# Patient Record
Sex: FEMALE | Race: WHITE | Employment: OTHER | URBAN - METROPOLITAN AREA
[De-identification: names, ages, dates, MRNs, and addresses within clinical notes are randomized per-mention and may not be internally consistent; named-entity substitution may affect disease eponyms.]

---

## 2017-04-04 PROBLEM — R73.9 HYPERGLYCEMIA: Status: ACTIVE | Noted: 2017-04-04

## 2017-12-22 ENCOUNTER — HOSPITAL ENCOUNTER (INPATIENT)
Age: 82
LOS: 4 days | Discharge: HOME HEALTH | DRG: 378 | End: 2017-12-26
Admitting: HOSPITALIST
Payer: MEDICARE

## 2017-12-22 ENCOUNTER — ANESTHESIA (OUTPATIENT)
Dept: ENDOSCOPY | Age: 82
DRG: 378 | End: 2017-12-22
Payer: MEDICARE

## 2017-12-22 ENCOUNTER — APPOINTMENT (OUTPATIENT)
Dept: GENERAL RADIOLOGY | Age: 82
DRG: 378 | End: 2017-12-22
Payer: MEDICARE

## 2017-12-22 ENCOUNTER — ANESTHESIA EVENT (OUTPATIENT)
Dept: ENDOSCOPY | Age: 82
DRG: 378 | End: 2017-12-22
Payer: MEDICARE

## 2017-12-22 DIAGNOSIS — K92.2 ACUTE GI BLEEDING: Primary | ICD-10-CM
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
## 2017-12-22 PROBLEM — K92.1 MELENA: Status: ACTIVE | Noted: 2017-12-22

## 2017-12-22 PROBLEM — D62 ACUTE BLOOD LOSS ANEMIA: Status: ACTIVE | Noted: 2017-12-22

## 2017-12-22 LAB
ALBUMIN SERPL-MCNC: 2.2 G/DL (ref 3.2–4.6)
ALBUMIN/GLOB SERPL: 0.7 {RATIO} (ref 1.2–3.5)
ALP SERPL-CCNC: 76 U/L (ref 50–136)
ALT SERPL-CCNC: 27 U/L (ref 12–65)
ANION GAP SERPL CALC-SCNC: 8 MMOL/L (ref 7–16)
AST SERPL-CCNC: 31 U/L (ref 15–37)
ATRIAL RATE: 99 BPM
BASOPHILS # BLD: 0 K/UL (ref 0–0.2)
BASOPHILS NFR BLD: 0 % (ref 0–2)
BILIRUB SERPL-MCNC: 0.2 MG/DL (ref 0.2–1.1)
BUN SERPL-MCNC: 30 MG/DL (ref 8–23)
CALCIUM SERPL-MCNC: 8.4 MG/DL (ref 8.3–10.4)
CALCULATED P AXIS, ECG09: 42 DEGREES
CALCULATED R AXIS, ECG10: -9 DEGREES
CALCULATED T AXIS, ECG11: -22 DEGREES
CHLORIDE SERPL-SCNC: 106 MMOL/L (ref 98–107)
CO2 SERPL-SCNC: 27 MMOL/L (ref 21–32)
CREAT SERPL-MCNC: 0.51 MG/DL (ref 0.6–1)
DIAGNOSIS, 93000: NORMAL
DIFFERENTIAL METHOD BLD: ABNORMAL
EOSINOPHIL # BLD: 0.3 K/UL (ref 0–0.8)
EOSINOPHIL NFR BLD: 2 % (ref 0.5–7.8)
ERYTHROCYTE [DISTWIDTH] IN BLOOD BY AUTOMATED COUNT: 13 % (ref 11.9–14.6)
GLOBULIN SER CALC-MCNC: 3 G/DL (ref 2.3–3.5)
GLUCOSE SERPL-MCNC: 208 MG/DL (ref 65–100)
HCT VFR BLD AUTO: 22.9 % (ref 35.8–46.3)
HCT VFR BLD AUTO: 26.3 % (ref 35.8–46.3)
HGB BLD-MCNC: 7.5 G/DL (ref 11.7–15.4)
HGB BLD-MCNC: 8.5 G/DL (ref 11.7–15.4)
IMM GRANULOCYTES # BLD: 0.1 K/UL (ref 0–0.5)
IMM GRANULOCYTES NFR BLD AUTO: 0 % (ref 0–5)
LIPASE SERPL-CCNC: 201 U/L (ref 73–393)
LYMPHOCYTES # BLD: 3.2 K/UL (ref 0.5–4.6)
LYMPHOCYTES NFR BLD: 20 % (ref 13–44)
MCH RBC QN AUTO: 31.4 PG (ref 26.1–32.9)
MCHC RBC AUTO-ENTMCNC: 32.8 G/DL (ref 31.4–35)
MCV RBC AUTO: 95.8 FL (ref 79.6–97.8)
MONOCYTES # BLD: 1.1 K/UL (ref 0.1–1.3)
MONOCYTES NFR BLD: 7 % (ref 4–12)
NEUTS SEG # BLD: 11 K/UL (ref 1.7–8.2)
NEUTS SEG NFR BLD: 71 % (ref 43–78)
P-R INTERVAL, ECG05: 144 MS
PLATELET # BLD AUTO: 321 K/UL (ref 150–450)
PMV BLD AUTO: 10.1 FL (ref 10.8–14.1)
POTASSIUM SERPL-SCNC: 3.9 MMOL/L (ref 3.5–5.1)
PROT SERPL-MCNC: 5.2 G/DL (ref 6.3–8.2)
Q-T INTERVAL, ECG07: 354 MS
QRS DURATION, ECG06: 76 MS
QTC CALCULATION (BEZET), ECG08: 454 MS
RBC # BLD AUTO: 2.39 M/UL (ref 4.05–5.25)
SODIUM SERPL-SCNC: 141 MMOL/L (ref 136–145)
VENTRICULAR RATE, ECG03: 99 BPM
WBC # BLD AUTO: 15.5 K/UL (ref 4.3–11.1)

## 2017-12-22 PROCEDURE — 74011250637 HC RX REV CODE- 250/637: Performed by: HOSPITALIST

## 2017-12-22 PROCEDURE — 76040000025: Performed by: INTERNAL MEDICINE

## 2017-12-22 PROCEDURE — 77030013131 HC IV BLD ST ICUM -A

## 2017-12-22 PROCEDURE — 86900 BLOOD TYPING SEROLOGIC ABO: CPT

## 2017-12-22 PROCEDURE — 74011000250 HC RX REV CODE- 250: Performed by: HOSPITALIST

## 2017-12-22 PROCEDURE — 77030032490 HC SLV COMPR SCD KNE COVD -B

## 2017-12-22 PROCEDURE — 80053 COMPREHEN METABOLIC PANEL: CPT

## 2017-12-22 PROCEDURE — 74011000250 HC RX REV CODE- 250: Performed by: INTERNAL MEDICINE

## 2017-12-22 PROCEDURE — 93005 ELECTROCARDIOGRAM TRACING: CPT

## 2017-12-22 PROCEDURE — 86920 COMPATIBILITY TEST SPIN: CPT

## 2017-12-22 PROCEDURE — 86923 COMPATIBILITY TEST ELECTRIC: CPT

## 2017-12-22 PROCEDURE — 87045 FECES CULTURE AEROBIC BACT: CPT

## 2017-12-22 PROCEDURE — C9113 INJ PANTOPRAZOLE SODIUM, VIA: HCPCS | Performed by: HOSPITALIST

## 2017-12-22 PROCEDURE — 71010 XR CHEST PORT: CPT

## 2017-12-22 PROCEDURE — C9113 INJ PANTOPRAZOLE SODIUM, VIA: HCPCS | Performed by: INTERNAL MEDICINE

## 2017-12-22 PROCEDURE — 83690 ASSAY OF LIPASE: CPT

## 2017-12-22 PROCEDURE — P9016 RBC LEUKOCYTES REDUCED: HCPCS

## 2017-12-22 PROCEDURE — 76060000031 HC ANESTHESIA FIRST 0.5 HR: Performed by: INTERNAL MEDICINE

## 2017-12-22 PROCEDURE — 36415 COLL VENOUS BLD VENIPUNCTURE: CPT | Performed by: INTERNAL MEDICINE

## 2017-12-22 PROCEDURE — 94760 N-INVAS EAR/PLS OXIMETRY 1: CPT

## 2017-12-22 PROCEDURE — 74011250636 HC RX REV CODE- 250/636: Performed by: ANESTHESIOLOGY

## 2017-12-22 PROCEDURE — 74011250636 HC RX REV CODE- 250/636

## 2017-12-22 PROCEDURE — 85018 HEMOGLOBIN: CPT | Performed by: INTERNAL MEDICINE

## 2017-12-22 PROCEDURE — 74011250636 HC RX REV CODE- 250/636: Performed by: INTERNAL MEDICINE

## 2017-12-22 PROCEDURE — 74011250636 HC RX REV CODE- 250/636: Performed by: HOSPITALIST

## 2017-12-22 PROCEDURE — 85025 COMPLETE CBC W/AUTO DIFF WBC: CPT

## 2017-12-22 PROCEDURE — 99285 EMERGENCY DEPT VISIT HI MDM: CPT

## 2017-12-22 PROCEDURE — 65270000029 HC RM PRIVATE

## 2017-12-22 PROCEDURE — 36430 TRANSFUSION BLD/BLD COMPNT: CPT | Performed by: NURSE PRACTITIONER

## 2017-12-22 PROCEDURE — 77010033678 HC OXYGEN DAILY

## 2017-12-22 PROCEDURE — 0DC68ZZ EXTIRPATION OF MATTER FROM STOMACH, VIA NATURAL OR ARTIFICIAL OPENING ENDOSCOPIC: ICD-10-PCS | Performed by: INTERNAL MEDICINE

## 2017-12-22 PROCEDURE — 30233N1 TRANSFUSION OF NONAUTOLOGOUS RED BLOOD CELLS INTO PERIPHERAL VEIN, PERCUTANEOUS APPROACH: ICD-10-PCS

## 2017-12-22 RX ORDER — ACETAMINOPHEN 325 MG/1
650 TABLET ORAL
Status: DISCONTINUED | OUTPATIENT
Start: 2017-12-22 | End: 2017-12-26 | Stop reason: HOSPADM

## 2017-12-22 RX ORDER — SODIUM CHLORIDE 0.9 % (FLUSH) 0.9 %
5-10 SYRINGE (ML) INJECTION AS NEEDED
Status: DISCONTINUED | OUTPATIENT
Start: 2017-12-22 | End: 2017-12-26 | Stop reason: HOSPADM

## 2017-12-22 RX ORDER — SODIUM CHLORIDE, SODIUM LACTATE, POTASSIUM CHLORIDE, CALCIUM CHLORIDE 600; 310; 30; 20 MG/100ML; MG/100ML; MG/100ML; MG/100ML
100 INJECTION, SOLUTION INTRAVENOUS CONTINUOUS
Status: DISCONTINUED | OUTPATIENT
Start: 2017-12-22 | End: 2017-12-23

## 2017-12-22 RX ORDER — PROPOFOL 10 MG/ML
INJECTION, EMULSION INTRAVENOUS AS NEEDED
Status: DISCONTINUED | OUTPATIENT
Start: 2017-12-22 | End: 2017-12-22 | Stop reason: HOSPADM

## 2017-12-22 RX ORDER — PAROXETINE HYDROCHLORIDE 20 MG/1
20 TABLET, FILM COATED ORAL DAILY
Status: DISCONTINUED | OUTPATIENT
Start: 2017-12-22 | End: 2017-12-26 | Stop reason: HOSPADM

## 2017-12-22 RX ORDER — ANASTROZOLE 1 MG/1
1 TABLET ORAL DAILY
Status: DISCONTINUED | OUTPATIENT
Start: 2017-12-22 | End: 2017-12-26 | Stop reason: HOSPADM

## 2017-12-22 RX ORDER — ONDANSETRON 2 MG/ML
4 INJECTION INTRAMUSCULAR; INTRAVENOUS ONCE
Status: COMPLETED | OUTPATIENT
Start: 2017-12-22 | End: 2017-12-22

## 2017-12-22 RX ORDER — ONDANSETRON 2 MG/ML
INJECTION INTRAMUSCULAR; INTRAVENOUS
Status: ACTIVE
Start: 2017-12-22 | End: 2017-12-22

## 2017-12-22 RX ORDER — SODIUM CHLORIDE 0.9 % (FLUSH) 0.9 %
5-10 SYRINGE (ML) INJECTION EVERY 8 HOURS
Status: DISCONTINUED | OUTPATIENT
Start: 2017-12-22 | End: 2017-12-26 | Stop reason: HOSPADM

## 2017-12-22 RX ORDER — SODIUM CHLORIDE 9 MG/ML
250 INJECTION, SOLUTION INTRAVENOUS AS NEEDED
Status: DISCONTINUED | OUTPATIENT
Start: 2017-12-22 | End: 2017-12-22

## 2017-12-22 RX ORDER — PROPOFOL 10 MG/ML
INJECTION, EMULSION INTRAVENOUS
Status: DISCONTINUED | OUTPATIENT
Start: 2017-12-22 | End: 2017-12-22 | Stop reason: HOSPADM

## 2017-12-22 RX ORDER — AMLODIPINE BESYLATE 5 MG/1
2.5 TABLET ORAL DAILY
Status: DISCONTINUED | OUTPATIENT
Start: 2017-12-22 | End: 2017-12-26 | Stop reason: HOSPADM

## 2017-12-22 RX ORDER — ONDANSETRON 2 MG/ML
4 INJECTION INTRAMUSCULAR; INTRAVENOUS
Status: DISCONTINUED | OUTPATIENT
Start: 2017-12-22 | End: 2017-12-26 | Stop reason: HOSPADM

## 2017-12-22 RX ORDER — ATORVASTATIN CALCIUM 40 MG/1
40 TABLET, FILM COATED ORAL DAILY
Status: DISCONTINUED | OUTPATIENT
Start: 2017-12-22 | End: 2017-12-26 | Stop reason: HOSPADM

## 2017-12-22 RX ORDER — FAMOTIDINE 20 MG/1
20 TABLET, FILM COATED ORAL AS NEEDED
Status: DISCONTINUED | OUTPATIENT
Start: 2017-12-22 | End: 2017-12-26 | Stop reason: HOSPADM

## 2017-12-22 RX ADMIN — Medication 10 ML: at 17:18

## 2017-12-22 RX ADMIN — SODIUM CHLORIDE, SODIUM LACTATE, POTASSIUM CHLORIDE, AND CALCIUM CHLORIDE: 600; 310; 30; 20 INJECTION, SOLUTION INTRAVENOUS at 15:22

## 2017-12-22 RX ADMIN — SODIUM CHLORIDE, SODIUM LACTATE, POTASSIUM CHLORIDE, AND CALCIUM CHLORIDE 100 ML/HR: 600; 310; 30; 20 INJECTION, SOLUTION INTRAVENOUS at 13:56

## 2017-12-22 RX ADMIN — AMLODIPINE BESYLATE 2.5 MG: 5 TABLET ORAL at 14:56

## 2017-12-22 RX ADMIN — SODIUM CHLORIDE 40 MG: 9 INJECTION INTRAMUSCULAR; INTRAVENOUS; SUBCUTANEOUS at 10:27

## 2017-12-22 RX ADMIN — OCTREOTIDE ACETATE 50 MCG/HR: 500 INJECTION, SOLUTION INTRAVENOUS; SUBCUTANEOUS at 18:22

## 2017-12-22 RX ADMIN — PAROXETINE HYDROCHLORIDE 20 MG: 20 TABLET, FILM COATED ORAL at 10:26

## 2017-12-22 RX ADMIN — CHLORASEPTIC 1 SPRAY: 1.5 LIQUID ORAL at 17:17

## 2017-12-22 RX ADMIN — SODIUM CHLORIDE 80 MG: 9 INJECTION, SOLUTION INTRAMUSCULAR; INTRAVENOUS; SUBCUTANEOUS at 06:06

## 2017-12-22 RX ADMIN — ONDANSETRON 4 MG: 2 INJECTION INTRAMUSCULAR; INTRAVENOUS at 06:06

## 2017-12-22 RX ADMIN — PROPOFOL 60 MG: 10 INJECTION, EMULSION INTRAVENOUS at 15:26

## 2017-12-22 RX ADMIN — ANASTROZOLE 1 MG: 1 TABLET, COATED ORAL at 10:24

## 2017-12-22 RX ADMIN — Medication 10 ML: at 21:08

## 2017-12-22 RX ADMIN — ATORVASTATIN CALCIUM 40 MG: 40 TABLET, FILM COATED ORAL at 10:26

## 2017-12-22 RX ADMIN — SODIUM CHLORIDE 40 MG: 9 INJECTION INTRAMUSCULAR; INTRAVENOUS; SUBCUTANEOUS at 17:17

## 2017-12-22 RX ADMIN — PROPOFOL 140 MCG/KG/MIN: 10 INJECTION, EMULSION INTRAVENOUS at 15:27

## 2017-12-22 NOTE — CONSULTS
[facility] Consult Note    Dylan Farah [place] 1935       Primary GI Physician: new    Referring Physician:  Dr Naveen Whitlock    Consult Date:  12/22/2017    Admit Date:  12/22/2017    Chief Complaint:  Melena    Subjective:     History of Present Illness:  Patient is a 80 y.o. female seen in consultation at the request of Dr. Naveen Whitlock for evaluation of anemia and melena. She was admitted in the night after having been found in a pool of black tarry stool. WBC was elevated at 15.5, and hgb was noted at 7.5. BUN was also elevated at 50. She was placed on Protonix IV bid and is to be transfused one unit PRBC. She reports some lightheadedness and clamminess for the last week along with some abdominal bloating in the last month. She is on daily aspirin at home but denies use of nsaids. HX of breast cancer is noted, S/P lumpectomy, but no radiation. She has had no prior EGD and cannot recall last colonoscopy. There are no records listed in the [facility] office for her. Portable chest xray  12/22/17    COMPARISON: January 17, 2014  INDICATION: GI bleed. Fall    FINDINGS:     Lungs are underinflated. Mild bibasilar densities, likely atelectasis. No focal  consolidation, pneumothorax or pulmonary edema. No large pleural effusion. Cardiac mediastinal contour is within normal limits.     There are degenerative changes at the right glenohumeral joint. A 12 mm  calcification inferior to the shoulder joint, likely an intra-articular loose  body. IMPRESSION:    No evidence of acute pulmonary disease. Lorrie Valladares       PMH:  Past Medical History:   Diagnosis Date    Acute bronchitis     Anxiety state, unspecified     Cancer ([address] 12/13    left breast    Chest pain, unspecified     Depression     Depressive disorder, not elsewhere classified 5/13/2015    Disorder of bone and cartilage, unspecified 5/13/2015    Enthesopathy of unspecified site 5/13/2015    Essential hypertension, benign 5/13/2015    GERD (gastroesophageal reflux disease)     no longer has    Hip, thigh, leg, and ankle, blister, without mention of infection     Hypercholesterolemia     Hypertension     controlled with medication    Impaired fasting glucose 5/13/2015    Loss of height     Malignant neoplasm of breast (female), unspecified site     Mixed incontinence urge and stress (male)(female)     Other and unspecified hyperlipidemia 5/13/2015    Other unspecified back disorder     Overweight(278.02)     Psychiatric disorder     depression    Unspecified asthma(493.90)     Unspecified hemorrhoids without mention of complication 6/32/0272    Unspecified menopausal and postmenopausal disorder        PSH:  Past Surgical History:   Procedure Laterality Date    ABDOMEN SURGERY PROC UNLISTED      esophagus to correct GERD unsure of procedure name    HX BREAST LUMPECTOMY  1/24/2014    LEFT BREAST NEEDLE LOCALIZED MASS EXCISION     performed by Rishi Gaytan MD at [address] [facility] HX HYSTERECTOMY  1970s    HX LAP CHOLECYSTECTOMY  2001       Allergies:  No Known Allergies    Home Medications:  Prior to Admission medications    Medication Sig Start Date End Date Taking? Authorizing Provider   amoxicillin-clavulanate (AUGMENTIN) 500-125 mg per tablet Take 1 Tab by mouth two (2) times a day. 12/6/17   Landon Thompson MD   atorvastatin (LIPITOR) 40 mg tablet Take 1 Tab by mouth daily. 11/21/17   Landon Thompson MD   hydroCHLOROthiazide (HYDRODIURIL) 25 mg tablet Take 1 Tab by mouth daily. 9/8/17   Landon Thompson MD   traMADol (ULTRAM) 50 mg tablet Take 1 Tab by mouth every six (6) hours as needed for Pain. Max Daily Amount: 200 mg. 8/4/17   Landon Thompson MD   anastrozole (ARIMIDEX) 1 mg tablet Take 1 Tab by mouth daily.  8/4/17   Landon Thompson MD   PARoxetine (PAXIL) 20 mg tablet TAKE 1 TABLET EVERY DAY 4/4/17   Landon Thompson MD   amLODIPine (NORVASC) 5 mg tablet TAKE 1 TABLET EVERY DAY 4/4/17   Landon Thompson MD   potassium chloride (K-DUR, KLOR-CON) 20 mEq tablet Take 1 Tab by mouth two (2) times a day. Patient taking differently: Take 20 mEq by mouth daily. 12/30/16   Danna Pollock MD   aspirin delayed-release 81 mg tablet Take 81 mg by mouth daily. Historical Provider   loratadine (CLARITIN) 10 mg tablet Take 10 mg by mouth daily. Historical Provider   cholecalciferol (VITAMIN D3) 1,000 unit tablet Take  by mouth daily. Historical Provider   FOLIC ACID/MULTIVIT-MIN/LUTEIN (CENTRUM SILVER PO) Take 1 Tab by mouth daily. Historical Provider   calcium 500 mg tab Take 1,200 mg by mouth daily. Historical Provider       Hospital Medications:  Current Facility-Administered Medications   Medication Dose Route Frequency    anastrozole (ARIMIDEX) tablet 1 mg  1 mg Oral DAILY    atorvastatin (LIPITOR) tablet 40 mg  40 mg Oral DAILY    PARoxetine (PAXIL) tablet 20 mg  20 mg Oral DAILY    amLODIPine (NORVASC) tablet 2.5 mg  2.5 mg Oral DAILY    sodium chloride (NS) flush 5-10 mL  5-10 mL IntraVENous Q8H    sodium chloride (NS) flush 5-10 mL  5-10 mL IntraVENous PRN    ondansetron (ZOFRAN) injection 4 mg  4 mg IntraVENous Q4H PRN    pantoprazole (PROTONIX) 40 mg in sodium chloride 0.9 % 10 mL injection  40 mg IntraVENous BID    octreotide (SANDOSTATIN) 500 mcg in 0.9% sodium chloride 500 mL infusion  50 mcg/hr IntraVENous ONCE    phenol throat spray (CHLORASEPTIC) 1 Spray  1 Spray Oral PRN    acetaminophen (TYLENOL) tablet 650 mg  650 mg Oral Q6H PRN    tuberculin injection 5 Units  5 Units IntraDERMal ONCE       Social History:  Social History   Substance Use Topics    Smoking status: Never Smoker    Smokeless tobacco: Never Used    Alcohol use Yes      Comment: rarely       Pt denies any history of drug use, blood transfusions, or tattoos.     Family History:  Family History   Problem Relation Age of Onset    Cancer Maternal Grandmother      breast at old age   [employer] Lung Disease Mother     Cancer Father    [employer] Diabetes Brother     Lung Disease Brother     Alcohol abuse Brother        Review of Systems:  A detailed 10 system ROS is obtained, with pertinent positives as listed above. All others are negative. Diet:      Objective:     Physical Exam:  Vitals:  Visit Vitals    /50    Pulse 98    Temp 98.5 °F (36.9 °C)    Resp 16    SpO2 95%     Gen:  Pt is alert, cooperative, no acute distress  Skin:  Extremities and face reveal no rashes. Pale. HEENT: Sclerae anicteric. Extra-occular muscles are intact. No oral ulcers. No abnormal pigmentation of the lips. The neck is supple. Cardiovascular: Regular rate and rhythm. No murmurs, gallops, or rubs. Respiratory:  Comfortable breathing with no accessory muscle use. Clear breath sounds anteriorly with no wheezes, rales, or rhonchi. GI:  Abdomen nondistended, soft, and nontender. Normal active bowel sounds. No enlargement of the liver or spleen. No masses palpable. Rectal:  Deferred  Musculoskeletal:  No pitting edema of the lower legs. Neurological:  Gross memory appears intact. Patient is alert and oriented. Psychiatric:  Mood appears appropriate with judgement intact. Lymphatic:  No cervical or supraclavicular adenopathy. Laboratory:    Recent Labs      12/22/17   0415   WBC  15.5*   HGB  7.5*   HCT  22.9*   PLT  321   MCV  95.8   NA  141   K  3.9   CL  106   CO2  27   BUN  30*   CREA  0.51*   CA  8.4   GLU  208*   AP  76   SGOT  31   ALT  27   TBILI  0.2   ALB  2.2*   TP  5.2*   LPSE  201          Assessment:     Active Problems:    Melena (12/22/2017)      Upper GI bleed (12/22/2017)      Fall (12/22/2017)        Plan:     79 yo female is seen in consultation for evaluation of anemia and melena. She reports some mild abd bloating in the last month but had not noted changes in bowel pattern until yesterday, with the onset of mild nausea and melena.   Hgb was 7.5 on arrival to the ER, and she has been placed on bid Protonix IV, with 1 unit PRBC to be given. She denies prior hx of ulcer disease and takes only a daily asa at home, avoiding nsaids. Hx of breast cancer is noted, S/P lumpectomy, but she did not have radiation. She notes some recent light headedness as well as clammy sweats. Suspect ugi bleed. 1.  NPO  2. Follow hgb, transfuse to keep hgb >8  3. Agree with Protonix IV bid  4. Plan EGD in evaluation. Risks reviewed with patient and spouse. Discuss timing with Dr Fely Montenegro. Patient is seen and examined in collaboration with Dr. Fely Montenegro. Assessment and plan as per Dr. Fely Montenegro.   Guy Guillermo NP

## 2017-12-22 NOTE — ANESTHESIA POSTPROCEDURE EVALUATION
Post-Anesthesia Evaluation and Assessment    Patient: Julia Paulson MRN: [id number]  SSN: [id number]    YOB: 1935  Age: 80 y.o. Sex: female       Cardiovascular Function/Vital Signs  Visit Vitals    /61    Pulse 88    Temp 36.6 °C (97.8 °F)    Resp 16    SpO2 100%       Patient is status post total IV anesthesia anesthesia for Procedure(s):  ESOPHAGOGASTRODUODENOSCOPY (EGD)/625. Nausea/Vomiting: None    Postoperative hydration reviewed and adequate. Pain:  Pain Scale 1: Numeric (0 - 10) (12/22/17 1620)  Pain Intensity 1: 0 (12/22/17 1620)   Managed    Neurological Status: At baseline    Mental Status and Level of Consciousness: Arousable    Pulmonary Status:   O2 Device: Nasal cannula (12/22/17 1620)   Adequate oxygenation and airway patent    Complications related to anesthesia: None    Post-anesthesia assessment completed.  No concerns    Signed By: Carlos Frost MD     December 22, 2017

## 2017-12-22 NOTE — ROUTINE PROCESS
TRANSFER - OUT REPORT:    Verbal report given to Marina Gilliam RN(name) on Ng Form  being transferred to [facility](unit) for routine progression of care       Report consisted of patients Situation, Background, Assessment and   Recommendations(SBAR). Information from the following report(s) Procedure Summary was reviewed with the receiving nurse. Lines:   Peripheral IV 12/22/17 Right Forearm (Active)   Site Assessment Clean, dry, & intact 12/22/2017  4:23 AM       Peripheral IV 12/22/17 Left Forearm (Active)   Site Assessment Clean, dry, & intact 12/22/2017  1:55 PM   Phlebitis Assessment 0 12/22/2017  1:55 PM   Dressing Status Clean, dry, & intact 12/22/2017  1:55 PM   Dressing Type Tape;Transparent 12/22/2017  1:55 PM   Hub Color/Line Status Pink; Infusing 12/22/2017  1:55 PM        Opportunity for questions and clarification was provided.       Patient transported with:   [employer]

## 2017-12-22 NOTE — PROGRESS NOTES
TRANSFER - IN REPORT:    Verbal report received from Olga Lidia De Los Santos RN on George Zamarripa  being received from GI [facility] for routine progression of care      Report consisted of patients Situation, Background, Assessment and   Recommendations(SBAR). Information from the following report(s) SBAR was reviewed with the receiving nurse. Opportunity for questions and clarification was provided. Assessment completed upon patients arrival to unit and care assumed.

## 2017-12-22 NOTE — PROGRESS NOTES
TRANSFER - IN REPORT:    Verbal report received from OSWALD Tavares on Adonis Lutz  being received from ED for routine progression of care      Report consisted of patients Situation, Background, Assessment and   Recommendations(SBAR). Information from the following report(s) SBAR was reviewed with the receiving nurse. Opportunity for questions and clarification was provided. Assessment completed upon patients arrival to unit and care assumed.

## 2017-12-22 NOTE — PROGRESS NOTES
Pt seen and examined  Admitted after midnight    GI Bleed, Melena, Fall and ABLA  Getting 1u PRBC and trend H&H  GI conslted  IV protonix. NPO, IVF.      NO bill charged to pt for the encounter

## 2017-12-22 NOTE — ANESTHESIA PREPROCEDURE EVALUATION
Anesthetic History   No history of anesthetic complications            Review of Systems / Medical History  Patient summary reviewed, nursing notes reviewed and pertinent labs reviewed    Pulmonary  Within defined limits                 Neuro/Psych       CVA: no residual symptoms       Cardiovascular    Hypertension: well controlled              Exercise tolerance: <4 METS     GI/Hepatic/Renal     GERD: well controlled           Endo/Other        Obesity and cancer (breast, on arimidex, s/p lumpectomy.)     Other Findings                   Anesthetic Plan    ASA: 3  Anesthesia type: total IV anesthesia          Induction: Intravenous  Anesthetic plan and risks discussed with: Patient

## 2017-12-22 NOTE — H&P
HOSPITALIST H&P/CONSULT  NAME:  Froylan Eli   Age:  80 y.o.  :   1935   MRN:   [id number]  PCP: Shayy Ramriez MD  Consulting MD:  Treatment Team: Attending Provider: Anne Fenton MD; Primary Nurse: Nawaf Park  HPI:   Patient is 80years old female brought in to ER via EMS after she signaled her life alert at home following a mechanical fall. Pt was found in a pool of black tarry stool. Pt reported no head trauma, LOC, seizure like activity. Pt reports feeling lethargic and exhausted for past couple of weeks, didn't notice any dark/tarry stool until today. She denies any chest pain, abdominal pain, diarrhea, excessive NSAID's use, lightheadedness/dizziness, fever, chills, night sweats. She does complain of dry cough and sore throat for last few days. In ER, she had a small BM which was black/tarry, also noted to have dry heaves. Hb 7.5, WBC 15k (no leftward shift). Pmhx significant for HTN, dyslipidemia, breast cancer, GERD, obesity, depression.       Complete ROS done and is as stated in HPI or otherwise negative  Past Medical History:   Diagnosis Date    Acute bronchitis     Anxiety state, unspecified     Cancer ([facility] [address]     left breast    Chest pain, unspecified     Depression     Depressive disorder, not elsewhere classified 2015    Disorder of bone and cartilage, unspecified 2015    Enthesopathy of unspecified site 2015    Essential hypertension, benign 2015    GERD (gastroesophageal reflux disease)     no longer has    Hip, thigh, leg, and ankle, blister, without mention of infection     Hypercholesterolemia     Hypertension     controlled with medication    Impaired fasting glucose 2015    Loss of height     Malignant neoplasm of breast (female), unspecified site     Mixed incontinence urge and stress (male)(female)     Other and unspecified hyperlipidemia 2015    Other unspecified back disorder     Overweight(278.02)     Psychiatric disorder     depression    Unspecified asthma(493.90)     Unspecified hemorrhoids without mention of complication 5/13/2652    Unspecified menopausal and postmenopausal disorder       Past Surgical History:   Procedure Laterality Date    ABDOMEN SURGERY PROC UNLISTED      esophagus to correct GERD unsure of procedure name    HX BREAST LUMPECTOMY  1/24/2014    LEFT BREAST NEEDLE LOCALIZED MASS EXCISION     performed by Rishi Gaytan MD at [address] HX HYSTERECTOMY  1970s    HX LAP CHOLECYSTECTOMY  2001      Prior to Admission Medications   Prescriptions Last Dose Informant Patient Reported? Taking? FOLIC ACID/MULTIVIT-MIN/LUTEIN (CENTRUM SILVER PO)   Yes No   Sig: Take 1 Tab by mouth daily. PARoxetine (PAXIL) 20 mg tablet   No No   Sig: TAKE 1 TABLET EVERY DAY   amLODIPine (NORVASC) 5 mg tablet   No No   Sig: TAKE 1 TABLET EVERY DAY   amoxicillin-clavulanate (AUGMENTIN) 500-125 mg per tablet   No No   Sig: Take 1 Tab by mouth two (2) times a day. anastrozole (ARIMIDEX) 1 mg tablet   No No   Sig: Take 1 Tab by mouth daily. aspirin delayed-release 81 mg tablet   Yes No   Sig: Take 81 mg by mouth daily. atorvastatin (LIPITOR) 40 mg tablet   No No   Sig: Take 1 Tab by mouth daily. calcium 500 mg tab   Yes No   Sig: Take 1,200 mg by mouth daily. cholecalciferol (VITAMIN D3) 1,000 unit tablet   Yes No   Sig: Take  by mouth daily. hydroCHLOROthiazide (HYDRODIURIL) 25 mg tablet   No No   Sig: Take 1 Tab by mouth daily. loratadine (CLARITIN) 10 mg tablet   Yes No   Sig: Take 10 mg by mouth daily. potassium chloride (K-DUR, KLOR-CON) 20 mEq tablet   No No   Sig: Take 1 Tab by mouth two (2) times a day. Patient taking differently: Take 20 mEq by mouth daily. traMADol (ULTRAM) 50 mg tablet   No No   Sig: Take 1 Tab by mouth every six (6) hours as needed for Pain. Max Daily Amount: 200 mg.       Facility-Administered Medications: None     No Known Allergies   Social History   Substance Use Topics    Smoking status: Never Smoker    Smokeless tobacco: Never Used    Alcohol use Yes      Comment: rarely      Family History   Problem Relation Age of Onset    Cancer Maternal Grandmother      breast at old age   [facility] Lung Disease Mother     Cancer Father     Diabetes Brother     Lung Disease Brother     Alcohol abuse Brother       Objective:     Visit Vitals    /56    Pulse 83    Resp 18    SpO2 93%      No data recorded. Oxygen Therapy  O2 Sat (%): 93 % (12/22/17 0533)  Pulse via Oximetry: 83 beats per minute (12/22/17 0533)  Physical Exam:  General:    Alert, awake, mild distress due to dry heaving, obese    Head:   Normocephalic, without obvious abnormality, atraumatic. Nose:  Nares normal. No drainage or sinus tenderness. Lungs:   Clear to auscultation bilaterally. No Wheezing or Rhonchi. No rales. Heart:   Regular rate and rhythm,  no murmur, rub or gallop. Abdomen:   Soft, obese, non-tender. Not distended. Bowel sounds normal.   Extremities: No cyanosis. No edema. No clubbing  Skin:     Texture, turgor normal. No rashes or lesions.   Not Jaundiced  Neurologic: GCS 15, no motor or sensory deficits, CN 2-12 intact  Psych:             AOx 3, mood and affect appropriate  Data Review:   Recent Results (from the past 24 hour(s))   TYPE & SCREEN    Collection Time: 12/22/17  4:13 AM   Result Value Ref Range    Crossmatch Expiration 12/25/2017     ABO/Rh(D) Alcira Stai POSITIVE     Antibody screen NEG    CBC WITH AUTOMATED DIFF    Collection Time: 12/22/17  4:15 AM   Result Value Ref Range    WBC 15.5 (H) 4.3 - 11.1 K/uL    RBC 2.39 (L) 4.05 - 5.25 M/uL    HGB 7.5 (L) 11.7 - 15.4 g/dL    HCT 22.9 (L) 35.8 - 46.3 %    MCV 95.8 79.6 - 97.8 FL    MCH 31.4 26.1 - 32.9 PG    MCHC 32.8 31.4 - 35.0 g/dL    RDW 13.0 11.9 - 14.6 %    PLATELET 405 681 - 230 K/uL    MPV 10.1 (L) 10.8 - 14.1 FL    DF AUTOMATED      NEUTROPHILS 71 43 - 78 %    LYMPHOCYTES 20 13 - 44 %    MONOCYTES 7 4.0 - 12.0 % EOSINOPHILS 2 0.5 - 7.8 %    BASOPHILS 0 0.0 - 2.0 %    IMMATURE GRANULOCYTES 0 0.0 - 5.0 %    ABS. NEUTROPHILS 11.0 (H) 1.7 - 8.2 K/UL    ABS. LYMPHOCYTES 3.2 0.5 - 4.6 K/UL    ABS. MONOCYTES 1.1 0.1 - 1.3 K/UL    ABS. EOSINOPHILS 0.3 0.0 - 0.8 K/UL    ABS. BASOPHILS 0.0 0.0 - 0.2 K/UL    ABS. IMM. GRANS. 0.1 0.0 - 0.5 K/UL   METABOLIC PANEL, COMPREHENSIVE    Collection Time: 12/22/17  4:15 AM   Result Value Ref Range    Sodium 141 136 - 145 mmol/L    Potassium 3.9 3.5 - 5.1 mmol/L    Chloride 106 98 - 107 mmol/L    CO2 27 21 - 32 mmol/L    Anion gap 8 7 - 16 mmol/L    Glucose 208 (H) 65 - 100 mg/dL    BUN 30 (H) 8 - 23 MG/DL    Creatinine 0.51 (L) 0.6 - 1.0 MG/DL    GFR est AA >60 >60 ml/min/1.73m2    GFR est non-AA >60 >60 ml/min/1.73m2    Calcium 8.4 8.3 - 10.4 MG/DL    Bilirubin, total 0.2 0.2 - 1.1 MG/DL    ALT (SGPT) 27 12 - 65 U/L    AST (SGOT) 31 15 - 37 U/L    Alk. phosphatase 76 50 - 136 U/L    Protein, total 5.2 (L) 6.3 - 8.2 g/dL    Albumin 2.2 (L) 3.2 - 4.6 g/dL    Globulin 3.0 2.3 - 3.5 g/dL    A-G Ratio 0.7 (L) 1.2 - 3.5     LIPASE    Collection Time: 12/22/17  4:15 AM   Result Value Ref Range    Lipase 201 73 - 393 U/L     Imaging /Procedures /Studies   Portable chest xray       COMPARISON: January 17, 2014     INDICATION: GI bleed. Fall     FINDINGS:      Lungs are underinflated. Mild bibasilar densities, likely atelectasis. No focal  consolidation, pneumothorax or pulmonary edema. No large pleural effusion. Cardiac mediastinal contour is within normal limits.      There are degenerative changes at the right glenohumeral joint. A 12 mm  calcification inferior to the shoulder joint, likely an intra-articular loose  body.     IMPRESSION  IMPRESSION:     No evidence of acute pulmonary disease. .  Assessment and Plan:      Active Hospital Problems    Diagnosis Date Noted    Melena 12/22/2017    Upper GI bleed 12/22/2017    Fall 12/22/2017       PLAN  · Admit to inpatient in view of upper GI bleed  · Type and cross, transfuse 1 unit now. · Monitor H/H q8h  · Start IV protonix 40 mg q12h with octreotide infusion. · GI consulted for possible endoscopic intervention  · NPO except for meds  · PRN zofran for nausea/vomiting  · PRN tylenol for mild pain and fever  · F/u with rapid flu test, pt complains of sore throat and cough and has leukocytosis. · IV fluids @ 100 cc/hr  · DVT prophylaxis with SCD  · Continue home meds as reconciled in MAR. · Holding HCTZ, continue norvasc at a lower dose, 2.5 mg  · Remote tele monitoring  · PPD  · PT/OT eval and treat    Code Status: DNR (no chest compression or intubation.  Okay with BiPAP/CPAP and vasopressors)    Anticipated discharge: >2 MN    Signed By: Alexandro Rothman MD     December 22, 2017

## 2017-12-22 NOTE — ED PROVIDER NOTES
HPI Comments: 57-year-old female who lives alone and was found in her bathroom floor in a pool of black tarry stool. She had fallen in the bedroom crawled to the bathroom and signaled her life alert necklace to get help. Patient is a 80 y.o. female presenting with fall. The history is provided by the patient. Fall   The fall occurred while standing. She landed on carpet. The pain is at a severity of 0/10. The patient is experiencing no pain. Associated symptoms include abdominal pain. Pertinent negatives include no visual change, no loss of consciousness and no laceration. The risk factors include being elderly. The symptoms are aggravated by standing.         Past Medical History:   Diagnosis Date    Acute bronchitis     Anxiety state, unspecified     Cancer ([facility] [address] 12/13    left breast    Chest pain, unspecified     Depression     Depressive disorder, not elsewhere classified 5/13/2015    Disorder of bone and cartilage, unspecified 5/13/2015    Enthesopathy of unspecified site 5/13/2015    Essential hypertension, benign 5/13/2015    GERD (gastroesophageal reflux disease)     no longer has    Hip, thigh, leg, and ankle, blister, without mention of infection     Hypercholesterolemia     Hypertension     controlled with medication    Impaired fasting glucose 5/13/2015    Loss of height     Malignant neoplasm of breast (female), unspecified site     Mixed incontinence urge and stress (male)(female)     Other and unspecified hyperlipidemia 5/13/2015    Other unspecified back disorder     Overweight(278.02)     Psychiatric disorder     depression    Unspecified asthma(493.90)     Unspecified hemorrhoids without mention of complication 9/72/5956    Unspecified menopausal and postmenopausal disorder        Past Surgical History:   Procedure Laterality Date    ABDOMEN SURGERY PROC UNLISTED      esophagus to correct GERD unsure of procedure name    HX BREAST LUMPECTOMY  1/24/2014    LEFT BREAST NEEDLE LOCALIZED MASS EXCISION     performed by Maddi Herbert MD at [address] HX HYSTERECTOMY  1970s    HX LAP CHOLECYSTECTOMY  2001         Family History:   Problem Relation Age of Onset    Cancer Maternal Grandmother      breast at old age   [facility] Lung Disease Mother     Cancer Father     Diabetes Brother     Lung Disease Brother     Alcohol abuse Brother        Social History     Social History    Marital status:      Spouse name: N/A    Number of children: N/A    Years of education: N/A     Occupational History    Not on file. Social History Main Topics    Smoking status: Never Smoker    Smokeless tobacco: Never Used    Alcohol use Yes      Comment: rarely    Drug use: No    Sexual activity: Not on file     Other Topics Concern    Not on file     Social History Narrative         ALLERGIES: Review of patient's allergies indicates no known allergies. Review of Systems   Constitutional: Negative. Negative for activity change. HENT: Negative. Eyes: Negative. Respiratory: Negative. Cardiovascular: Negative. Gastrointestinal: Positive for abdominal pain. Genitourinary: Negative. Musculoskeletal: Negative. Skin: Negative. Neurological: Negative. Negative for loss of consciousness. Psychiatric/Behavioral: Negative. All other systems reviewed and are negative. There were no vitals filed for this visit. Physical Exam   Constitutional: She is oriented to person, place, and time. She appears well-developed and well-nourished. No distress. HENT:   Head: Normocephalic and atraumatic. Right Ear: External ear normal.   Left Ear: External ear normal.   Nose: Nose normal.   Mouth/Throat: Oropharynx is clear and moist. No oropharyngeal exudate. Eyes: Conjunctivae and EOM are normal. Pupils are equal, round, and reactive to light. Right eye exhibits no discharge. Left eye exhibits no discharge. No scleral icterus. Neck: Normal range of motion.  Neck supple. No JVD present. No tracheal deviation present. Cardiovascular: Normal rate, regular rhythm and intact distal pulses. Pulmonary/Chest: Effort normal and breath sounds normal. No stridor. No respiratory distress. She has no wheezes. She exhibits no tenderness. Abdominal: Soft. Bowel sounds are normal. She exhibits no distension and no mass. There is no tenderness. Musculoskeletal: Normal range of motion. She exhibits no edema or tenderness. Neurological: She is alert and oriented to person, place, and time. No cranial nerve deficit. Skin: Skin is warm and dry. No laceration and no rash noted. She is not diaphoretic. No erythema. There is pallor. Psychiatric: She has a normal mood and affect. Her behavior is normal. Thought content normal.   Nursing note and vitals reviewed. MDM  Number of Diagnoses or Management Options  Diagnosis management comments: Large volume of black tarry stool in the emergency department.        Amount and/or Complexity of Data Reviewed  Clinical lab tests: reviewed and ordered  Tests in the radiology section of CPT®: ordered and reviewed  Tests in the medicine section of CPT®: ordered and reviewed    Risk of Complications, Morbidity, and/or Mortality  Presenting problems: high  Diagnostic procedures: high  Management options: high      ED Course       Procedures

## 2017-12-22 NOTE — PROGRESS NOTES
TRANSFER - IN REPORT:    Verbal report received from [facility] on [address]  being received from [id number] for ordered procedure      Report consisted of patients Situation, Background, Assessment and   Recommendations(SBAR). Information from the following report(s) SBAR, MAR and Recent Results was reviewed with the receiving nurse. Opportunity for questions and clarification was provided. Assessment completed upon patients arrival to unit and care assumed.

## 2017-12-22 NOTE — ED TRIAGE NOTES
PT arrived to ED via EMS c/o a fall. EMS states upon arrival they found PT on the floor with black tarry stool.

## 2017-12-22 NOTE — IP AVS SNAPSHOT
[place] 
 
 
 [address] 
[phone] Patient: Libertad Carlton MRN: [id number] YBK:[birth year] About your hospitalization You were admitted on:  December 22, 2017 You last received care in the:  [facility] 6 MED SURG You were discharged on:  December 26, 2017 Why you were hospitalized Your primary diagnosis was:  Upper Gi Bleed Your diagnoses also included:  Melena, Fall, Acute Blood Loss Anemia, Epigastric Pain, Incarcerated Incisional Hernia Things You Need To Do (next 8 weeks) Follow up with [facility]  
office will call you wt your follow up appointment Phone:  [phone] [facility]  [address] [facility] [address] Thursday Jan 04, 2018 Follow Up with Karyle Aden, MD at  2:45 PM  
Where:  [facility] MAIN ([facility] MAIN) Tuesday Feb 13, 2018 Annual Wellness Exam with [facility] at  1:45 PM  
Where:  [facility] ([address] [place] [address]) 6 MONTH with Viral Solomon MD at  2:15 PM  
Where:  [facility] ([address] [place] [address]) Discharge Orders None A check josias indicates which time of day the medication should be taken. My Medications TAKE these medications as instructed Instructions Each Dose to Equal  
 Morning Noon Evening Bedtime  
 amLODIPine 5 mg tablet Commonly known as:  Ana Maria Gables Your next dose is:  Tomorrow Morning TAKE 1 TABLET EVERY DAY  
     
  
   
   
   
  
 anastrozole 1 mg tablet Commonly known as:  ARIMIDEX Your next dose is:  Tomorrow Morning Take 1 Tab by mouth daily. 1 mg  
    
  
   
   
   
  
 aspirin delayed-release 81 mg tablet Your next dose is:  Tomorrow Morning Take 81 mg by mouth daily. 81 mg  
    
  
   
   
   
  
 atorvastatin 40 mg tablet Commonly known as:  LIPITOR Your next dose is:  Tomorrow Morning Take 1 Tab by mouth daily. 40 mg CALCIUM 600 + D 600-125 mg-unit Tab Generic drug:  calcium-cholecalciferol (d3) Your next dose is:  Tomorrow Morning Take 1,200 Units by mouth. 1200 Units CENTRUM SILVER PO Your next dose is:  Tomorrow Morning Take 1 Tab by mouth daily. 1 Tab  
    
  
   
   
   
  
 hydroCHLOROthiazide 25 mg tablet Commonly known as:  HYDRODIURIL Your next dose is:  Tomorrow Morning Take 1 Tab by mouth daily. 25 mg  
    
  
   
   
   
  
 loratadine 10 mg tablet Commonly known as:  Naveen Flores Your next dose is:  Tomorrow Morning Take 10 mg by mouth daily. 10 mg  
    
  
   
   
   
  
 pantoprazole 40 mg tablet Commonly known as:  PROTONIX Your next dose is: This evening Take 1 Tab by mouth ACB/HS. Indications: Twice a day for one month and then once daily. 40 mg PARoxetine 20 mg tablet Commonly known as:  PAXIL Your next dose is:  Tomorrow Morning TAKE 1 TABLET EVERY DAY  
     
  
   
   
   
  
 potassium chloride 20 mEq tablet Commonly known as:  K-DUR, KLOR-CON Take 1 Tab by mouth two (2) times a day. 20 mEq  
    
   
   
   
  
 traMADol 50 mg tablet Commonly known as:  ULTRAM  
Your next dose is: Take on as needed schedule Take 1 Tab by mouth every six (6) hours as needed for Pain. Max Daily Amount: 200 mg.  
 50 mg  
    
   
   
   
  
 VITAMIN D3 1,000 unit tablet Generic drug:  cholecalciferol Your next dose is:  Tomorrow Morning Take  by mouth daily. Where to Get Your Medications [place] [address] [facility] [address] Phone:  [phone]  
  pantoprazole 40 mg tablet Discharge Instructions Do not take NSAIDS (ibuprofen, motrin, aleve, advil, aspirin etc...) Follow-up with Dr Renee Bee for your hernia in 1 week in the office on a Wednesday at: 
[facility] [address] , [address] 
(Call for an appt time-->[phone]-->option 1) DISCHARGE SUMMARY from Nurse PATIENT INSTRUCTIONS: 
 
 
F-face looks uneven A-arms unable to move or move unevenly S-speech slurred or non-existent T-time-call 911 as soon as signs and symptoms begin-DO NOT go Back to bed or wait to see if you get better-TIME IS BRAIN. Warning Signs of HEART ATTACK Call 911 if you have these symptoms: 
? Chest discomfort. Most heart attacks involve discomfort in the center of the chest that lasts more than a few minutes, or that goes away and comes back. It can feel like uncomfortable pressure, squeezing, fullness, or pain. ? Discomfort in other areas of the upper body. Symptoms can include pain or discomfort in one or both arms, the back, neck, jaw, or stomach. ? Shortness of breath with or without chest discomfort. ? Other signs may include breaking out in a cold sweat, nausea, or lightheadedness. Don't wait more than five minutes to call [address]! Fast action can save your life. Calling 911 is almost always the fastest way to get lifesaving treatment. Emergency Medical Services staff can begin treatment when they arrive  up to an hour sooner than if someone gets to the hospital by car. The discharge information has been reviewed with the patient.   The patient verbalized understanding. Discharge medications reviewed with the patient and appropriate educational materials and side effects teaching were provided. ___________________________________________________________________________________________________________________________________ Introducing [facility]! Joao Hartman introduces Simpler patient portal. Now you can access parts of your medical record, email your doctor's office, and request medication refills online. 1. In your internet browser, go to [email or web address] 2. Click on the First Time User? Click Here link in the Sign In box. You will see the New Member Sign Up page. 3. Enter your Simpler Access Code exactly as it appears below. You will not need to use this code after youve completed the sign-up process. If you do not sign up before the expiration date, you must request a new code. · Simpler Access Code: [email or web address] Expires: 3/22/2018  4:09 AM 
 
4. Enter the last four digits of your Social Security Number (xxxx) and Date of Birth (mm/dd/yyyy) as indicated and click Submit. You will be taken to the next sign-up page. 5. Create a Simpler ID. This will be your Simpler login ID and cannot be changed, so think of one that is secure and easy to remember. 6. Create a Simpler password. You can change your password at any time. 7. Enter your Password Reset Question and Answer. This can be used at a later time if you forget your password. 8. Enter your e-mail address. You will receive e-mail notification when new information is available in [facility] 9. Click Sign Up. You can now view and download portions of your medical record. 10. Click the Download Summary menu link to download a portable copy of your medical information. If you have questions, please visit the Frequently Asked Questions section of the Simpler website.  Remember, Simpler is NOT to be used for urgent needs. For medical emergencies, dial 911. Now available from your iPhone and Android! Providers Seen During Your Hospitalization Provider Specialty Primary office phone Eveline Posadas MD Emergency Medicine [phone] Javier Garcia MD Internal Medicine [phone] Roe Cortés MD Internal Medicine [phone] Your Primary Care Physician (PCP) Primary Care Physician Office Phone Office Fax [facility] [phone] You are allergic to the following No active allergies Recent Documentation OB Status Smoking Status Hysterectomy Never Smoker Emergency Contacts Name Discharge Info Relation Home Work Mobile Leydi Dueñas   [phone] Patient Belongings The following personal items are in your possession at time of discharge: 
  Dental Appliances: Uppers, Lowers, With patient Please provide this summary of care documentation to your next provider. Signatures-by signing, you are acknowledging that this After Visit Summary has been reviewed with you and you have received a copy. Patient Signature:  ____________________________________________________________ Date:  ____________________________________________________________  
  
[facility] Signature:  ____________________________________________________________ Date:  ____________________________________________________________

## 2017-12-22 NOTE — PROGRESS NOTES
Spoke with the patient and son regarding discharge planning. The patient lives alone, her adult children live nearby. The patient drives and travels to visit her  who is in a nursing home every 3 days. The patient indicated she is normally very independent and plans to return to her home once stable for discharge. The patient indicated she will consider [facility] services, although she is hoping she will not need. CM will continue to follow. Care Management Interventions  PCP Verified by CM:  Yes  Mode of Transport at Discharge: Self  Transition of Care Consult (CM Consult): Discharge Planning  Physical Therapy Consult: Yes  Occupational Therapy Consult: Yes  Current Support Network: Family Lives Nearby  Confirm Follow Up Transport: Family  Plan discussed with Pt/Family/Caregiver: Yes  Freedom of Choice Offered: Yes  Discharge Location  Discharge Placement: Home

## 2017-12-22 NOTE — PROGRESS NOTES
Dual skin assessment complete with this primary nurse, and Odinandmedina RN. Skin intact. No breakdown noted.

## 2017-12-22 NOTE — PROGRESS NOTES
RN requested therapist to hold OT evaluation this morning because she is planning to give blood. Will check back as schedule permits.    Delphine Krishnamurthy, OTR/L

## 2017-12-22 NOTE — PROGRESS NOTES
GI/  EGD revealed black old blood with clots. No active bleeding seen. Large portion of the body of the stomach not visualized. Patient also had evidence for a prior fundoplication. Plan clear liquids and advance if stable. EGD only if recurrent bleeding. IV PPIs.   Raj De Los Santos MD

## 2017-12-22 NOTE — PROGRESS NOTES
PT Note:  PT orders received and chart review initiated. Patient off floor for procedure, will attempt PT evaluation another time/day as schedule permits.   Layton Whalen, PT, DPT

## 2017-12-22 NOTE — H&P
Date of Surgery Update:  Rachael Montesinos was seen and examined. History and physical has been reviewed. The patient has been examined.  There have been no significant clinical changes since the completion of the originally dated History and Physical.    Signed By: Galdino Leahy MD     December 22, 2017 3:25 PM

## 2017-12-23 LAB
ALBUMIN SERPL-MCNC: 2.3 G/DL (ref 3.2–4.6)
ALBUMIN/GLOB SERPL: 0.8 {RATIO} (ref 1.2–3.5)
ALP SERPL-CCNC: 52 U/L (ref 50–136)
ALT SERPL-CCNC: 26 U/L (ref 12–65)
ANION GAP SERPL CALC-SCNC: 9 MMOL/L (ref 7–16)
AST SERPL-CCNC: 27 U/L (ref 15–37)
BILIRUB SERPL-MCNC: 0.2 MG/DL (ref 0.2–1.1)
BUN SERPL-MCNC: 42 MG/DL (ref 8–23)
CALCIUM SERPL-MCNC: 8 MG/DL (ref 8.3–10.4)
CHLORIDE SERPL-SCNC: 109 MMOL/L (ref 98–107)
CO2 SERPL-SCNC: 26 MMOL/L (ref 21–32)
CREAT SERPL-MCNC: 0.63 MG/DL (ref 0.6–1)
GLOBULIN SER CALC-MCNC: 2.9 G/DL (ref 2.3–3.5)
GLUCOSE SERPL-MCNC: 183 MG/DL (ref 65–100)
HCT VFR BLD AUTO: 20.6 % (ref 35.8–46.3)
HCT VFR BLD AUTO: 23.1 % (ref 35.8–46.3)
HCT VFR BLD AUTO: 26 % (ref 35.8–46.3)
HGB BLD-MCNC: 6.8 G/DL (ref 11.7–15.4)
HGB BLD-MCNC: 7.5 G/DL (ref 11.7–15.4)
HGB BLD-MCNC: 8.2 G/DL (ref 11.7–15.4)
POTASSIUM SERPL-SCNC: 4.2 MMOL/L (ref 3.5–5.1)
PROT SERPL-MCNC: 5.2 G/DL (ref 6.3–8.2)
SODIUM SERPL-SCNC: 144 MMOL/L (ref 136–145)

## 2017-12-23 PROCEDURE — 74011250637 HC RX REV CODE- 250/637: Performed by: HOSPITALIST

## 2017-12-23 PROCEDURE — 74011250636 HC RX REV CODE- 250/636: Performed by: HOSPITALIST

## 2017-12-23 PROCEDURE — 80053 COMPREHEN METABOLIC PANEL: CPT | Performed by: HOSPITALIST

## 2017-12-23 PROCEDURE — 74011250636 HC RX REV CODE- 250/636: Performed by: ANESTHESIOLOGY

## 2017-12-23 PROCEDURE — C9113 INJ PANTOPRAZOLE SODIUM, VIA: HCPCS | Performed by: HOSPITALIST

## 2017-12-23 PROCEDURE — 77030013131 HC IV BLD ST ICUM -A

## 2017-12-23 PROCEDURE — 97165 OT EVAL LOW COMPLEX 30 MIN: CPT

## 2017-12-23 PROCEDURE — P9016 RBC LEUKOCYTES REDUCED: HCPCS

## 2017-12-23 PROCEDURE — 74011000250 HC RX REV CODE- 250: Performed by: HOSPITALIST

## 2017-12-23 PROCEDURE — 36415 COLL VENOUS BLD VENIPUNCTURE: CPT | Performed by: HOSPITALIST

## 2017-12-23 PROCEDURE — 65270000029 HC RM PRIVATE

## 2017-12-23 PROCEDURE — 85018 HEMOGLOBIN: CPT | Performed by: INTERNAL MEDICINE

## 2017-12-23 PROCEDURE — 36430 TRANSFUSION BLD/BLD COMPNT: CPT

## 2017-12-23 RX ORDER — SODIUM CHLORIDE 9 MG/ML
250 INJECTION, SOLUTION INTRAVENOUS AS NEEDED
Status: DISCONTINUED | OUTPATIENT
Start: 2017-12-23 | End: 2017-12-26 | Stop reason: HOSPADM

## 2017-12-23 RX ORDER — SODIUM CHLORIDE, SODIUM LACTATE, POTASSIUM CHLORIDE, CALCIUM CHLORIDE 600; 310; 30; 20 MG/100ML; MG/100ML; MG/100ML; MG/100ML
100 INJECTION, SOLUTION INTRAVENOUS CONTINUOUS
Status: DISCONTINUED | OUTPATIENT
Start: 2017-12-23 | End: 2017-12-23

## 2017-12-23 RX ORDER — SODIUM CHLORIDE 0.9 % (FLUSH) 0.9 %
5-10 SYRINGE (ML) INJECTION AS NEEDED
Status: DISCONTINUED | OUTPATIENT
Start: 2017-12-23 | End: 2017-12-26 | Stop reason: HOSPADM

## 2017-12-23 RX ADMIN — AMLODIPINE BESYLATE 2.5 MG: 5 TABLET ORAL at 09:01

## 2017-12-23 RX ADMIN — Medication 10 ML: at 17:21

## 2017-12-23 RX ADMIN — PAROXETINE HYDROCHLORIDE 20 MG: 20 TABLET, FILM COATED ORAL at 09:01

## 2017-12-23 RX ADMIN — ATORVASTATIN CALCIUM 40 MG: 40 TABLET, FILM COATED ORAL at 09:01

## 2017-12-23 RX ADMIN — SODIUM CHLORIDE, SODIUM LACTATE, POTASSIUM CHLORIDE, AND CALCIUM CHLORIDE 100 ML/HR: 600; 310; 30; 20 INJECTION, SOLUTION INTRAVENOUS at 10:34

## 2017-12-23 RX ADMIN — ANASTROZOLE 1 MG: 1 TABLET, COATED ORAL at 09:01

## 2017-12-23 RX ADMIN — SODIUM CHLORIDE 40 MG: 9 INJECTION INTRAMUSCULAR; INTRAVENOUS; SUBCUTANEOUS at 17:21

## 2017-12-23 RX ADMIN — Medication 10 ML: at 05:47

## 2017-12-23 RX ADMIN — Medication 10 ML: at 22:17

## 2017-12-23 RX ADMIN — SODIUM CHLORIDE 40 MG: 9 INJECTION INTRAMUSCULAR; INTRAVENOUS; SUBCUTANEOUS at 09:01

## 2017-12-23 NOTE — PROGRESS NOTES
Problem: Self Care Deficits Care Plan (Adult)  Goal: *Acute Goals and Plan of Care (Insert Text)  1. Patient will complete lower body bathing and dressing with supervision and adaptive equipment as needed. 2. Patient will complete toileting with supervision. 3. Patient will tolerate 30 minutes of OT treatment with 2-3 rest breaks to increase activity tolerance for ADLs. 4. Patient will complete functional transfers with supervision and adaptive equipment as needed. 5. Patient will complete functional mobility with supervision and appropriate safety for household distances. Timeframe: 7 visits       OCCUPATIONAL THERAPY: Initial Assessment and PM 12/23/2017  INPATIENT: Hospital Day: 2  Payor: Brandi Barbour / Plan: [address] [place] [address] HMO / Product Type: Managed Care Medicare /      NAME/AGE/GENDER: Arlene Anne is a 80 y.o. female   PRIMARY DIAGNOSIS:  Anemia, unspecified type [D64.9]  Melena [K92.1] Upper GI bleed Upper GI bleed  Procedure(s) (LRB):  ESOPHAGOGASTRODUODENOSCOPY (EGD)/625 (N/A)  1 Day Post-Op  ICD-10: Treatment Diagnosis:    · Generalized Muscle Weakness (M62.81)  · Other lack of cordination (R27.8)  · History of falling (Z91.81)   Precautions/Allergies:     Review of patient's allergies indicates no known allergies. ASSESSMENT:     Ms. Sav Villatoro presents to the hospital melena/anemia with upper GI bleed and low hgb. Pt's hgb back up to 8.2 with nursing stating that we could work with patient for therapy. Pt was supine in bed upon arrival. Pt is very pleasant, alert, and appropriate for her age. Pt typically independent and living alone but did have recent fall and activated her life alert. Pt has no c/o pain this afternoon. Pt was able to sit and stand to the edge of the bed with CGA and no symptoms. Pt completed functional mobility in the room using a rolling walker with CGA and no loss of balance. Pt positioned in the chair with pt reporting increased comfort with sitting up.  Pt aware to call nursing when getting up. Pt's son and daughter-in-law are present in the room and supportive to the patient. Overall, pt did quite well tolerating OT evaluation this afternoon with no complications or reports of pain. Pt is currently functioning below baseline at this time and will benefit from OT services to address stated goals and plan of care. This section established at most recent assessment   PROBLEM LIST (Impairments causing functional limitations):  1. Decreased Strength  2. Decreased ADL/Functional Activities  3. Decreased Transfer Abilities  4. Decreased Ambulation Ability/Technique  5. Decreased Balance  6. Decreased Activity Tolerance  7. Decreased Flexibility/Joint Mobility  8. Decreased Pleasants with Home Exercise Program   INTERVENTIONS PLANNED: (Benefits and precautions of occupational therapy have been discussed with the patient.)  1. Activities of daily living training  2. Adaptive equipment training  3. Balance training  4. Clothing management  5. Group therapy  6. Neuromuscular re-eduation  7. Therapeutic activity  8. Therapeutic exercise     TREATMENT PLAN: Frequency/Duration: Follow patient 3 times per week to address above goals. Rehabilitation Potential For Stated Goals: Excellent     RECOMMENDED REHABILITATION/EQUIPMENT: (at time of discharge pending progress): Due to the probability of continued deficits (see above) this patient will likely need continued skilled occupational therapy after discharge. Equipment:    TBD              OCCUPATIONAL PROFILE AND HISTORY:   History of Present Injury/Illness (Reason for Referral):  See H&P  Past Medical History/Comorbidities:   Ms. David Light  has a past medical history of Acute bronchitis; Anxiety state, unspecified; Cancer ([facility] [address] (12/13); Chest pain, unspecified; Depression; Depressive disorder, not elsewhere classified (5/13/2015); Disorder of bone and cartilage, unspecified (5/13/2015);  Enthesopathy of unspecified site (5/13/2015); Essential hypertension, benign (5/13/2015); GERD (gastroesophageal reflux disease); Hip, thigh, leg, and ankle, blister, without mention of infection; Hypercholesterolemia; Hypertension; Impaired fasting glucose (5/13/2015); Loss of height; Malignant neoplasm of breast (female), unspecified site; Mixed incontinence urge and stress (male)(female); Other and unspecified hyperlipidemia (5/13/2015); Other unspecified back disorder; Overweight(278.02); Psychiatric disorder; Unspecified asthma(493.90); Unspecified hemorrhoids without mention of complication (1/83/8179); and Unspecified menopausal and postmenopausal disorder. She also has no past medical history of Aneurysm ([address] Coagulation disorder ([address] Difficult intubation; Heart failure ([address] Malignant hyperthermia due to anesthesia; Morbid obesity ([address] Nausea & vomiting; Other ill-defined conditions(799.89); Pseudocholinesterase deficiency; Unspecified adverse effect of anesthesia; or Unspecified sleep apnea. Ms. Leandra Omalley  has a past surgical history that includes hx lap cholecystectomy (2001); pr abdomen surgery proc unlisted; hx hysterectomy (1970s); and hx breast lumpectomy (1/24/2014). Social History/Living Environment:   Home Environment: Private residence  One/Two Story Residence: One story  Living Alone: Yes  Support Systems: Child(marco a), [employer] community, Family member(s)  Patient Expects to be Discharged to[de-identified] Private residence  Current DME Used/Available at Home: dustin [place], Hospital bed, Walker, rolling, Wheelchair  Prior Level of Function/Work/Activity:  Pt lives alone. Pt typically independent with ADL/functional mobility, drives, and cooks her own meals. Pt visits spouse in nursing home 3x/week. Pt has life alert system at home. Pt has cane, walker, wheelchair, and hospital bed available at home.    Personal Factors:          Past/Current Experience:  Hx of recent fall        Other factors that influence how disability is experienced by the patient:  Multiple co-morbidities   Number of Personal Factors/Comorbidities that affect the Plan of Care: Expanded review of therapy/medical records (1-2):  MODERATE COMPLEXITY   ASSESSMENT OF OCCUPATIONAL PERFORMANCE[de-identified]   Activities of Daily Living:           Basic ADLs (From Assessment) Complex ADLs (From Assessment)   Basic ADL  Feeding: Supervision  Oral Facial Hygiene/Grooming: Stand-by assistance  Bathing: Moderate assistance  Upper Body Dressing: Minimum assistance  Lower Body Dressing: Moderate assistance  Toileting:  Moderate assistance Instrumental ADL  Meal Preparation: Moderate assistance  Homemaking: Maximum assistance   Grooming/Bathing/Dressing Activities of Daily Living     Cognitive Retraining  Safety/Judgement: Fall prevention                 Functional Transfers  Toilet Transfer : Contact guard assistance  Tub Transfer: Minimum assistance  Shower Transfer: Contact guard assistance     Bed/Mat Mobility  Rolling: Contact guard assistance  Supine to Sit: Contact guard assistance  Sit to Stand: Contact guard assistance  Bed to Chair: Contact guard assistance  Scooting: Contact guard assistance       Most Recent Physical Functioning:   Gross Assessment:  AROM: Generally decreased, functional (R shoulder chronically limited in AROM)  Strength: Generally decreased, functional (B UE)               Posture:     Balance:  Sitting: Intact  Standing: Impaired  Standing - Static: Good  Standing - Dynamic : Fair Bed Mobility:  Rolling: Contact guard assistance  Supine to Sit: Contact guard assistance  Scooting: Contact guard assistance  Wheelchair Mobility:     Transfers:  Sit to Stand: Contact guard assistance  Stand to Sit: Contact guard assistance  Bed to Chair: Contact guard assistance              Patient Vitals for the past 6 hrs:   BP SpO2 Pulse   12/23/17 1115 146/82 95 % 79       Mental Status  Neurologic State: Alert  Orientation Level: Appropriate for age, Oriented X4  Cognition: Appropriate decision making, Appropriate for age attention/concentration, Follows commands  Perception: Appears intact  Perseveration: No perseveration noted  Safety/Judgement: Fall prevention                          Physical Skills Involved:  1. Range of Motion  2. Balance  3. Strength  4. Activity Tolerance  5. Pain (acute) Cognitive Skills Affected (resulting in the inability to perform in a timely and safe manner):  1. WFL Psychosocial Skills Affected:  1. n/a   Number of elements that affect the Plan of Care: 3-5:  MODERATE COMPLEXITY   CLINICAL DECISION MAKIN[address] [place] [address] AM-PAC 6 Clicks   Daily Activity Inpatient Short Form  How much help from another person does the patient currently need. .. Total A Lot A Little None   1. Putting on and taking off regular lower body clothing? [] 1   [x] 2   [] 3   [] 4   2. Bathing (including washing, rinsing, drying)? [] 1   [x] 2   [] 3   [] 4   3. Toileting, which includes using toilet, bedpan or urinal?   [] 1   [x] 2   [] 3   [] 4   4. Putting on and taking off regular upper body clothing? [] 1   [] 2   [x] 3   [] 4   5. Taking care of personal grooming such as brushing teeth? [] 1   [] 2   [x] 3   [] 4   6. Eating meals? [] 1   [] 2   [x] 3   [] 4   © , Trustees of [address] [place] [address], under license to [employer]. All rights reserved      Score:  Initial: 15 Most Recent: X (Date: -- )    Interpretation of Tool:  Represents activities that are increasingly more difficult (i.e. Bed mobility, Transfers, Gait). Score 24 23 22-20 19-15 14-10 9-7 6     Modifier CH CI CJ CK CL CM CN      ?  Self Care:     - CURRENT STATUS: CK - 40%-59% impaired, limited or restricted    - GOAL STATUS: CJ - 20%-39% impaired, limited or restricted    - D/C STATUS:  ---------------To be determined---------------  Payor: Pradeep Maria / Plan: [address] [place] [address] HMO / Product Type: Managed Care Medicare /      Medical Necessity:     · Patient demonstrates excellent rehab potential due to higher previous functional level. Reason for Services/Other Comments:  · Patient continues to require skilled intervention due to decreased independence with ADL/functional mobility. Use of outcome tool(s) and clinical judgement create a POC that gives a: LOW COMPLEXITY         TREATMENT:   (In addition to Assessment/Re-Assessment sessions the following treatments were rendered)     Pre-treatment Symptoms/Complaints:    Pain: Initial:   Pain Intensity 1: 0  Post Session:  0/10     Assessment/Reassessment only, no treatment provided today    Braces/Orthotics/Lines/Etc:   · IV  Treatment/Session Assessment:    · Response to Treatment:  Evaluation only. · Interdisciplinary Collaboration:   o Occupational Therapist  o Registered Nurse  · After treatment position/precautions:   o Up in chair  o Bed/Chair-wheels locked  o Call light within reach  o RN notified  o Family at bedside   · Compliance with Program/Exercises: Will assess as treatment progresses. · Recommendations/Intent for next treatment session: \"Next visit will focus on advancements to more challenging activities and reduction in assistance provided\".   Total Treatment Duration:  OT Patient Time In/Time Out  Time In: 1455  Time Out: 220 Maryuri[employer]Rx Hardik, OT

## 2017-12-23 NOTE — PROGRESS NOTES
OT/PT Note: Orders received and reviewed, however pt has hgb of 6.8 and nurse asking to hold until next hgb reading. Will re-attempt as time permits.    Thank you,  Ayla John, OT

## 2017-12-23 NOTE — PROGRESS NOTES
Uneventful shift. Patient will be receiving blood. Hgb 6.8. Continue with black tarry stools. Hourly rounds completed throughout shift. Patient denies needs at this time. Will continue to monitor and give bedside report to oncoming day shift nurse.

## 2017-12-23 NOTE — PROGRESS NOTES
Hospitalist Progress Note    2017  Admit Date: 2017  3:55 AM   NAME: Eliceo Thompson   :  1935   MRN:  [id number]   Attending: Severo Dark, MD  PCP:  Ernesto Olmedo MD    SUBJECTIVE:   Patient is 80years old female brought in to ER via EMS after she signaled her life alert at home following a mechanical fall. Pt was found in a pool of black tarry stool. Hgb 7.5. Seen by GI and s/p 1U prbc and EGD 17 which showed black old blood in the stomach (per Dr Dom Doty) but he couldn't see a large portion of the body of stomach; however, he did not see active bleeding. : Feels better, no abd pain, no active bleeding, tolerating clears. Nursing notes and chart reviewed. Review of Systems negative with exception of pertinent positives noted above. PHYSICAL EXAM     Visit Vitals    /82    Pulse 79    Temp 99.3 °F (37.4 °C)    Resp 16    SpO2 95%      Temp (24hrs), Av.5 °F (36.9 °C), Min:98.2 °F (36.8 °C), Max:99.3 °F (37.4 °C)    Oxygen Therapy  O2 Sat (%): 95 % (17 1115)  Pulse via Oximetry: 87 beats per minute (17 1610)  O2 Device: Nasal cannula (17 1620)  O2 Flow Rate (L/min): 4 l/min (17 1620)    Intake/Output Summary (Last 24 hours) at 17 1747  Last data filed at 17 1735   Gross per 24 hour   Intake             2524 ml   Output                0 ml   Net             2524 ml       General: No acute distress. Alert.    Lungs:  CTABL. Heart:  RRR, no murmur, rub, or gallop  Abdomen: Soft, non-distended, non-tender, +bs  Extremities: No cyanosis or clubbing. Neurologic:  No focal deficits. Moves all extremities. Skin:  NO Rash  Psych:  Normal mood and affect    LABS AND STUDIES:  Personally reviewed all labs, meds, and studies for past 24hrs.       ASSESSMENT      Active Hospital Problems    Diagnosis Date Noted    Melena 2017    Upper GI bleed 2017    Fall 2017    Acute blood loss anemia 2017 PLAN:  · Appreciate GI input. Will transfuse another unit and trend HGB. Advance diet as tolarated. · On PPI  · Fall precautions  · On appropriate home meds    Dispo: Likely in am    DVT ppx:  SCDs  Discussed plan with pt who is in agreement. All questions answered.     Signed By: Hanna Lima MD     December 23, 2017

## 2017-12-23 NOTE — PROGRESS NOTES
GI DAILY PROGRESS NOTE    Admit Date: 12/22/2017    CC: UGI bleed    Subjective:     Patient had melenic BMs after the EGD 12-22-17 which showed black old blood in the stomach (per Dr Floyd Covarrubias) but he couldn't see a large portion of the body of stomach; however, he did not see active bleeding. Patient has not had a BM today. No abd pains. No N/V. Tolerating CL diet.     Medications:  Current Facility-Administered Medications   Medication Dose Route Frequency    lactated Ringers infusion  100 mL/hr IntraVENous CONTINUOUS    sodium chloride (NS) flush 5-10 mL  5-10 mL IntraVENous PRN    0.9% sodium chloride infusion 250 mL  250 mL IntraVENous PRN    anastrozole (ARIMIDEX) tablet 1 mg  1 mg Oral DAILY    atorvastatin (LIPITOR) tablet 40 mg  40 mg Oral DAILY    PARoxetine (PAXIL) tablet 20 mg  20 mg Oral DAILY    amLODIPine (NORVASC) tablet 2.5 mg  2.5 mg Oral DAILY    sodium chloride (NS) flush 5-10 mL  5-10 mL IntraVENous Q8H    sodium chloride (NS) flush 5-10 mL  5-10 mL IntraVENous PRN    ondansetron (ZOFRAN) injection 4 mg  4 mg IntraVENous Q4H PRN    pantoprazole (PROTONIX) 40 mg in sodium chloride 0.9 % 10 mL injection  40 mg IntraVENous BID    phenol throat spray (CHLORASEPTIC) 1 Spray  1 Spray Oral PRN    acetaminophen (TYLENOL) tablet 650 mg  650 mg Oral Q6H PRN    lactated Ringers infusion  100 mL/hr IntraVENous CONTINUOUS    famotidine (PF) (PEPCID) 20 mg in sodium chloride 0.9 % 10 mL injection  20 mg IntraVENous ONCE PRN    famotidine (PEPCID) tablet 20 mg  20 mg Oral PRN       Objective:   Vitals:  Visit Vitals    /70    Pulse 84    Temp 98.8 °F (37.1 °C)    Resp 16    SpO2 93%       Intake/Output:  12/23 0701 - 12/23 1900  In: 580 [P.O.:580]  Out: -   12/21 1901 - 12/23 0700  In: 250 [I.V.:250]  Out: 0     Exam:    Data Review (Labs):    Recent Results (from the past 24 hour(s))   HGB & HCT    Collection Time: 12/22/17  5:06 PM   Result Value Ref Range    HGB 8.5 (L) 11.7 - 15.4 g/dL    HCT 26.3 (L) 35.8 - 77.3 %   METABOLIC PANEL, COMPREHENSIVE    Collection Time: 12/23/17  1:45 AM   Result Value Ref Range    Sodium 144 136 - 145 mmol/L    Potassium 4.2 3.5 - 5.1 mmol/L    Chloride 109 (H) 98 - 107 mmol/L    CO2 26 21 - 32 mmol/L    Anion gap 9 7 - 16 mmol/L    Glucose 183 (H) 65 - 100 mg/dL    BUN 42 (H) 8 - 23 MG/DL    Creatinine 0.63 0.6 - 1.0 MG/DL    GFR est AA >60 >60 ml/min/1.73m2    GFR est non-AA >60 >60 ml/min/1.73m2    Calcium 8.0 (L) 8.3 - 10.4 MG/DL    Bilirubin, total 0.2 0.2 - 1.1 MG/DL    ALT (SGPT) 26 12 - 65 U/L    AST (SGOT) 27 15 - 37 U/L    Alk. phosphatase 52 50 - 136 U/L    Protein, total 5.2 (L) 6.3 - 8.2 g/dL    Albumin 2.3 (L) 3.2 - 4.6 g/dL    Globulin 2.9 2.3 - 3.5 g/dL    A-G Ratio 0.8 (L) 1.2 - 3.5     HGB & HCT    Collection Time: 12/23/17  1:45 AM   Result Value Ref Range    HGB 6.8 (LL) 11.7 - 15.4 g/dL    HCT 20.6 (LL) 35.8 - 46.3 %       Assessment:     Principal Problem:    Upper GI bleed (12/22/2017)    Active Problems:    Melena (12/22/2017)      Fall (12/22/2017)      Acute blood loss anemia (12/22/2017)        Plan:     UGI bleed. Patient getting more blood transfusion. I can't tell whether yesterday's melenic BMs were just clearing out of the blood seen in the stomach or recurrent bleed. No bleeding this morning. Continue to monitor and continue iv PPI. Will continue CL diet for now.

## 2017-12-24 LAB
ALBUMIN SERPL-MCNC: 2.7 G/DL (ref 3.2–4.6)
ALBUMIN/GLOB SERPL: 0.9 {RATIO} (ref 1.2–3.5)
ALP SERPL-CCNC: 57 U/L (ref 50–136)
ALT SERPL-CCNC: 33 U/L (ref 12–65)
ANION GAP SERPL CALC-SCNC: 9 MMOL/L (ref 7–16)
AST SERPL-CCNC: 45 U/L (ref 15–37)
BACTERIA SPEC CULT: NORMAL
BILIRUB SERPL-MCNC: 0.4 MG/DL (ref 0.2–1.1)
BUN SERPL-MCNC: 13 MG/DL (ref 8–23)
CALCIUM SERPL-MCNC: 8.3 MG/DL (ref 8.3–10.4)
CHLORIDE SERPL-SCNC: 108 MMOL/L (ref 98–107)
CO2 SERPL-SCNC: 27 MMOL/L (ref 21–32)
CREAT SERPL-MCNC: 0.45 MG/DL (ref 0.6–1)
GLOBULIN SER CALC-MCNC: 3 G/DL (ref 2.3–3.5)
GLUCOSE BLD STRIP.AUTO-MCNC: 177 MG/DL (ref 65–100)
GLUCOSE SERPL-MCNC: 141 MG/DL (ref 65–100)
HCT VFR BLD AUTO: 24 % (ref 35.8–46.3)
HCT VFR BLD AUTO: 26 % (ref 35.8–46.3)
HCT VFR BLD AUTO: 30.6 % (ref 35.8–46.3)
HGB BLD-MCNC: 10.1 G/DL (ref 11.7–15.4)
HGB BLD-MCNC: 7.7 G/DL (ref 11.7–15.4)
HGB BLD-MCNC: 8.2 G/DL (ref 11.7–15.4)
POTASSIUM SERPL-SCNC: 3.5 MMOL/L (ref 3.5–5.1)
PROT SERPL-MCNC: 5.7 G/DL (ref 6.3–8.2)
SERVICE CMNT-IMP: NORMAL
SODIUM SERPL-SCNC: 144 MMOL/L (ref 136–145)

## 2017-12-24 PROCEDURE — 74011250637 HC RX REV CODE- 250/637: Performed by: HOSPITALIST

## 2017-12-24 PROCEDURE — 36430 TRANSFUSION BLD/BLD COMPNT: CPT

## 2017-12-24 PROCEDURE — 80053 COMPREHEN METABOLIC PANEL: CPT | Performed by: HOSPITALIST

## 2017-12-24 PROCEDURE — 77030013131 HC IV BLD ST ICUM -A

## 2017-12-24 PROCEDURE — 36415 COLL VENOUS BLD VENIPUNCTURE: CPT | Performed by: HOSPITALIST

## 2017-12-24 PROCEDURE — 74011250636 HC RX REV CODE- 250/636: Performed by: HOSPITALIST

## 2017-12-24 PROCEDURE — 76937 US GUIDE VASCULAR ACCESS: CPT

## 2017-12-24 PROCEDURE — 85018 HEMOGLOBIN: CPT | Performed by: INTERNAL MEDICINE

## 2017-12-24 PROCEDURE — C9113 INJ PANTOPRAZOLE SODIUM, VIA: HCPCS | Performed by: HOSPITALIST

## 2017-12-24 PROCEDURE — 65270000029 HC RM PRIVATE

## 2017-12-24 PROCEDURE — 74011000250 HC RX REV CODE- 250: Performed by: HOSPITALIST

## 2017-12-24 PROCEDURE — 82962 GLUCOSE BLOOD TEST: CPT

## 2017-12-24 PROCEDURE — P9016 RBC LEUKOCYTES REDUCED: HCPCS

## 2017-12-24 RX ORDER — SODIUM CHLORIDE 9 MG/ML
250 INJECTION, SOLUTION INTRAVENOUS AS NEEDED
Status: DISCONTINUED | OUTPATIENT
Start: 2017-12-24 | End: 2017-12-26 | Stop reason: HOSPADM

## 2017-12-24 RX ADMIN — ATORVASTATIN CALCIUM 40 MG: 40 TABLET, FILM COATED ORAL at 08:47

## 2017-12-24 RX ADMIN — Medication 10 ML: at 14:00

## 2017-12-24 RX ADMIN — SODIUM CHLORIDE 40 MG: 9 INJECTION INTRAMUSCULAR; INTRAVENOUS; SUBCUTANEOUS at 19:37

## 2017-12-24 RX ADMIN — PAROXETINE HYDROCHLORIDE 20 MG: 20 TABLET, FILM COATED ORAL at 08:47

## 2017-12-24 RX ADMIN — Medication 10 ML: at 23:35

## 2017-12-24 RX ADMIN — AMLODIPINE BESYLATE 2.5 MG: 5 TABLET ORAL at 08:47

## 2017-12-24 RX ADMIN — ANASTROZOLE 1 MG: 1 TABLET, COATED ORAL at 08:45

## 2017-12-24 NOTE — PROGRESS NOTES
Attempted physical therapy evaluation a second time this morning. Nurse reports her hemoglobin is low and she is to receive blood. Will attempt physical therapy evaluation lateral as schedule allows and patient status appropriate.      Nancy Mojica, PT  12/24/2017

## 2017-12-24 NOTE — PROGRESS NOTES
Hospitalist Progress Note    2017  Admit Date: 2017  3:55 AM   NAME: Libertad Carlton   :  1935   MRN:  [id number]   Attending: Kia Segura MD  PCP:  Viral Solomon MD    SUBJECTIVE:   Patient is 80years old female brought in to ER via EMS after she signaled her life alert at home following a mechanical fall. Pt was found in a pool of black tarry stool. Hgb 7.5. Seen by GI and s/p 1U prbc and EGD 17 which showed black old blood in the stomach (per Dr Raegan Yip) but he couldn't see a large portion of the body of stomach; however, he did not see active bleeding. : Feels better, no abd pain, tolerating clears, Had 1 Tarry stool early am, Hgb dropped to 7.7 from 8.2. Nursing notes and chart reviewed. Review of Systems negative with exception of pertinent positives noted above. PHYSICAL EXAM     Visit Vitals    /77    Pulse 77    Temp 97.9 °F (36.6 °C)    Resp 16    SpO2 97%      Temp (24hrs), Av.2 °F (36.8 °C), Min:97.6 °F (36.4 °C), Max:99.3 °F (37.4 °C)    Oxygen Therapy  O2 Sat (%): 97 % (17 0800)  Pulse via Oximetry: 87 beats per minute (17 1610)  O2 Device: Nasal cannula (17 1620)  O2 Flow Rate (L/min): 4 l/min (17 1620)    Intake/Output Summary (Last 24 hours) at 17 0932  Last data filed at 17 1735   Gross per 24 hour   Intake             1944 ml   Output                0 ml   Net             1944 ml       General: No acute distress. Alert.    Lungs:  CTABL. Heart:  RRR, no murmur, rub, or gallop  Abdomen: Soft, non-distended, non-tender, +bs  Extremities: No cyanosis or clubbing. Neurologic:  No focal deficits. Moves all extremities. Skin:  NO Rash  Psych:  Normal mood and affect    LABS AND STUDIES:  Personally reviewed all labs, meds, and studies for past 24hrs.       ASSESSMENT      Active Hospital Problems    Diagnosis Date Noted    Melena 2017    Upper GI bleed 2017    Fall 2017    Acute blood loss anemia 12/22/2017       PLAN:    · Appreciate GI input. Will transfuse another unit and trend HGB. Keep on clears, may need another EGD tomorrow. · On PPI IV BiD  · Fall precautions  · On appropriate home meds    Dispo: When ok with GI. Likely on Tuesday. DVT ppx:  SCDs  Discussed plan with pt who is in agreement. All questions answered.     Signed By: Filippo Bear MD     December 24, 2017

## 2017-12-24 NOTE — PROGRESS NOTES
GI DAILY PROGRESS NOTE    Admit Date: 12/22/2017    CC: UGI bleed    Subjective:     Patient had melenic BMs after the EGD 12-22-17 which showed black old blood in the stomach (per Dr William Ferreira) but he couldn't see a large portion of the body of stomach; however, he did not see active bleeding. No abd pains. No N/V. Tolerating CL diet. She states she passed a black BM at 5A today; however, nursing did not document any BMs so this BM seems to be unwitnessed. Medications:  Current Facility-Administered Medications   Medication Dose Route Frequency    sodium chloride (NS) flush 5-10 mL  5-10 mL IntraVENous PRN    0.9% sodium chloride infusion 250 mL  250 mL IntraVENous PRN    anastrozole (ARIMIDEX) tablet 1 mg  1 mg Oral DAILY    atorvastatin (LIPITOR) tablet 40 mg  40 mg Oral DAILY    PARoxetine (PAXIL) tablet 20 mg  20 mg Oral DAILY    amLODIPine (NORVASC) tablet 2.5 mg  2.5 mg Oral DAILY    sodium chloride (NS) flush 5-10 mL  5-10 mL IntraVENous Q8H    sodium chloride (NS) flush 5-10 mL  5-10 mL IntraVENous PRN    ondansetron (ZOFRAN) injection 4 mg  4 mg IntraVENous Q4H PRN    pantoprazole (PROTONIX) 40 mg in sodium chloride 0.9 % 10 mL injection  40 mg IntraVENous BID    phenol throat spray (CHLORASEPTIC) 1 Spray  1 Spray Oral PRN    acetaminophen (TYLENOL) tablet 650 mg  650 mg Oral Q6H PRN    famotidine (PF) (PEPCID) 20 mg in sodium chloride 0.9 % 10 mL injection  20 mg IntraVENous ONCE PRN    famotidine (PEPCID) tablet 20 mg  20 mg Oral PRN       Objective:   Vitals:  Visit Vitals    /77    Pulse 77    Temp 97.9 °F (36.6 °C)    Resp 16    SpO2 97%       Intake/Output:     12/22 1901 - 12/24 0700  In: 2709 [P.O.:1420;  I.V.:1104]  Out: -     Exam:    Data Review (Labs):    Recent Results (from the past 24 hour(s))   HGB & HCT    Collection Time: 12/23/17 11:23 AM   Result Value Ref Range    HGB 8.2 (L) 11.7 - 15.4 g/dL    HCT 26.0 (L) 35.8 - 46.3 %   HGB & HCT    Collection Time: 12/23/17  5:01 PM   Result Value Ref Range    HGB 7.5 (L) 11.7 - 15.4 g/dL    HCT 23.1 (L) 35.8 - 46.3 %   HGB & HCT    Collection Time: 12/24/17 12:49 AM   Result Value Ref Range    HGB 8.2 (L) 11.7 - 15.4 g/dL    HCT 26.0 (L) 35.8 - 74.4 %   METABOLIC PANEL, COMPREHENSIVE    Collection Time: 12/24/17  6:50 AM   Result Value Ref Range    Sodium 144 136 - 145 mmol/L    Potassium 3.5 3.5 - 5.1 mmol/L    Chloride 108 (H) 98 - 107 mmol/L    CO2 27 21 - 32 mmol/L    Anion gap 9 7 - 16 mmol/L    Glucose 141 (H) 65 - 100 mg/dL    BUN 13 8 - 23 MG/DL    Creatinine 0.45 (L) 0.6 - 1.0 MG/DL    GFR est AA >60 >60 ml/min/1.73m2    GFR est non-AA >60 >60 ml/min/1.73m2    Calcium 8.3 8.3 - 10.4 MG/DL    Bilirubin, total 0.4 0.2 - 1.1 MG/DL    ALT (SGPT) 33 12 - 65 U/L    AST (SGOT) 45 (H) 15 - 37 U/L    Alk. phosphatase 57 50 - 136 U/L    Protein, total 5.7 (L) 6.3 - 8.2 g/dL    Albumin 2.7 (L) 3.2 - 4.6 g/dL    Globulin 3.0 2.3 - 3.5 g/dL    A-G Ratio 0.9 (L) 1.2 - 3.5         Assessment:     Principal Problem:    Upper GI bleed (12/22/2017)    Active Problems:    Melena (12/22/2017)      Fall (12/22/2017)      Acute blood loss anemia (12/22/2017)        Plan:     UGI bleed. Patient received 2 U PRBCs on 12-22-17. Her Hgb for the past 2 days waxes and wanes, so unclear how much of this is lab precision problem of actual trend. If her Hgb continues to drop, then will consider EGD tomorrow [place] with Dr Clarisse Heredia. If stable then can go home on PPI BID.

## 2017-12-25 ENCOUNTER — APPOINTMENT (OUTPATIENT)
Dept: CT IMAGING | Age: 82
DRG: 378 | End: 2017-12-25
Attending: SURGERY
Payer: MEDICARE

## 2017-12-25 PROBLEM — R10.13 EPIGASTRIC PAIN: Status: ACTIVE | Noted: 2017-12-25

## 2017-12-25 PROBLEM — K43.0 INCARCERATED INCISIONAL HERNIA: Status: ACTIVE | Noted: 2017-12-25

## 2017-12-25 LAB
ALBUMIN SERPL-MCNC: 2.7 G/DL (ref 3.2–4.6)
ALBUMIN/GLOB SERPL: 0.9 {RATIO} (ref 1.2–3.5)
ALP SERPL-CCNC: 73 U/L (ref 50–136)
ALT SERPL-CCNC: 35 U/L (ref 12–65)
ANION GAP SERPL CALC-SCNC: 10 MMOL/L (ref 7–16)
AST SERPL-CCNC: 51 U/L (ref 15–37)
BILIRUB SERPL-MCNC: 0.7 MG/DL (ref 0.2–1.1)
BUN SERPL-MCNC: 7 MG/DL (ref 8–23)
CALCIUM SERPL-MCNC: 8.1 MG/DL (ref 8.3–10.4)
CHLORIDE SERPL-SCNC: 110 MMOL/L (ref 98–107)
CO2 SERPL-SCNC: 25 MMOL/L (ref 21–32)
CREAT SERPL-MCNC: 0.45 MG/DL (ref 0.6–1)
GLOBULIN SER CALC-MCNC: 3 G/DL (ref 2.3–3.5)
GLUCOSE SERPL-MCNC: 116 MG/DL (ref 65–100)
HCT VFR BLD AUTO: 27.6 % (ref 35.8–46.3)
HCT VFR BLD AUTO: 29.1 % (ref 35.8–46.3)
HCT VFR BLD AUTO: 29.6 % (ref 35.8–46.3)
HGB BLD-MCNC: 9.1 G/DL (ref 11.7–15.4)
HGB BLD-MCNC: 9.5 G/DL (ref 11.7–15.4)
HGB BLD-MCNC: 9.5 G/DL (ref 11.7–15.4)
POTASSIUM SERPL-SCNC: 3.4 MMOL/L (ref 3.5–5.1)
PROT SERPL-MCNC: 5.7 G/DL (ref 6.3–8.2)
SODIUM SERPL-SCNC: 145 MMOL/L (ref 136–145)

## 2017-12-25 PROCEDURE — 65270000029 HC RM PRIVATE

## 2017-12-25 PROCEDURE — 74011250636 HC RX REV CODE- 250/636: Performed by: SURGERY

## 2017-12-25 PROCEDURE — 74011636320 HC RX REV CODE- 636/320: Performed by: HOSPITALIST

## 2017-12-25 PROCEDURE — 74011250636 HC RX REV CODE- 250/636: Performed by: HOSPITALIST

## 2017-12-25 PROCEDURE — 74177 CT ABD & PELVIS W/CONTRAST: CPT

## 2017-12-25 PROCEDURE — C9113 INJ PANTOPRAZOLE SODIUM, VIA: HCPCS | Performed by: HOSPITALIST

## 2017-12-25 PROCEDURE — 85018 HEMOGLOBIN: CPT | Performed by: INTERNAL MEDICINE

## 2017-12-25 PROCEDURE — 74011000258 HC RX REV CODE- 258: Performed by: HOSPITALIST

## 2017-12-25 PROCEDURE — 74011250637 HC RX REV CODE- 250/637: Performed by: HOSPITALIST

## 2017-12-25 PROCEDURE — 36415 COLL VENOUS BLD VENIPUNCTURE: CPT | Performed by: HOSPITALIST

## 2017-12-25 PROCEDURE — 74011000258 HC RX REV CODE- 258: Performed by: SURGERY

## 2017-12-25 PROCEDURE — 74011000250 HC RX REV CODE- 250: Performed by: HOSPITALIST

## 2017-12-25 PROCEDURE — 80053 COMPREHEN METABOLIC PANEL: CPT | Performed by: HOSPITALIST

## 2017-12-25 RX ORDER — SODIUM CHLORIDE 0.9 % (FLUSH) 0.9 %
10 SYRINGE (ML) INJECTION
Status: COMPLETED | OUTPATIENT
Start: 2017-12-25 | End: 2017-12-25

## 2017-12-25 RX ORDER — DEXTROSE, SODIUM CHLORIDE, AND POTASSIUM CHLORIDE 5; .45; .15 G/100ML; G/100ML; G/100ML
100 INJECTION INTRAVENOUS CONTINUOUS
Status: DISCONTINUED | OUTPATIENT
Start: 2017-12-25 | End: 2017-12-25 | Stop reason: SDUPTHER

## 2017-12-25 RX ADMIN — Medication 10 ML: at 06:06

## 2017-12-25 RX ADMIN — SODIUM CHLORIDE 40 MG: 9 INJECTION INTRAMUSCULAR; INTRAVENOUS; SUBCUTANEOUS at 17:39

## 2017-12-25 RX ADMIN — IOPAMIDOL 100 ML: 755 INJECTION, SOLUTION INTRAVENOUS at 15:40

## 2017-12-25 RX ADMIN — SODIUM CHLORIDE 40 MG: 9 INJECTION INTRAMUSCULAR; INTRAVENOUS; SUBCUTANEOUS at 08:53

## 2017-12-25 RX ADMIN — AMLODIPINE BESYLATE 2.5 MG: 5 TABLET ORAL at 08:54

## 2017-12-25 RX ADMIN — ATORVASTATIN CALCIUM 40 MG: 40 TABLET, FILM COATED ORAL at 08:54

## 2017-12-25 RX ADMIN — DIATRIZOATE MEGLUMINE AND DIATRIZOATE SODIUM 15 ML: 600; 100 SOLUTION ORAL; RECTAL at 13:14

## 2017-12-25 RX ADMIN — POTASSIUM CHLORIDE: 2 INJECTION, SOLUTION, CONCENTRATE INTRAVENOUS at 16:35

## 2017-12-25 RX ADMIN — ANASTROZOLE 1 MG: 1 TABLET, COATED ORAL at 11:35

## 2017-12-25 RX ADMIN — Medication 10 ML: at 23:17

## 2017-12-25 RX ADMIN — Medication 10 ML: at 16:36

## 2017-12-25 RX ADMIN — Medication 10 ML: at 13:23

## 2017-12-25 RX ADMIN — PAROXETINE HYDROCHLORIDE 20 MG: 20 TABLET, FILM COATED ORAL at 08:53

## 2017-12-25 RX ADMIN — SODIUM CHLORIDE 100 ML: 900 INJECTION, SOLUTION INTRAVENOUS at 15:40

## 2017-12-25 NOTE — PROGRESS NOTES
Blood transfusion flow chart in nursing shows 1 ordered unit of PRBCs finished at 1900 on 12/24. This was ordered early in the day with pt being a difficult PIV access and requiring VAT intervention twice. Suitable access was obtained and sustained by VAT later in the day their schedule allowed and the unit of prbcs was infused. Refer to nursing note of 12/24 regarding pt report of dark stool that was not witnessed.

## 2017-12-25 NOTE — PROGRESS NOTES
Rounded every hour and prn. PIV achieved by VAT x2, 1 uprbc's infused. Pt reported 1 dark stool although this was not witnessed. Pt has not history of being unreliable with writer. Denied pain. Had daughter in law at bedside part of shift.

## 2017-12-25 NOTE — PROGRESS NOTES
Uneventful shift. Hgb 10. Hourly rounds completed throughout shift. Patient denies needs at this time. Will continue to monitor and give bedside report to oncoming day shift nurse.

## 2017-12-25 NOTE — PROGRESS NOTES
Hospitalist Progress Note    2017  Admit Date: 2017  3:55 AM   NAME: Arlene Anne   :  1935   MRN:  [id number]   Attending: Kaden Peralta MD  PCP:  Caity Edwards MD    SUBJECTIVE:   Patient is 80years old female brought in to ER via EMS after she signaled her life alert at home following a mechanical fall. Pt was found in a pool of black tarry stool. Hgb 7.5. Seen by GI and s/p 1U prbc and EGD 17 which showed black old blood in the stomach (per Dr Dottie Olszewski) but he couldn't see a large portion of the body of stomach; however, he did not see active bleeding. GI bleed resolved after 3U prbcs. Surgery consulted for Ventral abdominal wall hernia. : Feels worse, points towards hernia that hurts, tolerating clears, Had 1 stool early am with mixed color and less dark, Hgb 9.5 today. Nursing notes and chart reviewed. Review of Systems negative with exception of pertinent positives noted above. PHYSICAL EXAM     Visit Vitals    /74    Pulse 79    Temp 98 °F (36.7 °C)    Resp 20    SpO2 97%      Temp (24hrs), Av.1 °F (36.7 °C), Min:97.3 °F (36.3 °C), Max:98.7 °F (37.1 °C)    Oxygen Therapy  O2 Sat (%): 97 % (17 0700)  Pulse via Oximetry: 87 beats per minute (17 1610)  O2 Device: Nasal cannula (17 1620)  O2 Flow Rate (L/min): 4 l/min (17 1620)    Intake/Output Summary (Last 24 hours) at 17 0932  Last data filed at 17 0910   Gross per 24 hour   Intake              440 ml   Output                0 ml   Net              440 ml       General: No acute distress. Alert.    Lungs:  CTABL. Heart:  RRR, no murmur, rub, or gallop  Abdomen: Soft, non-distended, Ventral wall hernia above umbilicus, irreducible, tender, +bs  Extremities: No cyanosis or clubbing. Neurologic:  No focal deficits. Moves all extremities.   Skin:  NO Rash  Psych:  Anxious    LABS AND STUDIES:  Personally reviewed all labs, meds, and studies for past 24hrs.      ASSESSMENT      Active Hospital Problems    Diagnosis Date Noted    Melena 12/22/2017    Upper GI bleed 12/22/2017    Fall 12/22/2017    Acute blood loss anemia 12/22/2017       PLAN:    · Appreciate GI input. No more GI Bleed, On PPI IV BiD. Hgb stable at 9.5. · Surgery consulted for abd hernia that appears incarcerated, tender and irreducible. · Prn pain meds  · NPO and on IVF  · Fall precautions  · On appropriate home meds    High risk given recent GI bleed and incarcerated hernia. Dispo: OK from GI standpoint, will await surgery input. DVT ppx:  SCDs  Discussed plan with pt who is in agreement. All questions answered.     Signed By: Travon Downs MD     December 25, 2017

## 2017-12-25 NOTE — PROGRESS NOTES
GI DAILY PROGRESS NOTE    Admit Date: 12/22/2017    CC: UGI bleed    Subjective:     Patient had melenic BMs after the EGD 12-22-17 which showed black old blood in the stomach (per Dr Zeynep York) but he couldn't see a large portion of the body of stomach; however, he did not see active bleeding. No abd pains. No N/V. She states she passed a black BM at 5A and in the afternoon of 12-24-17; however, nursing did not document any BMs so this BM seems to be unwitnessed (nothing recorded in I/O nursing). Patient apparently received a unit PRBC 12-24-17 (per hospitalist note but I don't see this in blood bank records), but her Hgb today seems well above what I would expect as a response to Hgb 7.7.     Medications:  Current Facility-Administered Medications   Medication Dose Route Frequency    0.9% sodium chloride infusion 250 mL  250 mL IntraVENous PRN    sodium chloride (NS) flush 5-10 mL  5-10 mL IntraVENous PRN    0.9% sodium chloride infusion 250 mL  250 mL IntraVENous PRN    anastrozole (ARIMIDEX) tablet 1 mg  1 mg Oral DAILY    atorvastatin (LIPITOR) tablet 40 mg  40 mg Oral DAILY    PARoxetine (PAXIL) tablet 20 mg  20 mg Oral DAILY    amLODIPine (NORVASC) tablet 2.5 mg  2.5 mg Oral DAILY    sodium chloride (NS) flush 5-10 mL  5-10 mL IntraVENous Q8H    sodium chloride (NS) flush 5-10 mL  5-10 mL IntraVENous PRN    ondansetron (ZOFRAN) injection 4 mg  4 mg IntraVENous Q4H PRN    pantoprazole (PROTONIX) 40 mg in sodium chloride 0.9 % 10 mL injection  40 mg IntraVENous BID    phenol throat spray (CHLORASEPTIC) 1 Spray  1 Spray Oral PRN    acetaminophen (TYLENOL) tablet 650 mg  650 mg Oral Q6H PRN    famotidine (PF) (PEPCID) 20 mg in sodium chloride 0.9 % 10 mL injection  20 mg IntraVENous ONCE PRN    famotidine (PEPCID) tablet 20 mg  20 mg Oral PRN       Objective:   Vitals:  Visit Vitals    /74    Pulse 79    Temp 98 °F (36.7 °C)    Resp 20    SpO2 97%       Intake/Output:  12/25 0701 - 12/25 1900  In: 240 [P.O.:240]  Out: -   12/23 1901 - 12/25 0700  In: 440 [P.O.:440]  Out: -     Exam:    Data Review (Labs):    Recent Results (from the past 24 hour(s))   GLUCOSE, POC    Collection Time: 12/24/17 12:11 PM   Result Value Ref Range    Glucose (POC) 177 (H) 65 - 100 mg/dL   HGB & HCT    Collection Time: 12/24/17 10:09 PM   Result Value Ref Range    HGB 10.1 (L) 11.7 - 15.4 g/dL    HCT 30.6 (L) 35.8 - 27.8 %   METABOLIC PANEL, COMPREHENSIVE    Collection Time: 12/25/17  4:02 AM   Result Value Ref Range    Sodium 145 136 - 145 mmol/L    Potassium 3.4 (L) 3.5 - 5.1 mmol/L    Chloride 110 (H) 98 - 107 mmol/L    CO2 25 21 - 32 mmol/L    Anion gap 10 7 - 16 mmol/L    Glucose 116 (H) 65 - 100 mg/dL    BUN 7 (L) 8 - 23 MG/DL    Creatinine 0.45 (L) 0.6 - 1.0 MG/DL    GFR est AA >60 >60 ml/min/1.73m2    GFR est non-AA >60 >60 ml/min/1.73m2    Calcium 8.1 (L) 8.3 - 10.4 MG/DL    Bilirubin, total 0.7 0.2 - 1.1 MG/DL    ALT (SGPT) 35 12 - 65 U/L    AST (SGOT) 51 (H) 15 - 37 U/L    Alk. phosphatase 73 50 - 136 U/L    Protein, total 5.7 (L) 6.3 - 8.2 g/dL    Albumin 2.7 (L) 3.2 - 4.6 g/dL    Globulin 3.0 2.3 - 3.5 g/dL    A-G Ratio 0.9 (L) 1.2 - 3.5     HGB & HCT    Collection Time: 12/25/17  4:02 AM   Result Value Ref Range    HGB 9.5 (L) 11.7 - 15.4 g/dL    HCT 29.1 (L) 35.8 - 46.3 %   HGB & HCT    Collection Time: 12/25/17  8:50 AM   Result Value Ref Range    HGB 9.5 (L) 11.7 - 15.4 g/dL    HCT 29.6 (L) 35.8 - 46.3 %       Assessment:     Principal Problem:    Upper GI bleed (12/22/2017)    Active Problems:    Melena (12/22/2017)      Fall (12/22/2017)      Acute blood loss anemia (12/22/2017)        Plan:     UGI bleed. Patient received 2 U PRBCs on 12-22-17 and ???1 U??? On 12-24-17. Her Hgb for the past 2 days waxes and wanes, so unclear how much of this is lab precision problem of actual trend but there is a discrepancy since if her Hgb was truly 7.7, I would not expect one U to bump the Hgb to 9.5.   So I suspect the 7.7 was not real.  Her history of black BMs are not being documented by nursing. If her repeat Hgb (just drawn) is still in the 9 range, then I feel she can go home today from GI point of view. If her Hgb \"drops\", then make her NPO and we will schedule another EGD tomorrow 12-26-17.

## 2017-12-25 NOTE — CONSULTS
H&P/Consult Note/Progress Note/Office Note:   Adonis Lutz  MRN: [id number]  DPM:1/38/9817  Age:82 y.o.    HPI: Adonis Lutz is a 80 y.o. female who we were consulted to see regarding acute, severe, sudden -onset, non-radiating abd pain located over an incarcerated ventral hernia. Nothing seemed to make the pain better or worse. She associated the pain with the sudden development of a palpable mass which appeared on 12/25/17. She had no associated nausea or vomiting. She was originally admitted 12/22/17 with acute blood loss anemia and melena. She was found in a pool of black tarry stool. WBC was elevated at 15.5, and hgb was noted at 7.5. BUN was also elevated at 50. She was placed on Protonix IV bid and is to be transfused one unit PRBC. She was on daily aspirin at home but denied use of nsaids. She has had no prior EGD and cannot recall last colonoscopy. There were no records listed in the [facility] office for her.       12/22/17 pCXR    IMPRESSION:   No evidence of acute pulmonary disease. .     Her GI bleed was stabilized with transfusion, acid reduction, and hydration. She denied prior abd surgery although the EGD noted changes c/w prior fundoplication and her surgical hx included prior cholecystectomy, hysterectomy and that she is s/p Left Breast NL lumpectomy/axillary dissection on 1/24/16 by Dr Mili West for left breast carcinoma      12/22/17 s/p EGD (Dr Corbin Melchor) showed:  Findings:   Esophagus- Normal.  Stomach- Black liquid and clots noted. No active bleeding seen. I could not suction out enough liquid to see the greater curvature of the body of the stomach. Sutures seen in the proximal stomach and evidence for the a fundoplication which appears intact. The antrum and pylorus were normal.   Duodenum- Normal.    Therapies: None  Specimens: None  Impression:   Upper gi bleed. Old blood in stomach and not able to clear enough to see the body of the stomach well.   ? Prior fundoplication.     Recommendations:  IV PPIs. Repeat EGD if active bleeding.     12/25/17 CT abd/pelvis with oral and IV contrast  Small ventral hernia just above the umbilicus which contains fat only and no bowel loops. The fascial defect measures 15 mm and the hernia sac 47 mm. Minimal stranding densities within the fat. Dense calcifications of the mitral annulus. The lung windows demonstrate a 5 mm pleural-based pulmonary  nodule posterior right lung base. The liver and spleen appear homogeneous. The  gallbladder is absent. The biliary tree is not dilated. The pancreas is  unremarkable. No free air or free fluid. Bowel loops are not dilated. The  kidneys enhance uniformly except for a small cyst in the lateral left kidney. No  radiopaque renal calculi. No hydronephrosis. The adrenal glands are normal size. Aorta is normal caliber and calcified.     Pelvis: No free fluid or acute inflammatory changes. The urinary bladder unremarkable. Uterus is absent. No gross bony lesions.      IMPRESSION:  There is a small ventral hernia just above the umbilicus which contains fat only and no bowel loops. The fascial defect measures 15 mm and the hernia sac 47 mm. Minimal stranding densities within the fat. A 5 mm pulmonary nodule posterior right lung base.  s/p cholecystectomy, s/p hysterectomy        Past Medical History:   Diagnosis Date    Acute bronchitis     Anxiety state, unspecified     Cancer ([address] 12/13    left breast    Chest pain, unspecified     Depression     Depressive disorder, not elsewhere classified 5/13/2015    Disorder of bone and cartilage, unspecified 5/13/2015    Enthesopathy of unspecified site 5/13/2015    Essential hypertension, benign 5/13/2015    GERD (gastroesophageal reflux disease)     no longer has    Hip, thigh, leg, and ankle, blister, without mention of infection     Hypercholesterolemia     Hypertension     controlled with medication    Impaired fasting glucose 5/13/2015    Loss of height     Malignant neoplasm of breast (female), unspecified site     Mixed incontinence urge and stress (male)(female)     Other and unspecified hyperlipidemia 5/13/2015    Other unspecified back disorder     Overweight(278.02)     Psychiatric disorder     depression    Unspecified asthma(493.90)     Unspecified hemorrhoids without mention of complication 5/42/1476    Unspecified menopausal and postmenopausal disorder      Past Surgical History:   Procedure Laterality Date    ABDOMEN SURGERY PROC UNLISTED      esophagus to correct GERD unsure of procedure name    HX BREAST LUMPECTOMY  1/24/2014    LEFT BREAST NEEDLE LOCALIZED MASS EXCISION     performed by Ailyn Patel MD at [address] HX HYSTERECTOMY  1970s    HX LAP CHOLECYSTECTOMY  2001     Current Facility-Administered Medications   Medication Dose Route Frequency    dextrose 5% - 0.45% NaCl with KCl 20 mEq/L infusion  100 mL/hr IntraVENous CONTINUOUS    diatrizoate meglumine-d.sodium (MD-GASTROVIEW,GASTROGRAFIN) 66-10 % contrast solution 15 mL  15 mL Oral RAD ONCE    iopamidol (ISOVUE-370) 76 % injection 100 mL  100 mL IntraVENous RAD ONCE    sodium chloride 0.9 % bolus infusion 100 mL  100 mL IntraVENous RAD ONCE    saline peripheral flush soln 10 mL  10 mL InterCATHeter RAD ONCE    0.9% sodium chloride infusion 250 mL  250 mL IntraVENous PRN    sodium chloride (NS) flush 5-10 mL  5-10 mL IntraVENous PRN    0.9% sodium chloride infusion 250 mL  250 mL IntraVENous PRN    anastrozole (ARIMIDEX) tablet 1 mg  1 mg Oral DAILY    atorvastatin (LIPITOR) tablet 40 mg  40 mg Oral DAILY    PARoxetine (PAXIL) tablet 20 mg  20 mg Oral DAILY    amLODIPine (NORVASC) tablet 2.5 mg  2.5 mg Oral DAILY    sodium chloride (NS) flush 5-10 mL  5-10 mL IntraVENous Q8H    sodium chloride (NS) flush 5-10 mL  5-10 mL IntraVENous PRN    ondansetron (ZOFRAN) injection 4 mg  4 mg IntraVENous Q4H PRN    pantoprazole (PROTONIX) 40 mg in sodium chloride 0.9 % 10 mL injection  40 mg IntraVENous BID    phenol throat spray (CHLORASEPTIC) 1 Spray  1 Spray Oral PRN    acetaminophen (TYLENOL) tablet 650 mg  650 mg Oral Q6H PRN    famotidine (PF) (PEPCID) 20 mg in sodium chloride 0.9 % 10 mL injection  20 mg IntraVENous ONCE PRN    famotidine (PEPCID) tablet 20 mg  20 mg Oral PRN     Review of patient's allergies indicates no known allergies. Social History     Social History    Marital status:      Spouse name: N/A    Number of children: N/A    Years of education: N/A     Social History Main Topics    Smoking status: Never Smoker    Smokeless tobacco: Never Used    Alcohol use Yes      Comment: rarely    Drug use: No    Sexual activity: Not Asked     Other Topics Concern    None     Social History Narrative     History   Smoking Status    Never Smoker   Smokeless Tobacco    Never Used     Family History   Problem Relation Age of Onset    Cancer Maternal Grandmother      breast at old age   Todd Tony Lung Disease Mother     Cancer Father     Diabetes Brother     Lung Disease Brother     Alcohol abuse Brother      ROS: The patient has no difficulty with chest pain or shortness of breath. No fever or chills. Comprehensive review of systems was otherwise unremarkable except as noted above. Physical Exam:   Visit Vitals    /74    Pulse 71    Temp 97.8 °F (36.6 °C)    Resp 20    SpO2 96%     Constitutional: Alert, oriented, cooperative patient in no acute distress; appears stated age    Eyes:Sclera are clear. EOMs intact  ENMT: no external lesions gross hearing normal; no obvious neck masses, no ear or lip lesions, nares normal  CV: RRR. Normal perfusion  Resp: No JVD. Breathing is  non-labored; no audible wheezing. GI: obese; incarcerated ventral hernia reduced with supine positioning, relaxation, and pressure     Musculoskeletal: unremarkable with normal function. No embolic signs or cyanosis.    Neuro:  Oriented; moves all 4; no focal deficits  Psychiatric: normal affect and mood, no memory impairment    Recent vitals (if inpt):  Patient Vitals for the past 24 hrs:   BP Temp Pulse Resp SpO2   12/25/17 1110 149/74 97.8 °F (36.6 °C) 71 20 96 %   12/25/17 0700 159/74 98 °F (36.7 °C) 79 20 97 %   12/25/17 0424 158/65 98.7 °F (37.1 °C) 73 20 98 %   12/24/17 2326 137/69 98 °F (36.7 °C) 73 20 95 %   12/24/17 1900 146/61 97.9 °F (36.6 °C) 74 18 99 %   12/24/17 1747 136/58 98.2 °F (36.8 °C) 79 18 97 %   12/24/17 1647 121/60 98.1 °F (36.7 °C) 77 18 97 %   12/24/17 1622 133/58 98.1 °F (36.7 °C) 73 18 92 %   12/24/17 1617 121/60 98.4 °F (36.9 °C) 76 - 94 %   12/24/17 1602 143/62 98.1 °F (36.7 °C) 79 18 97 %   12/24/17 1358 148/59 98.2 °F (36.8 °C) 77 18 99 %       Labs:  Recent Labs      12/25/17   0850  12/25/17   0402   HGB  9.5*  9.5*   NA   --   145   K   --   3.4*   CL   --   110*   CO2   --   25   BUN   --   7*   CREA   --   0.45*   GLU   --   116*   TBILI   --   0.7   SGOT   --   51*   ALT   --   35   AP   --   73       Lab Results   Component Value Date/Time    WBC 15.5 12/22/2017 04:15 AM    HGB 9.5 12/25/2017 08:50 AM    PLATELET 980 21/65/0196 04:15 AM    Sodium 145 12/25/2017 04:02 AM    Potassium 3.4 12/25/2017 04:02 AM    Chloride 110 12/25/2017 04:02 AM    CO2 25 12/25/2017 04:02 AM    BUN 7 12/25/2017 04:02 AM    Creatinine 0.45 12/25/2017 04:02 AM    Glucose 116 12/25/2017 04:02 AM    INR 1.0 01/17/2014 11:37 AM    aPTT 26.7 01/17/2014 11:37 AM    Bilirubin, total 0.7 12/25/2017 04:02 AM    AST (SGOT) 51 12/25/2017 04:02 AM    ALT (SGPT) 35 12/25/2017 04:02 AM    Alk. phosphatase 73 12/25/2017 04:02 AM    Lipase 201 12/22/2017 04:15 AM       I reviewed recent labs and recent radiologic studies. I independently reviewed radiology images for studies I described above or studies I have ordered.    Admission date (for inpatients): 12/22/2017   3 Days Post-Op  Procedure(s):  ESOPHAGOGASTRODUODENOSCOPY (EGD)/625    ASSESSMENT/PLAN:  Problem List  Date Reviewed: 12/22/2017          Codes Class Noted    Epigastric pain ICD-10-CM: R10.13  ICD-9-CM: 789.06  12/25/2017        Incarcerated incisional hernia ICD-10-CM: K43.0  ICD-9-CM: 552.21  12/25/2017        Melena ICD-10-CM: K92.1  ICD-9-CM: 578.1  12/22/2017        * (Principal)Upper GI bleed ICD-10-CM: K92.2  ICD-9-CM: 578.9  12/22/2017        Fall ICD-10-CM: D96. [place]  ICD-9-CM: E888.9  12/22/2017        Acute blood loss anemia ICD-10-CM: D62  ICD-9-CM: 285.1  12/22/2017        Hyperglycemia ICD-10-CM: R73.9  ICD-9-CM: 790.29  4/4/2017        Pulmonary nodules ICD-10-CM: R91.8  ICD-9-CM: 793.19  11/8/2016        Osteoporosis ICD-10-CM: M81.0  ICD-9-CM: 733.00  3/3/2016        Fatigue ICD-10-CM: R53.83  ICD-9-CM: 780.79  2/19/2016        Back pain ICD-10-CM: M54.9  ICD-9-CM: 724.5  2/2/2016        Depressive disorder, not elsewhere classified ICD-10-CM: F32.9  ICD-9-CM: 448  5/13/2015        Unspecified hemorrhoids without mention of complication [id number]: F35.8  ICD-9-CM: 455.6  5/13/2015        Hyperlipidemia ICD-10-CM: E78.5  ICD-9-CM: 272.4  5/13/2015        Essential hypertension, benign ICD-10-CM: I10  ICD-9-CM: 401.1  5/13/2015        Disorder of bone and cartilage, unspecified ICD-10-CM: M89.9, M94.9  ICD-9-CM: 733.90  5/13/2015        Impaired fasting glucose ICD-10-CM: R73.01  ICD-9-CM: 790.21  5/13/2015        Enthesopathy of unspecified site ICD-10-CM: M77.9  ICD-9-CM: 726.90  5/13/2015        Breast cancer, left ([address] ICD-10-CM: C50.912  ICD-9-CM: 174.9  1/10/2014            Principal Problem:    Upper GI bleed (12/22/2017)    Active Problems:    Melena (12/22/2017)      Fall (12/22/2017)      Acute blood loss anemia (12/22/2017)      Epigastric pain (12/25/2017)      Incarcerated incisional hernia (12/25/2017)       Incarcerated Ventral Incisional hernia  I reduced the incarcerated ventral incisional hernia located just above the umbilicus close to the midline.   CT scan to evaluate further   Keep NPO for now    Further plans pending CT images    Acute Blood loss anemia  Transfuse as needed    UGI Bleed  Proton pump Inhibitor    Abd pain  Narcotic pain meds PRN    5mm Right lung nodule on CT scan 12/25/17  Recommend follow-up with LMD with f/u imaging      Signed:  Laura Reich MD,  FACS

## 2017-12-26 VITALS
RESPIRATION RATE: 18 BRPM | HEART RATE: 80 BPM | SYSTOLIC BLOOD PRESSURE: 165 MMHG | DIASTOLIC BLOOD PRESSURE: 80 MMHG | TEMPERATURE: 98.6 F | OXYGEN SATURATION: 96 %

## 2017-12-26 LAB
ABO + RH BLD: NORMAL
BLD PROD TYP BPU: NORMAL
BLOOD BANK CMNT PATIENT-IMP: NORMAL
BLOOD GROUP ANTIBODIES SERPL: NORMAL
BPU ID: NORMAL
CROSSMATCH RESULT,%XM: NORMAL
HCT VFR BLD AUTO: 28.3 % (ref 35.8–46.3)
HCT VFR BLD AUTO: 31.4 % (ref 35.8–46.3)
HGB BLD-MCNC: 10.3 G/DL (ref 11.7–15.4)
HGB BLD-MCNC: 9.4 G/DL (ref 11.7–15.4)
SPECIMEN EXP DATE BLD: NORMAL
STATUS OF UNIT,%ST: NORMAL
UNIT DIVISION, %UDIV: 0

## 2017-12-26 PROCEDURE — 74011000250 HC RX REV CODE- 250: Performed by: HOSPITALIST

## 2017-12-26 PROCEDURE — C9113 INJ PANTOPRAZOLE SODIUM, VIA: HCPCS | Performed by: HOSPITALIST

## 2017-12-26 PROCEDURE — 85018 HEMOGLOBIN: CPT | Performed by: INTERNAL MEDICINE

## 2017-12-26 PROCEDURE — 74011250637 HC RX REV CODE- 250/637: Performed by: HOSPITALIST

## 2017-12-26 PROCEDURE — 36415 COLL VENOUS BLD VENIPUNCTURE: CPT | Performed by: INTERNAL MEDICINE

## 2017-12-26 PROCEDURE — 74011250636 HC RX REV CODE- 250/636: Performed by: HOSPITALIST

## 2017-12-26 PROCEDURE — 97161 PT EVAL LOW COMPLEX 20 MIN: CPT

## 2017-12-26 PROCEDURE — 74011250637 HC RX REV CODE- 250/637: Performed by: INTERNAL MEDICINE

## 2017-12-26 RX ORDER — POTASSIUM CHLORIDE 20 MEQ/1
40 TABLET, EXTENDED RELEASE ORAL
Status: COMPLETED | OUTPATIENT
Start: 2017-12-26 | End: 2017-12-26

## 2017-12-26 RX ORDER — PANTOPRAZOLE SODIUM 40 MG/1
40 TABLET, DELAYED RELEASE ORAL
Qty: 60 TAB | Refills: 1 | Status: SHIPPED | OUTPATIENT
Start: 2017-12-26 | End: 2019-06-19 | Stop reason: ALTCHOICE

## 2017-12-26 RX ADMIN — AMLODIPINE BESYLATE 2.5 MG: 5 TABLET ORAL at 08:28

## 2017-12-26 RX ADMIN — ANASTROZOLE 1 MG: 1 TABLET, COATED ORAL at 08:19

## 2017-12-26 RX ADMIN — PAROXETINE HYDROCHLORIDE 20 MG: 20 TABLET, FILM COATED ORAL at 08:28

## 2017-12-26 RX ADMIN — POTASSIUM CHLORIDE 40 MEQ: 20 TABLET, EXTENDED RELEASE ORAL at 12:11

## 2017-12-26 RX ADMIN — ATORVASTATIN CALCIUM 40 MG: 40 TABLET, FILM COATED ORAL at 08:28

## 2017-12-26 RX ADMIN — SODIUM CHLORIDE 40 MG: 9 INJECTION INTRAMUSCULAR; INTRAVENOUS; SUBCUTANEOUS at 08:28

## 2017-12-26 NOTE — PROGRESS NOTES
H&P/Consult Note/Progress Note/Office Note:   Braden Way  MRN: [id number]  [facility]:[birth year]  Age:82 y.o.    HPI: Braden Way is a 80 y.o. female who we were consulted to see regarding acute, severe, sudden -onset, non-radiating abd pain located over an incarcerated ventral hernia. Nothing seemed to make the pain better or worse. She associated the pain with the sudden development of a palpable mass which appeared on 12/25/17. She had no associated nausea or vomiting. She was originally admitted 12/22/17 with acute blood loss anemia and melena. She was found in a pool of black tarry stool. WBC was elevated at 15.5, and hgb was noted at 7.5. BUN was also elevated at 50. She was placed on Protonix IV bid and is to be transfused one unit PRBC. She was on daily aspirin at home but denied use of nsaids. She has had no prior EGD and cannot recall last colonoscopy. There were no records listed in the [facility] office for her.       12/22/17 pCXR    IMPRESSION:   No evidence of acute pulmonary disease. .     Her GI bleed was stabilized with transfusion, acid reduction, and hydration. She denied prior abd surgery although the EGD noted changes c/w prior fundoplication and her surgical hx included prior cholecystectomy, hysterectomy and that she is s/p Left Breast NL lumpectomy/axillary dissection on 1/24/16 by Dr Traci Ny for left breast carcinoma      12/22/17 s/p EGD (Dr Carlos Alberto Rao) showed:  Findings:   Esophagus- Normal.  Stomach- Black liquid and clots noted. No active bleeding seen. I could not suction out enough liquid to see the greater curvature of the body of the stomach. Sutures seen in the proximal stomach and evidence for the a fundoplication which appears intact. The antrum and pylorus were normal.   Duodenum- Normal.    Therapies: None  Specimens: None  Impression:   Upper gi bleed. Old blood in stomach and not able to clear enough to see the body of the stomach well.   ? Prior fundoplication.     Recommendations:  IV PPIs. Repeat EGD if active bleeding.     12/25/17 CT abd/pelvis with oral and IV contrast  Small ventral hernia just above the umbilicus which contains fat only and no bowel loops. The fascial defect measures 15 mm and the hernia sac 47 mm. Minimal stranding densities within the fat. Dense calcifications of the mitral annulus. The lung windows demonstrate a 5 mm pleural-based pulmonary  nodule posterior right lung base. The liver and spleen appear homogeneous. The  gallbladder is absent. The biliary tree is not dilated. The pancreas is  unremarkable. No free air or free fluid. Bowel loops are not dilated. The  kidneys enhance uniformly except for a small cyst in the lateral left kidney. No  radiopaque renal calculi. No hydronephrosis. The adrenal glands are normal size. Aorta is normal caliber and calcified.     Pelvis: No free fluid or acute inflammatory changes. The urinary bladder unremarkable. Uterus is absent. No gross bony lesions.      IMPRESSION:  There is a small ventral hernia just above the umbilicus which contains fat only and no bowel loops. The fascial defect measures 15 mm and the hernia sac 47 mm. Minimal stranding densities within the fat. A 5 mm pulmonary nodule posterior right lung base.  s/p cholecystectomy, s/p hysterectomy    12/26/17 no abd pain over hernia site this am    Past Medical History:   Diagnosis Date    Acute bronchitis     Anxiety state, unspecified     Cancer ([facility] [address] 12/13    left breast    Chest pain, unspecified     Depression     Depressive disorder, not elsewhere classified 5/13/2015    Disorder of bone and cartilage, unspecified 5/13/2015    Enthesopathy of unspecified site 5/13/2015    Essential hypertension, benign 5/13/2015    GERD (gastroesophageal reflux disease)     no longer has    Hip, thigh, leg, and ankle, blister, without mention of infection     Hypercholesterolemia     Hypertension     controlled with medication    Impaired fasting glucose 5/13/2015    Loss of height     Malignant neoplasm of breast (female), unspecified site     Mixed incontinence urge and stress (male)(female)     Other and unspecified hyperlipidemia 5/13/2015    Other unspecified back disorder     Overweight(278.02)     Psychiatric disorder     depression    Unspecified asthma(493.90)     Unspecified hemorrhoids without mention of complication 9/63/0404    Unspecified menopausal and postmenopausal disorder      Past Surgical History:   Procedure Laterality Date    ABDOMEN SURGERY PROC UNLISTED      esophagus to correct GERD unsure of procedure name    HX BREAST LUMPECTOMY  1/24/2014    LEFT BREAST NEEDLE LOCALIZED MASS EXCISION     performed by Mary Browning MD at [address] HX HYSTERECTOMY  1970s    HX LAP CHOLECYSTECTOMY  2001     Current Facility-Administered Medications   Medication Dose Route Frequency    dextrose 5 % - 0.45% NaCl 1,000 mL with potassium chloride 20 mEq infusion   IntraVENous CONTINUOUS    0.9% sodium chloride infusion 250 mL  250 mL IntraVENous PRN    sodium chloride (NS) flush 5-10 mL  5-10 mL IntraVENous PRN    0.9% sodium chloride infusion 250 mL  250 mL IntraVENous PRN    anastrozole (ARIMIDEX) tablet 1 mg  1 mg Oral DAILY    atorvastatin (LIPITOR) tablet 40 mg  40 mg Oral DAILY    PARoxetine (PAXIL) tablet 20 mg  20 mg Oral DAILY    amLODIPine (NORVASC) tablet 2.5 mg  2.5 mg Oral DAILY    sodium chloride (NS) flush 5-10 mL  5-10 mL IntraVENous Q8H    sodium chloride (NS) flush 5-10 mL  5-10 mL IntraVENous PRN    ondansetron (ZOFRAN) injection 4 mg  4 mg IntraVENous Q4H PRN    pantoprazole (PROTONIX) 40 mg in sodium chloride 0.9 % 10 mL injection  40 mg IntraVENous BID    phenol throat spray (CHLORASEPTIC) 1 Spray  1 Spray Oral PRN    acetaminophen (TYLENOL) tablet 650 mg  650 mg Oral Q6H PRN    famotidine (PF) (PEPCID) 20 mg in sodium chloride 0.9 % 10 mL injection  20 mg IntraVENous ONCE PRN    famotidine (PEPCID) tablet 20 mg  20 mg Oral PRN     Review of patient's allergies indicates no known allergies. Social History     Social History    Marital status:      Spouse name: N/A    Number of children: N/A    Years of education: N/A     Social History Main Topics    Smoking status: Never Smoker    Smokeless tobacco: Never Used    Alcohol use Yes      Comment: rarely    Drug use: No    Sexual activity: Not Asked     Other Topics Concern    None     Social History Narrative     History   Smoking Status    Never Smoker   Smokeless Tobacco    Never Used     Family History   Problem Relation Age of Onset    Cancer Maternal Grandmother      breast at old age   [employer] Lung Disease Mother     Cancer Father     Diabetes Brother     Lung Disease Brother     Alcohol abuse Brother      ROS: The patient has no difficulty with chest pain or shortness of breath. No fever or chills. Comprehensive review of systems was otherwise unremarkable except as noted above. Physical Exam:   Visit Vitals    /80    Pulse 80    Temp 98.6 °F (37 °C)    Resp 18    SpO2 96%     Constitutional: Alert, oriented, cooperative patient in no acute distress; appears stated age    Eyes:Sclera are clear. EOMs intact  ENMT: no external lesions gross hearing normal; no obvious neck masses, no ear or lip lesions, nares normal  CV: RRR. Normal perfusion  Resp: No JVD. Breathing is  non-labored; no audible wheezing. GI: obese; incarcerated ventral hernia reduced on 12/25/17; soft/NT/ND     Musculoskeletal: unremarkable with normal function. No embolic signs or cyanosis.    Neuro:  Oriented; moves all 4; no focal deficits  Psychiatric: normal affect and mood, no memory impairment    Recent vitals (if inpt):  Patient Vitals for the past 24 hrs:   BP Temp Pulse Resp SpO2   12/26/17 0650 165/80 98.6 °F (37 °C) 80 18 96 %   12/26/17 0441 160/84 98.6 °F (37 °C) 71 18 96 %   12/26/17 0122 158/72 98.3 °F (36.8 °C) 78 18 96 %   12/25/17 2002 150/65 98.1 °F (36.7 °C) 81 18 98 %   12/25/17 1502 151/72 98.5 °F (36.9 °C) 74 20 98 %   12/25/17 1110 149/74 97.8 °F (36.6 °C) 71 20 96 %       Labs:  Recent Labs      12/26/17   0238   12/25/17   0402   HGB  9.4*   < >  9.5*   NA   --    --   145   K   --    --   3.4*   CL   --    --   110*   CO2   --    --   25   BUN   --    --   7*   CREA   --    --   0.45*   GLU   --    --   116*   TBILI   --    --   0.7   SGOT   --    --   51*   ALT   --    --   35   AP   --    --   73    < > = values in this interval not displayed. Lab Results   Component Value Date/Time    WBC 15.5 12/22/2017 04:15 AM    HGB 9.4 12/26/2017 02:38 AM    PLATELET 142 81/24/2545 04:15 AM    Sodium 145 12/25/2017 04:02 AM    Potassium 3.4 12/25/2017 04:02 AM    Chloride 110 12/25/2017 04:02 AM    CO2 25 12/25/2017 04:02 AM    BUN 7 12/25/2017 04:02 AM    Creatinine 0.45 12/25/2017 04:02 AM    Glucose 116 12/25/2017 04:02 AM    INR 1.0 01/17/2014 11:37 AM    aPTT 26.7 01/17/2014 11:37 AM    Bilirubin, total 0.7 12/25/2017 04:02 AM    AST (SGOT) 51 12/25/2017 04:02 AM    ALT (SGPT) 35 12/25/2017 04:02 AM    Alk. phosphatase 73 12/25/2017 04:02 AM    Lipase 201 12/22/2017 04:15 AM       I reviewed recent labs and recent radiologic studies. I independently reviewed radiology images for studies I described above or studies I have ordered. Admission date (for inpatients): 12/22/2017   3 Days Post-Op  Procedure(s):  ESOPHAGOGASTRODUODENOSCOPY (EGD)/625    ASSESSMENT/PLAN:  Problem List  Date Reviewed: 12/22/2017          Codes Class Noted    Epigastric pain ICD-10-CM: R10.13  ICD-9-CM: 789.06  12/25/2017        Incarcerated incisional hernia ICD-10-CM: K43.0  ICD-9-CM: 552.21  12/25/2017        Melena ICD-10-CM: K92.1  ICD-9-CM: 578.1  12/22/2017        * (Principal)Upper GI bleed ICD-10-CM: K92.2  ICD-9-CM: 578.9  12/22/2017        Fall ICD-10-CM: M77. Daune Koyanagi  ICD-9-CM: J816.2  12/22/2017        Acute blood loss anemia ICD-10-CM: D62  ICD-9-CM: 285.1  12/22/2017        Hyperglycemia ICD-10-CM: R73.9  ICD-9-CM: 790.29  4/4/2017        Pulmonary nodules ICD-10-CM: R91.8  ICD-9-CM: 793.19  11/8/2016        Osteoporosis ICD-10-CM: M81.0  ICD-9-CM: 733.00  3/3/2016        Fatigue ICD-10-CM: R53.83  ICD-9-CM: 780.79  2/19/2016        Back pain ICD-10-CM: M54.9  ICD-9-CM: 724.5  2/2/2016        Depressive disorder, not elsewhere classified ICD-10-CM: F32.9  ICD-9-CM: 880  5/13/2015        Unspecified hemorrhoids without mention of complication EFJ-06-TA: O04.4  ICD-9-CM: 455.6  5/13/2015        Hyperlipidemia ICD-10-CM: E78.5  ICD-9-CM: 272.4  5/13/2015        Essential hypertension, benign ICD-10-CM: I10  ICD-9-CM: 401.1  5/13/2015        Disorder of bone and cartilage, unspecified ICD-10-CM: M89.9, M94.9  ICD-9-CM: 733.90  5/13/2015        Impaired fasting glucose ICD-10-CM: R73.01  ICD-9-CM: 790.21  5/13/2015        Enthesopathy of unspecified site ICD-10-CM: M77.9  ICD-9-CM: 726.90  5/13/2015        Breast cancer, left ([facility] [address] ICD-10-CM: C50.912  ICD-9-CM: 174.9  1/10/2014            Principal Problem:    Upper GI bleed (12/22/2017)    Active Problems:    Melena (12/22/2017)      Fall (12/22/2017)      Acute blood loss anemia (12/22/2017)      Epigastric pain (12/25/2017)      Incarcerated incisional hernia (12/25/2017)       Incarcerated Ventral Incisional hernia  I reduced the incarcerated ventral incisional hernia located just above the umbilicus close to the midline on 12/25/17. It remained reduced on 12/26/17.   CT scan as above  Home today    Outpt hernia repair through my office  Follow-up instructions written in [facility] dc instructions section    Acute Blood loss anemia  Transfuse as needed    UGI Bleed  Proton pump Inhibitor  GI follow-up    Abd pain  Narcotic pain meds PRN    5mm Right lung nodule on CT scan 12/25/17  Recommend follow-up with LMD with f/u imaging      Signed:  Gil Pastrana MD, FACS

## 2017-12-26 NOTE — PROGRESS NOTES
Rounded every hour and prn. Ambulated to BR and back without assist. Was made NPO and reports understanding and intended compliance. Denied pain. Denied n/v. Reported a BM on return from CT but was not witnessed. RN placed TWO commode catchers in toilet with the reinforcement of staff seeing outputs. It is not believed pt had been intentionally noncompliant and can be forgetful with some slight confusion that appears to have gotten better. Reported to PM RN.

## 2017-12-26 NOTE — PROGRESS NOTES
Hourly rounds complete this shift, pt had 2 small black tarry stools during the night, no new complaints at this time.

## 2017-12-26 NOTE — PROGRESS NOTES
GI DAILY PROGRESS SIGN-OFF NOTE    Admit Date: 12/22/2017    CC: UGI bleed    Subjective:     Patient doing well this morning. Reports passing small amounts of dark stools that appeared to be old blood x2 last night. Hgb stable in the 9s. No abdominal pain. Medications:  Current Facility-Administered Medications   Medication Dose Route Frequency    dextrose 5 % - 0.45% NaCl 1,000 mL with potassium chloride 20 mEq infusion   IntraVENous CONTINUOUS    0.9% sodium chloride infusion 250 mL  250 mL IntraVENous PRN    sodium chloride (NS) flush 5-10 mL  5-10 mL IntraVENous PRN    0.9% sodium chloride infusion 250 mL  250 mL IntraVENous PRN    anastrozole (ARIMIDEX) tablet 1 mg  1 mg Oral DAILY    atorvastatin (LIPITOR) tablet 40 mg  40 mg Oral DAILY    PARoxetine (PAXIL) tablet 20 mg  20 mg Oral DAILY    amLODIPine (NORVASC) tablet 2.5 mg  2.5 mg Oral DAILY    sodium chloride (NS) flush 5-10 mL  5-10 mL IntraVENous Q8H    sodium chloride (NS) flush 5-10 mL  5-10 mL IntraVENous PRN    ondansetron (ZOFRAN) injection 4 mg  4 mg IntraVENous Q4H PRN    pantoprazole (PROTONIX) 40 mg in sodium chloride 0.9 % 10 mL injection  40 mg IntraVENous BID    phenol throat spray (CHLORASEPTIC) 1 Spray  1 Spray Oral PRN    acetaminophen (TYLENOL) tablet 650 mg  650 mg Oral Q6H PRN    famotidine (PF) (PEPCID) 20 mg in sodium chloride 0.9 % 10 mL injection  20 mg IntraVENous ONCE PRN    famotidine (PEPCID) tablet 20 mg  20 mg Oral PRN       Objective:   Vitals:  Visit Vitals    /80    Pulse 80    Temp 98.6 °F (37 °C)    Resp 18    SpO2 96%       Intake/Output:     12/24 1901 - 12/26 0700  In: 56 [P.O.:940]  Out: -     Exam:  A&O NAD  WDWN Elderly female  CTA   RRR  ABD: soft, NT/ND with NABS.      Data Review (Labs):    Recent Results (from the past 24 hour(s))   HGB & HCT    Collection Time: 12/25/17  5:23 PM   Result Value Ref Range    HGB 9.1 (L) 11.7 - 15.4 g/dL    HCT 27.6 (L) 35.8 - 46.3 %   HGB & HCT    Collection Time: 12/26/17  2:38 AM   Result Value Ref Range    HGB 9.4 (L) 11.7 - 15.4 g/dL    HCT 28.3 (L) 35.8 - 46.3 %       Assessment:     Principal Problem:  Upper GI bleed (12/22/2017)    Active Problems:  Melena (12/22/2017)  Fall (12/22/2017)  Acute blood loss anemia (12/22/2017)  Epigastric pain (12/25/2017)  Incarcerated incisional hernia (12/25/2017) - reduced on 12/26/17.    12/22/17 s/p EGD (Dr Sondra Ny) showed:  Findings:   Esophagus- Normal.  Stomach- Black liquid and clots noted.  No active bleeding seen. I could not suction out enough liquid to see the greater curvature of the body of the stomach.  Sutures seen in the proximal stomach and evidence for the a fundoplication which appears intact.  The antrum and pylorus were normal.   Duodenum- Normal.     Therapies: None  Specimens: None  Impression:   Upper gi bleed. Old blood in stomach and not able to clear enough to see the body of the stomach well.  ? Prior fundoplication. 12/25/17 CT abd/pelvis with oral and IV contrast  Small ventral hernia just above the umbilicus which contains fat only and no bowel loops. The fascial defect measures 15 mm and the hernia sac 47 mm. Minimal stranding densities within the fat. Dense calcifications of the mitral annulus. The lung windows demonstrate a 5 mm pleural-based pulmonary  nodule posterior right lung base. The liver and spleen appear homogeneous. The  gallbladder is absent. The biliary tree is not dilated. The pancreas is  unremarkable. No free air or free fluid. Bowel loops are not dilated. The  kidneys enhance uniformly except for a small cyst in the lateral left kidney. No  radiopaque renal calculi. No hydronephrosis. The adrenal glands are normal size. Aorta is normal caliber and calcified.      Pelvis: No free fluid or acute inflammatory changes. The urinary bladder unremarkable.    Uterus is absent.  No gross bony lesions.       IMPRESSION: Britany Khan is a small ventral hernia just above the umbilicus which contains fat only and no bowel loops. The fascial defect measures 15 mm and the hernia sac 47 mm. Minimal stranding densities within the fat.    A 5 mm pulmonary nodule posterior right lung base. s/p cholecystectomy, s/p hysterectomy    Plan:     UGI bleed. Passed 2 small dark stools overnight, but Hgb remains stable in the 9s. Home today. Will arrange outpatient hospital FU. Continue PPI po bid x1 month, then every day thereafter. Avoid NSAIDs. Pls call w/ any questions. Atilio Mcmahon PA-C    Agree with above.     Juan Eaton MD

## 2017-12-26 NOTE — PROGRESS NOTES
Problem: Mobility Impaired (Adult and Pediatric)  Goal: *Acute Goals and Plan of Care (Insert Text)  Discharge Goals:  (1.)Ms. Diana Au will move from supine to sit and sit to supine , scoot up and down and roll side to side with INDEPENDENT within 7 day(s). (2.)Ms. Diana Au will transfer from bed to chair and chair to bed with INDEPENDENT using the least restrictive device within 7 day(s). (3.)Ms. Diana Au will ambulate with SUPERVISION for 250+ feet with the least restrictive device within 7 day(s). ________________________________________________________________________________________________      PHYSICAL THERAPY: Initial Assessment, Treatment Day: Day of Assessment, AM 12/26/2017  INPATIENT: Hospital Day: 5  Payor: Jakob Lyons / Plan: [address] [place] [address] HMO / Product Type: [employer] Medicare /      NAME/AGE/GENDER: Lion Quan is a 80 y.o. female   PRIMARY DIAGNOSIS: Anemia, unspecified type [D64.9]  Melena [K92.1]  Upper GI bleed [K92.2] Upper GI bleed Upper GI bleed  Procedure(s) (LRB):  ESOPHAGOGASTRODUODENOSCOPY (EGD)/625 (N/A)  4 Days Post-Op  ICD-10: Treatment Diagnosis:   · Generalized Muscle Weakness (M62.81)  · Difficulty in walking, Not elsewhere classified (R26.2)  · History of falling (Z91.81)   Precaution/Allergies:  Review of patient's allergies indicates no known allergies. ASSESSMENT:     Ms. Diana Au is sitting up in bedside chair upon contact and agreeable to PT evaluation this morning. Pt is A&O X 4 and reports 0/10 pain. Pt reports living alone in 1 story home with 6 steps to enter with B railing and also a wheelchair ramp. Pt reports independence with gait and ADLs, 1 recent falls, and drives. Pt is SBA-supervision with sit to stand. Pt requested to ambulate in room only today. Pt ambulated 60 ft in room with HHA. Pt returned to bedside chair and left sitting up with all needs met and within reach.  Pt reports having family that live nearby that are able to assist her with any needs. Pt is safe for return home at discharge from acute setting from PT stand point. Rachael Montesinos will benefit from skilled PT (medically necessary) to address decreased strength, decreased functional tolerance, decreased cardiopulmonary endurance affecting participation in basic ADLs and functional tasks. This section established at most recent assessment   PROBLEM LIST (Impairments causing functional limitations):  1. Decreased Strength  2. Decreased ADL/Functional Activities  3. Decreased Transfer Abilities  4. Decreased Ambulation Ability/Technique  5. Decreased Balance  6. Decreased Activity Tolerance  7. Decreased Pacing Skills  8. Increased Fatigue  9. Decreased [place] with Home Exercise Program   INTERVENTIONS PLANNED: (Benefits and precautions of physical therapy have been discussed with the patient.)  1. Balance Exercise  2. Bed Mobility  3. Family Education  4. Gait Training  5. Home Exercise Program (HEP)  6. Neuromuscular Re-education/Strengthening  7. Therapeutic Activites  8. Therapeutic Exercise/Strengthening  9. Transfer Training  10. Group Therapy     TREATMENT PLAN: Frequency/Duration: 2-3 times a week for duration of hospital stay  Rehabilitation Potential For Stated Goals: Good     RECOMMENDED REHABILITATION/EQUIPMENT: (at time of discharge pending progress): Due to the probability of continued deficits (see above) this patient will not likely need continued skilled physical therapy after discharge. Equipment:    None at this time              HISTORY:   History of Present Injury/Illness (Reason for Referral):  See H&P below  Patient is 80years old female brought in to ER via EMS after she signaled her life alert at home following a mechanical fall. Pt was found in a pool of black tarry stool. Pt reported no head trauma, LOC, seizure like activity. Pt reports feeling lethargic and exhausted for past couple of weeks, didn't notice any dark/tarry stool until today.  She denies any chest pain, abdominal pain, diarrhea, excessive NSAID's use, lightheadedness/dizziness, fever, chills, night sweats. She does complain of dry cough and sore throat for last few days. In ER, she had a small BM which was black/tarry, also noted to have dry heaves. Hb 7.5, WBC 15k (no leftward shift). Pmhx significant for HTN, dyslipidemia, breast cancer, GERD, obesity, depression. Past Medical History/Comorbidities:   Ms. Nikolai Hagen  has a past medical history of Acute bronchitis; Anxiety state, unspecified; Cancer ([address] (12/13); Chest pain, unspecified; Depression; Depressive disorder, not elsewhere classified (5/13/2015); Disorder of bone and cartilage, unspecified (5/13/2015); Enthesopathy of unspecified site (5/13/2015); Essential hypertension, benign (5/13/2015); GERD (gastroesophageal reflux disease); Hip, thigh, leg, and ankle, blister, without mention of infection; Hypercholesterolemia; Hypertension; Impaired fasting glucose (5/13/2015); Loss of height; Malignant neoplasm of breast (female), unspecified site; Mixed incontinence urge and stress (male)(female); Other and unspecified hyperlipidemia (5/13/2015); Other unspecified back disorder; Overweight(278.02); Psychiatric disorder; Unspecified asthma(493.90); Unspecified hemorrhoids without mention of complication (0/03/0677); and Unspecified menopausal and postmenopausal disorder. She also has no past medical history of Aneurysm ([address] Coagulation disorder ([address] Difficult intubation; Heart failure ([address] Malignant hyperthermia due to anesthesia; Morbid obesity ([address] Nausea & vomiting; Other ill-defined conditions(799.89); Pseudocholinesterase deficiency; Unspecified adverse effect of anesthesia; or Unspecified sleep apnea. Ms. Nikolai Hagen  has a past surgical history that includes hx lap cholecystectomy (2001); pr abdomen surgery proc unlisted; hx hysterectomy (1970s); and hx breast lumpectomy (1/24/2014).   Social History/Living Environment:   Home Environment: Private residence  # Steps to Enter: 6  Rails to Enter: Yes  Hand Rails : Bilateral  Wheelchair Ramp: Yes  One/Two Story Residence: One story  Living Alone: Yes  Support Systems: Child(marco a), Family member(s)  Patient Expects to be Discharged to[de-identified] Private residence  Current DME Used/Available at Home: Shower chair  Tub or Shower Type: Shower  Prior Level of Function/Work/Activity:  Lives alone, family members live down the street, indep with gait and ADLs, drives, 1 recent fall   Number of Personal Factors/Comorbidities that affect the Plan of Care: 3+: HIGH COMPLEXITY   EXAMINATION:   Most Recent Physical Functioning:   Gross Assessment:  AROM: Generally decreased, functional  Strength: Generally decreased, functional               Posture:     Balance:  Standing - Static: Good  Standing - Dynamic : Good Bed Mobility:     Wheelchair Mobility:     Transfers:  Sit to Stand: Stand-by asssistance;Supervision  Stand to Sit: Stand-by asssistance;Supervision  Gait:     Speed/Trang: Slow  Step Length: Left shortened;Right shortened  Distance (ft): 60 Feet (ft)  Assistive Device:  (HHA)  Ambulation - Level of Assistance: Contact guard assistance  Interventions: Safety awareness training;Verbal cues      Body Structures Involved:  1. Heart  2. Lungs  3. Bones  4. Muscles Body Functions Affected:  1. Cardio  2. Respiratory  3. Neuromusculoskeletal  4. Movement Related Activities and Participation Affected:  1. General Tasks and Demands  2. Mobility  3. Self Care  4. Domestic Life  5. Interpersonal Interactions and Relationships  6. Community, Social and [place]   Number of elements that affect the Plan of Care: 4+: HIGH COMPLEXITY   CLINICAL PRESENTATION:   Presentation: Stable and uncomplicated: LOW COMPLEXITY   CLINICAL DECISION MAKIN [facility] Mobility Inpatient Short Form  How much difficulty does the patient currently have. .. Unable A Lot A Little None   1.   Turning over in bed (including adjusting bedclothes, sheets and blankets)? [] 1   [] 2   [] 3   [x] 4   2. Sitting down on and standing up from a chair with arms ( e.g., wheelchair, bedside commode, etc.)   [] 1   [] 2   [] 3   [x] 4   3. Moving from lying on back to sitting on the side of the bed? [] 1   [] 2   [] 3   [x] 4   How much help from another person does the patient currently need. .. Total A Lot A Little None   4. Moving to and from a bed to a chair (including a wheelchair)? [] 1   [] 2   [] 3   [x] 4   5. Need to walk in hospital room? [] 1   [] 2   [] 3   [x] 4   6. Climbing 3-5 steps with a railing? [] 1   [] 2   [x] 3   [] 4   © 2007, Trustees of [address] [place] [address], under license to [employer]. All rights reserved      Score:  Initial: 23 Most Recent: X (Date: -- )    Interpretation of Tool:  Represents activities that are increasingly more difficult (i.e. Bed mobility, Transfers, Gait). Score 24 23 22-20 19-15 14-10 9-7 6     Modifier CH CI CJ CK CL CM CN      ? Mobility - Walking and Moving Around:     - CURRENT STATUS: CI - 1%-19% impaired, limited or restricted    - GOAL STATUS: CH - 0% impaired, limited or restricted    - D/C STATUS:  ---------------To be determined---------------  Payor: Lew Medina / Plan: [address] [place] [address] HMO / Product Type: Managed Care Medicare /      Medical Necessity:     · Patient is expected to demonstrate progress in strength, balance, coordination and functional technique to decrease assistance required with gait, transfers, and functional mobility. Reason for Services/Other Comments:  · Patient continues to require skilled intervention due to decreased strength, decreased functional tolerance, decreased cardiopulmonary endurance affecting participation in basic ADLs and functional tasks.    Use of outcome tool(s) and clinical judgement create a POC that gives a: Clear prediction of patient's progress: LOW COMPLEXITY TREATMENT:   (In addition to Assessment/Re-Assessment sessions the following treatments were rendered)   Pre-treatment Symptoms/Complaints:  None  Pain: Initial:   Pain Intensity 1: 0  Post Session:  0/10     Assessment/Reassessment only, no treatment provided today    Braces/Orthotics/Lines/Etc:   · O2: room air  Treatment/Session Assessment:    · Response to Treatment:  Pt ambulated 60 ft in room with A  · Interdisciplinary Collaboration:   o Physical Therapist  o Registered Nurse  · After treatment position/precautions:   o Up in chair  o Bed/Chair-wheels locked  o Bed in low position  o Call light within reach   · Compliance with Program/Exercises: Will assess as treatment progresses. · Recommendations/Intent for next treatment session: \"Next visit will focus on advancements to more challenging activities and reduction in assistance provided\".   Total Treatment Duration:  PT Patient Time In/Time Out  Time In: 0917  Time Out: 1501 Lori JERONIMO

## 2017-12-26 NOTE — DISCHARGE SUMMARY
Hospitalist Discharge Summary     Patient ID:  Karla Whitlock  [id number]  27 y.o.  1935  Admit date: 12/22/2017  3:55 AM  Discharge date and time: 12/26/2017  Attending: Cecilia Malone MD  PCP:  Santos Pritchard MD  Treatment Team: Attending Provider: Cecilia Malone MD; Consulting Provider: Gabino Solorzano MD; Care Manager: Luis Whitmore RN; Consulting Provider: Jasmyne Zepeda MD    Principal Diagnosis Upper GI bleed   Principal Problem:    Upper GI bleed (12/22/2017)    Active Problems:    Melena (12/22/2017)      Fall (12/22/2017)      Acute blood loss anemia (12/22/2017)      Epigastric pain (12/25/2017)      Incarcerated incisional hernia (12/25/2017)       HPI:    Patient is 80years old female brought in to ER via EMS after she signaled her life alert at home following a mechanical fall. Pt was found in a pool of black tarry stool. Pt reported no head trauma, LOC, seizure like activity. Pt reports feeling lethargic and exhausted for past couple of weeks, didn't notice any dark/tarry stool until today. She denies any chest pain, abdominal pain, diarrhea, excessive NSAID's use, lightheadedness/dizziness, fever, chills, night sweats. She does complain of dry cough and sore throat for last few days. In ER, she had a small BM which was black/tarry, also noted to have dry heaves. Hb 7.5, WBC 15k (no leftward shift). Pmhx significant for HTN, dyslipidemia, breast cancer, GERD, obesity, depression. Hospital Course:    Seen by GI and s/p 1U prbc and EGD 12-22-17 which showed black old blood in the stomach (per Dr Robert Velazco) but he couldn't see a large portion of the body of stomach; however, he did not see active bleeding. GI bleed resolved after 3U prbcs. Surgery consulted for Incarcerated Ventral abdominal wall hernia. Dr Ervin Hence manually reduced the hernia at bedside and did a CT scan thatb did not show any obstruction or trapped bowel. He recommended outpt hernia repair.  Her Hgb is stable around 9.5 and she has been cleared for discharge by GI. Will follow up with GI and surgery as scheduled by their offices. She is medically ready for discharge to home with [facility]. Plan discussed with pt/family and all are in agreement with the plan. All questions answered. Pt was stable at time of discharge. Follow up as per below. Significant Diagnostic Imaging:     CT Abdomen/Pelvis    FINDINGS:   Abdomen: There is a small ventral hernia just above the umbilicus which contains  fat only and no bowel loops. The fascial defect measures 15 mm and the hernia  sac 47 mm. Minimal stranding densities within the fat. Dense calcifications of  the mitral annulus. The lung windows demonstrate a 5 mm pleural-based pulmonary  nodule posterior right lung base. The liver and spleen appear homogeneous. The  gallbladder is absent. The biliary tree is not dilated. The pancreas is  unremarkable. No free air or free fluid. Bowel loops are not dilated. The  kidneys enhance uniformly except for a small cyst in the lateral left kidney. No  radiopaque renal calculi. No hydronephrosis. The adrenal glands are normal size. Aorta is normal caliber and calcified.     Pelvis: No free fluid or acute inflammatory changes. The urinary bladder  unremarkable. Uterus is absent. No gross bony lesions.      IMPRESSION  IMPRESSION:  There is a small ventral hernia just above the umbilicus which  contains fat only and no bowel loops. The fascial defect measures 15 mm and the  hernia sac 47 mm. Minimal stranding densities within the fat. A 5 mm pulmonary  nodule posterior right lung base. Status post cholecystectomy, status post  hysterectomy. CXR:    FINDINGS:      Lungs are underinflated. Mild bibasilar densities, likely atelectasis. No focal  consolidation, pneumothorax or pulmonary edema. No large pleural effusion. Cardiac mediastinal contour is within normal limits.      There are degenerative changes at the right glenohumeral joint.  A 12 mm  calcification inferior to the shoulder joint, likely an intra-articular loose  body.     IMPRESSION  IMPRESSION:     No evidence of acute pulmonary disease. .    Labs: Results:       Chemistry Recent Labs      12/25/17   0402  12/24/17   0650   GLU  116*  141*   NA  145  144   K  3.4*  3.5   CL  110*  108*   CO2  25  27   BUN  7*  13   CREA  0.45*  0.45*   CA  8.1*  8.3   AGAP  10  9   AP  73  57   TP  5.7*  5.7*   ALB  2.7*  2.7*   GLOB  3.0  3.0   AGRAT  0.9*  0.9*      CBC w/Diff Recent Labs      12/26/17   0859  12/26/17   0238  12/25/17   1723   HGB  10.3*  9.4*  9.1*   HCT  31.4*  28.3*  27.6*      Cardiac Enzymes No results for input(s): CPK, CKND1, LAST in the last 72 hours. No lab exists for component: CKRMB, TROIP   Coagulation No results for input(s): PTP, INR, APTT in the last 72 hours. No lab exists for component: INREXT    Last A1c Lab Results   Component Value Date/Time    Hemoglobin A1c 6.8 08/04/2017 03:20 PM      Lipid Panel No results found for: CHOL, CHOLPOCT, CHOLX, CHLST, CHOLV, 669639, HDL, LDL, LDLC, DLDLP, 043529, VLDLC, VLDL, TGLX, TRIGL, TRIGP, TGLPOCT, CHHD, CHHDX   BNP No results for input(s): BNPP in the last 72 hours. Liver Enzymes Recent Labs      12/25/17   0402   TP  5.7*   ALB  2.7*   AP  73   SGOT  51*      Thyroid Studies Lab Results   Component Value Date/Time    TSH 2.780 02/23/2016 03:29 PM            Discharge Exam:  Patient Vitals for the past 24 hrs:   Temp Pulse Resp BP SpO2   12/26/17 0650 98.6 °F (37 °C) 80 18 165/80 96 %   12/26/17 0441 98.6 °F (37 °C) 71 18 160/84 96 %   12/26/17 0122 98.3 °F (36.8 °C) 78 18 158/72 96 %   12/25/17 2002 98.1 °F (36.7 °C) 81 18 150/65 98 %   12/25/17 1502 98.5 °F (36.9 °C) 74 20 151/72 98 %     Visit Vitals    /80    Pulse 80    Temp 98.6 °F (37 °C)    Resp 18    SpO2 96%     General appearance: alert, cooperative, no distress  Lungs: CTABL  Heart: RRR, no m/r/g  Abdomen: soft, non-tender.  Bowel sounds normal. Extremities: no cyanosis. Neurologic: Grossly normal    Disposition: [facility]  Discharge Condition: stable  Patient Instructions: Regular diet  Activity as tolerated, avoid NSAIDs  Current Discharge Medication List      START taking these medications    Details   pantoprazole (PROTONIX) 40 mg tablet Take 1 Tab by mouth ACB/HS. Indications: Twice a day for one month and then once daily. Qty: 60 Tab, Refills: 1         CONTINUE these medications which have NOT CHANGED    Details   calcium-cholecalciferol, d3, (CALCIUM 600 + D) 600-125 mg-unit tab Take 1,200 Units by mouth. atorvastatin (LIPITOR) 40 mg tablet Take 1 Tab by mouth daily. Qty: 90 Tab, Refills: 3      hydroCHLOROthiazide (HYDRODIURIL) 25 mg tablet Take 1 Tab by mouth daily. Qty: 90 Tab, Refills: 3      traMADol (ULTRAM) 50 mg tablet Take 1 Tab by mouth every six (6) hours as needed for Pain. Max Daily Amount: 200 mg. Qty: 30 Tab, Refills: 3      anastrozole (ARIMIDEX) 1 mg tablet Take 1 Tab by mouth daily. Qty: 90 Tab, Refills: 3    Associated Diagnoses: Malignant neoplasm of left female breast, unspecified site of breast      PARoxetine (PAXIL) 20 mg tablet TAKE 1 TABLET EVERY DAY  Qty: 90 Tab, Refills: 3    Associated Diagnoses: Depressive disorder, not elsewhere classified      amLODIPine (NORVASC) 5 mg tablet TAKE 1 TABLET EVERY DAY  Qty: 90 Tab, Refills: 3    Associated Diagnoses: Essential hypertension, benign      potassium chloride (K-DUR, KLOR-CON) 20 mEq tablet Take 1 Tab by mouth two (2) times a day. Qty: 180 Tab, Refills: 3    Associated Diagnoses: Low blood potassium      aspirin delayed-release 81 mg tablet Take 81 mg by mouth daily. loratadine (CLARITIN) 10 mg tablet Take 10 mg by mouth daily. Associated Diagnoses: Wheezing      cholecalciferol (VITAMIN D3) 1,000 unit tablet Take  by mouth daily. FOLIC ACID/MULTIVIT-MIN/LUTEIN (CENTRUM SILVER PO) Take 1 Tab by mouth daily.              Vaccinations:   Pt screened by nursing for pneumococcal and influenza vaccination needs at discharge, will be ordered if needed and pt consented. Immunization History   Administered Date(s) Administered    Influenza Vaccine 09/17/2014    Pneumococcal Conjugate (PCV-13) 03/18/2015    Pneumococcal Polysaccharide (PPSV-23) 01/01/2001    Zoster Vaccine, Live 03/23/2010       Follow-up Information     Follow up With Details Comments Contact Info    Jasmyne Jones MD On 1/4/2018 2:45 [address] [facility]   [address]  [phone]      [facility]  office will call you wtih your follow up appointment  [phone]          Time spent in the discharge process was 35 minutes.       Signed By: Catalina Castañeda MD     December 26, 2017

## 2017-12-26 NOTE — DISCHARGE INSTRUCTIONS
Do not take NSAIDS (ibuprofen, motrin, aleve, advil, aspirin etc...)    Follow-up with Dr Karla Chau for your hernia in 1 week in the office on a Wednesday at:  [address], [address]  (Call for an appt time-->[phone]-->option 1)      DISCHARGE SUMMARY from Nurse    PATIENT INSTRUCTIONS:    After general anesthesia or intravenous sedation, for 24 hours or while taking prescription Narcotics:  · Limit your activities  · Do not drive and operate hazardous machinery  · Do not make important personal or business decisions  · Do  not drink alcoholic beverages  · If you have not urinated within 8 hours after discharge, please contact your surgeon on call. Report the following to your surgeon:  · Excessive pain, swelling, redness or odor of or around the surgical area  · Temperature over 100.5  · Nausea and vomiting lasting longer than 4 hours or if unable to take medications  · Any signs of decreased circulation or nerve impairment to extremity: change in color, persistent  numbness, tingling, coldness or increase pain  · Any questions    What to do at Home:  Recommended activity: Activity as tolerated, avoid NSAIDS (motrin, aleve, ibuprofen, Excedrin, Goody's powder)    If you experience any of the following symptoms fever, chills, new or unrelieved pain, persistent nausea or vomiting, bloody stools or emesis, please follow up with PCP. *  Please give a list of your current medications to your Primary Care Provider. *  Please update this list whenever your medications are discontinued, doses are      changed, or new medications (including over-the-counter products) are added. *  Please carry medication information at all times in case of emergency situations.     These are general instructions for a healthy lifestyle:    No smoking/ No tobacco products/ Avoid exposure to second hand smoke  Surgeon General's Warning:  Quitting smoking now greatly reduces serious risk to your health. Obesity, smoking, and sedentary lifestyle greatly increases your risk for illness    A healthy diet, regular physical exercise & weight monitoring are important for maintaining a healthy lifestyle    You may be retaining fluid if you have a history of heart failure or if you experience any of the following symptoms:  Weight gain of 3 pounds or more overnight or 5 pounds in a week, increased swelling in our hands or feet or shortness of breath while lying flat in bed. Please call your doctor as soon as you notice any of these symptoms; do not wait until your next office visit. Recognize signs and symptoms of STROKE:    F-face looks uneven    A-arms unable to move or move unevenly    S-speech slurred or non-existent    T-time-call 911 as soon as signs and symptoms begin-DO NOT go       Back to bed or wait to see if you get better-TIME IS BRAIN. Warning Signs of HEART ATTACK     Call 911 if you have these symptoms:   Chest discomfort. Most heart attacks involve discomfort in the center of the chest that lasts more than a few minutes, or that goes away and comes back. It can feel like uncomfortable pressure, squeezing, fullness, or pain.  Discomfort in other areas of the upper body. Symptoms can include pain or discomfort in one or both arms, the back, neck, jaw, or stomach.  Shortness of breath with or without chest discomfort.  Other signs may include breaking out in a cold sweat, nausea, or lightheadedness. Don't wait more than five minutes to call 911 - MINUTES MATTER! Fast action can save your life. Calling 911 is almost always the fastest way to get lifesaving treatment. Emergency Medical Services staff can begin treatment when they arrive -- up to an hour sooner than if someone gets to the hospital by car. The discharge information has been reviewed with the patient. The patient verbalized understanding.   Discharge medications reviewed with the patient and appropriate educational materials and side effects teaching were provided.   ___________________________________________________________________________________________________________________________________

## 2017-12-27 NOTE — PROGRESS NOTES
Rounded every hour and prn. Daughter in law at bedside part of the day. Ate from meal trays, denied pain, OOB to chair.

## 2018-01-03 PROBLEM — F33.9 RECURRENT DEPRESSION (HCC): Status: ACTIVE | Noted: 2018-01-03

## 2018-01-03 PROBLEM — D62 ACUTE BLOOD LOSS ANEMIA: Status: RESOLVED | Noted: 2017-12-22 | Resolved: 2018-01-03

## 2018-01-04 PROBLEM — R10.13 EPIGASTRIC PAIN: Status: RESOLVED | Noted: 2017-12-25 | Resolved: 2018-01-04

## 2018-01-09 RX ORDER — CEFAZOLIN SODIUM IN 0.9 % NACL 2 G/50 ML
2 INTRAVENOUS SOLUTION, PIGGYBACK (ML) INTRAVENOUS ONCE
Status: CANCELLED | OUTPATIENT
Start: 2018-01-30 | End: 2018-01-30

## 2018-01-21 ENCOUNTER — PATIENT OUTREACH (OUTPATIENT)
Dept: CASE MANAGEMENT | Age: 83
End: 2018-01-21

## 2018-01-21 NOTE — PROGRESS NOTES
Downtime progress note entry for [facility] Care : [address] [facility] [place] Follow up Outreach Note   Outreach type: Follow up  Patient name: Alice Cordon  : 35  MRN: [id number]    Date/Time of Outreach: 18     Reason for follow-up:   Continuation of Care   Disease specific complaints/issues: TIA       Patient progress towards goals set from last contact:   Circuits are busy. Nurse encourage try again on the week of the . Has patient attended any PCP or specialist follow-up appointments since last contact? What was outcome of appointment? When is next follow-up scheduled? Review medications. Any medication changes since last outreach? Does patient have any questions or issues related to their medications? Home health active? If yes - any issue? Progress? Referrals needed?  (SW, Diabetes education, HH, etc. )    Other issues/Miscellaneous?  (Transportation, access to meals, ability to perform ADLs, adequate caregiver support, etc.)              Next Outreach Scheduled:      Next Steps/Goals:      Care  completing call:

## 2018-01-23 ENCOUNTER — HOSPITAL ENCOUNTER (OUTPATIENT)
Dept: SURGERY | Age: 83
Discharge: HOME | End: 2018-01-23
Payer: MEDICARE

## 2018-01-23 VITALS
DIASTOLIC BLOOD PRESSURE: 69 MMHG | RESPIRATION RATE: 18 BRPM | HEART RATE: 73 BPM | WEIGHT: 189.38 LBS | SYSTOLIC BLOOD PRESSURE: 170 MMHG | OXYGEN SATURATION: 95 % | HEIGHT: 61 IN | TEMPERATURE: 97.7 F | BODY MASS INDEX: 35.75 KG/M2

## 2018-01-23 LAB
CREAT SERPL-MCNC: 0.62 MG/DL (ref 0.6–1)
HGB BLD-MCNC: 13 G/DL (ref 11.7–15.4)
POTASSIUM SERPL-SCNC: 3.9 MMOL/L (ref 3.5–5.1)

## 2018-01-23 PROCEDURE — 84132 ASSAY OF SERUM POTASSIUM: CPT | Performed by: ANESTHESIOLOGY

## 2018-01-23 PROCEDURE — 85018 HEMOGLOBIN: CPT | Performed by: ANESTHESIOLOGY

## 2018-01-23 PROCEDURE — 82565 ASSAY OF CREATININE: CPT | Performed by: ANESTHESIOLOGY

## 2018-01-23 PROCEDURE — 36415 COLL VENOUS BLD VENIPUNCTURE: CPT | Performed by: ANESTHESIOLOGY

## 2018-01-23 RX ORDER — CALCIUM CARBONATE 600 MG
1200 TABLET ORAL DAILY
COMMUNITY

## 2018-01-23 NOTE — PERIOP NOTES
Patient verified name, , and surgery as listed in [facility]. Patient provided medical/health information and PTA medications to the best of their ability. TYPE  CASE:2  Orders per surgeon: Received yesand dated yes-in CC. Labs per surgeon:none. Results: n/a  Labs per anesthesia protocol: Hgb, K+, CT. Results pending  EKG  :  Not needed    Patient provided with and instructed on education handouts including Guide to Surgery, blood transfusions, pain management, and hand hygiene for the family and community, and [facility] brochure. One bar korey mist soap and instructions given per hospital policy. Instructed patient to continue previous medications as prescribed prior to surgery unless otherwise directed and to take the following medications the day of surgery according to anesthesia guidelines : amlodipine, anastrozole, pantoprazole, paroxetine . Instructed patient to hold  the following medications: calcium, vit D, Multivitamin . Original medication prescription bottles NOT visualized during patient appointment. Patient teach back successful and patient demonstrates knowledge of instruction.

## 2018-01-23 NOTE — PERIOP NOTES
Recent Results (from the past 12 hour(s))   HEMOGLOBIN    Collection Time: 01/23/18  2:08 PM   Result Value Ref Range    HGB 13.0 11.7 - 15.4 g/dL   POTASSIUM    Collection Time: 01/23/18  2:08 PM   Result Value Ref Range    Potassium 3.9 3.5 - 5.1 mmol/L   CREATININE    Collection Time: 01/23/18  2:08 PM   Result Value Ref Range    Creatinine 0.62 0.6 - 1.0 MG/DL     Reviewed

## 2018-01-23 NOTE — PERIOP NOTES
Orders in [address] [place] [address] from [facility] - no [facility].   No orders in media

## 2018-01-29 ENCOUNTER — ANESTHESIA EVENT (OUTPATIENT)
Dept: SURGERY | Age: 83
End: 2018-01-29
Payer: MEDICARE

## 2018-01-29 NOTE — PERIOP NOTES
Printed scanned surgeon orders from media in [facility]. Faxed to pharmacy and placed on chart.

## 2018-01-30 ENCOUNTER — HOSPITAL ENCOUNTER (OUTPATIENT)
Age: 83
Setting detail: OUTPATIENT SURGERY
Discharge: HOME | End: 2018-01-30
Attending: SURGERY | Admitting: SURGERY
Payer: MEDICARE

## 2018-01-30 ENCOUNTER — ANESTHESIA (OUTPATIENT)
Dept: SURGERY | Age: 83
End: 2018-01-30
Payer: MEDICARE

## 2018-01-30 VITALS
DIASTOLIC BLOOD PRESSURE: 69 MMHG | OXYGEN SATURATION: 96 % | WEIGHT: 189.06 LBS | TEMPERATURE: 98.6 F | HEIGHT: 61 IN | RESPIRATION RATE: 16 BRPM | BODY MASS INDEX: 35.7 KG/M2 | HEART RATE: 71 BPM | SYSTOLIC BLOOD PRESSURE: 153 MMHG

## 2018-01-30 PROCEDURE — 76210000020 HC REC RM PH II FIRST 0.5 HR: Performed by: SURGERY

## 2018-01-30 PROCEDURE — C1781 MESH (IMPLANTABLE): HCPCS | Performed by: SURGERY

## 2018-01-30 PROCEDURE — 74011250636 HC RX REV CODE- 250/636

## 2018-01-30 PROCEDURE — 76060000033 HC ANESTHESIA 1 TO 1.5 HR: Performed by: SURGERY

## 2018-01-30 PROCEDURE — 77030003028 HC SUT VCRL J&J -A: Performed by: SURGERY

## 2018-01-30 PROCEDURE — 74011250636 HC RX REV CODE- 250/636: Performed by: ANESTHESIOLOGY

## 2018-01-30 PROCEDURE — 77030032490 HC SLV COMPR SCD KNE COVD -B: Performed by: SURGERY

## 2018-01-30 PROCEDURE — 76210000006 HC OR PH I REC 0.5 TO 1 HR: Performed by: SURGERY

## 2018-01-30 PROCEDURE — 74011250636 HC RX REV CODE- 250/636: Performed by: SURGERY

## 2018-01-30 PROCEDURE — 77030011640 HC PAD GRND REM COVD -A: Performed by: SURGERY

## 2018-01-30 PROCEDURE — 77030031139 HC SUT VCRL2 J&J -A: Performed by: SURGERY

## 2018-01-30 PROCEDURE — 77030018673: Performed by: SURGERY

## 2018-01-30 PROCEDURE — 77030002967 HC SUT PDS J&J -B: Performed by: SURGERY

## 2018-01-30 PROCEDURE — 77030018836 HC SOL IRR NACL ICUM -A: Performed by: SURGERY

## 2018-01-30 PROCEDURE — 74011000250 HC RX REV CODE- 250: Performed by: SURGERY

## 2018-01-30 PROCEDURE — 74011000250 HC RX REV CODE- 250

## 2018-01-30 PROCEDURE — 76010000161 HC OR TIME 1 TO 1.5 HR INTENSV-TIER 1: Performed by: SURGERY

## 2018-01-30 PROCEDURE — 77030008467 HC STPLR SKN COVD -B: Performed by: SURGERY

## 2018-01-30 PROCEDURE — 77030020782 HC GWN BAIR PAWS FLX 3M -B: Performed by: ANESTHESIOLOGY

## 2018-01-30 PROCEDURE — 77030008477 HC STYL SATN SLP COVD -A: Performed by: ANESTHESIOLOGY

## 2018-01-30 PROCEDURE — 74011250637 HC RX REV CODE- 250/637: Performed by: ANESTHESIOLOGY

## 2018-01-30 PROCEDURE — 77030008703 HC TU ET UNCUF COVD -A: Performed by: ANESTHESIOLOGY

## 2018-01-30 DEVICE — MESH HERN CIR SM 4.3CM PTFE --: Type: IMPLANTABLE DEVICE | Site: ABDOMEN | Status: FUNCTIONAL

## 2018-01-30 RX ORDER — MIDAZOLAM HYDROCHLORIDE 1 MG/ML
2 INJECTION, SOLUTION INTRAMUSCULAR; INTRAVENOUS
Status: DISCONTINUED | OUTPATIENT
Start: 2018-01-30 | End: 2018-01-30 | Stop reason: HOSPADM

## 2018-01-30 RX ORDER — SODIUM CHLORIDE, SODIUM LACTATE, POTASSIUM CHLORIDE, CALCIUM CHLORIDE 600; 310; 30; 20 MG/100ML; MG/100ML; MG/100ML; MG/100ML
75 INJECTION, SOLUTION INTRAVENOUS CONTINUOUS
Status: DISCONTINUED | OUTPATIENT
Start: 2018-01-30 | End: 2018-01-30 | Stop reason: HOSPADM

## 2018-01-30 RX ORDER — FENTANYL CITRATE 50 UG/ML
100 INJECTION, SOLUTION INTRAMUSCULAR; INTRAVENOUS ONCE
Status: DISCONTINUED | OUTPATIENT
Start: 2018-01-30 | End: 2018-01-30 | Stop reason: HOSPADM

## 2018-01-30 RX ORDER — CEFAZOLIN SODIUM/WATER 2 G/20 ML
2 SYRINGE (ML) INTRAVENOUS
Status: COMPLETED | OUTPATIENT
Start: 2018-01-30 | End: 2018-01-30

## 2018-01-30 RX ORDER — FAMOTIDINE 20 MG/1
20 TABLET, FILM COATED ORAL ONCE
Status: COMPLETED | OUTPATIENT
Start: 2018-01-30 | End: 2018-01-30

## 2018-01-30 RX ORDER — HYDROMORPHONE HYDROCHLORIDE 2 MG/ML
0.5 INJECTION, SOLUTION INTRAMUSCULAR; INTRAVENOUS; SUBCUTANEOUS
Status: DISCONTINUED | OUTPATIENT
Start: 2018-01-30 | End: 2018-01-30 | Stop reason: HOSPADM

## 2018-01-30 RX ORDER — PROPOFOL 10 MG/ML
INJECTION, EMULSION INTRAVENOUS AS NEEDED
Status: DISCONTINUED | OUTPATIENT
Start: 2018-01-30 | End: 2018-01-30 | Stop reason: HOSPADM

## 2018-01-30 RX ORDER — FENTANYL CITRATE 50 UG/ML
INJECTION, SOLUTION INTRAMUSCULAR; INTRAVENOUS AS NEEDED
Status: DISCONTINUED | OUTPATIENT
Start: 2018-01-30 | End: 2018-01-30 | Stop reason: HOSPADM

## 2018-01-30 RX ORDER — ROCURONIUM BROMIDE 10 MG/ML
INJECTION, SOLUTION INTRAVENOUS AS NEEDED
Status: DISCONTINUED | OUTPATIENT
Start: 2018-01-30 | End: 2018-01-30 | Stop reason: HOSPADM

## 2018-01-30 RX ORDER — BACITRACIN 50000 [IU]/1
INJECTION, POWDER, FOR SOLUTION INTRAMUSCULAR AS NEEDED
Status: DISCONTINUED | OUTPATIENT
Start: 2018-01-30 | End: 2018-01-30 | Stop reason: HOSPADM

## 2018-01-30 RX ORDER — LIDOCAINE HYDROCHLORIDE 10 MG/ML
0.1 INJECTION INFILTRATION; PERINEURAL AS NEEDED
Status: DISCONTINUED | OUTPATIENT
Start: 2018-01-30 | End: 2018-01-30 | Stop reason: HOSPADM

## 2018-01-30 RX ORDER — DEXAMETHASONE SODIUM PHOSPHATE 4 MG/ML
INJECTION, SOLUTION INTRA-ARTICULAR; INTRALESIONAL; INTRAMUSCULAR; INTRAVENOUS; SOFT TISSUE AS NEEDED
Status: DISCONTINUED | OUTPATIENT
Start: 2018-01-30 | End: 2018-01-30 | Stop reason: HOSPADM

## 2018-01-30 RX ORDER — CEFAZOLIN SODIUM IN 0.9 % NACL 2 G/50 ML
2 INTRAVENOUS SOLUTION, PIGGYBACK (ML) INTRAVENOUS ONCE
Status: DISCONTINUED | OUTPATIENT
Start: 2018-01-30 | End: 2018-01-30 | Stop reason: SDUPTHER

## 2018-01-30 RX ORDER — ONDANSETRON 2 MG/ML
INJECTION INTRAMUSCULAR; INTRAVENOUS AS NEEDED
Status: DISCONTINUED | OUTPATIENT
Start: 2018-01-30 | End: 2018-01-30 | Stop reason: HOSPADM

## 2018-01-30 RX ORDER — MIDAZOLAM HYDROCHLORIDE 1 MG/ML
5 INJECTION, SOLUTION INTRAMUSCULAR; INTRAVENOUS ONCE
Status: DISCONTINUED | OUTPATIENT
Start: 2018-01-30 | End: 2018-01-30 | Stop reason: HOSPADM

## 2018-01-30 RX ORDER — LIDOCAINE HYDROCHLORIDE 20 MG/ML
INJECTION, SOLUTION EPIDURAL; INFILTRATION; INTRACAUDAL; PERINEURAL AS NEEDED
Status: DISCONTINUED | OUTPATIENT
Start: 2018-01-30 | End: 2018-01-30 | Stop reason: HOSPADM

## 2018-01-30 RX ORDER — HYDROCODONE BITARTRATE AND ACETAMINOPHEN 5; 325 MG/1; MG/1
2 TABLET ORAL AS NEEDED
Status: DISCONTINUED | OUTPATIENT
Start: 2018-01-30 | End: 2018-01-30 | Stop reason: HOSPADM

## 2018-01-30 RX ORDER — OXYCODONE HYDROCHLORIDE 5 MG/1
5 TABLET ORAL
Status: DISCONTINUED | OUTPATIENT
Start: 2018-01-30 | End: 2018-01-30 | Stop reason: HOSPADM

## 2018-01-30 RX ORDER — BUPIVACAINE HYDROCHLORIDE 2.5 MG/ML
INJECTION, SOLUTION EPIDURAL; INFILTRATION; INTRACAUDAL AS NEEDED
Status: DISCONTINUED | OUTPATIENT
Start: 2018-01-30 | End: 2018-01-30 | Stop reason: HOSPADM

## 2018-01-30 RX ADMIN — FAMOTIDINE 20 MG: 20 TABLET, FILM COATED ORAL at 06:50

## 2018-01-30 RX ADMIN — Medication 2 G: at 10:20

## 2018-01-30 RX ADMIN — FENTANYL CITRATE 50 MCG: 50 INJECTION, SOLUTION INTRAMUSCULAR; INTRAVENOUS at 11:15

## 2018-01-30 RX ADMIN — FENTANYL CITRATE 50 MCG: 50 INJECTION, SOLUTION INTRAMUSCULAR; INTRAVENOUS at 10:15

## 2018-01-30 RX ADMIN — SODIUM CHLORIDE, SODIUM LACTATE, POTASSIUM CHLORIDE, AND CALCIUM CHLORIDE: 600; 310; 30; 20 INJECTION, SOLUTION INTRAVENOUS at 11:05

## 2018-01-30 RX ADMIN — SODIUM CHLORIDE, SODIUM LACTATE, POTASSIUM CHLORIDE, AND CALCIUM CHLORIDE 75 ML/HR: 600; 310; 30; 20 INJECTION, SOLUTION INTRAVENOUS at 06:50

## 2018-01-30 RX ADMIN — DEXAMETHASONE SODIUM PHOSPHATE 10 MG: 4 INJECTION, SOLUTION INTRA-ARTICULAR; INTRALESIONAL; INTRAMUSCULAR; INTRAVENOUS; SOFT TISSUE at 10:20

## 2018-01-30 RX ADMIN — HYDROCODONE BITARTRATE AND ACETAMINOPHEN 1 TABLET: 5; 325 TABLET ORAL at 11:51

## 2018-01-30 RX ADMIN — LIDOCAINE HYDROCHLORIDE 100 MG: 20 INJECTION, SOLUTION EPIDURAL; INFILTRATION; INTRACAUDAL; PERINEURAL at 10:15

## 2018-01-30 RX ADMIN — ONDANSETRON 4 MG: 2 INJECTION INTRAMUSCULAR; INTRAVENOUS at 11:03

## 2018-01-30 RX ADMIN — PROPOFOL 200 MG: 10 INJECTION, EMULSION INTRAVENOUS at 10:15

## 2018-01-30 RX ADMIN — ROCURONIUM BROMIDE 40 MG: 10 INJECTION, SOLUTION INTRAVENOUS at 10:15

## 2018-01-30 NOTE — OP NOTES
[address]  OPERATIVE REPORT    Destinee High  MR#: [id number]  : 1935  ACCOUNT #: [de-identified]   DATE OF SERVICE: 2018    PREOPERATIVE DIAGNOSIS:  Incarcerated ventral hernia. POSTOPERATIVE DIAGNOSIS:  Incarcerated ventral hernia. PROCEDURE:  Open repair of incarcerated ventral incisional hernia with mesh. SURGEON:  Dr. Otf Yates     ASSISTANTS:  None. ANESTHESIA:  General.    COMPLICATIONS:  None. ESTIMATED BLOOD LOSS:  Less than 30 mL. SPECIMENS:  None. IMPLANTS:  Small circular Ventralex mesh. DESCRIPTION OF PROCEDURE:  The patient was taken to the operating room and placed in supine position and after adequate anesthesia was given, her abdomen was prepped and draped in a sterile fashion with multiple layers of Betadine scrub and Betadine solution. A timeout protocol was followed. She was given prophylactic antibiotics. A linear incision was made in the midline region above the umbilicus over the hernia defect. The subcutaneous tissues were dissected down to the anterior aspect of the hernia sac. The hernia sac was dissected down to its base. It was incarcerated and could not be reduced. We had to enlarge the fascial incision transversely, just slightly to allow the hernia to be reduced. We cleared the fascial edges. The fascia was thin and attenuated and not substantial enough to trust a hernia repair without mesh. Bacitracin irrigation was used. A small circular Ventralex mesh was brought up into the field and soaked in bacitracin and placed in the subfascial location in the proper orientation and secured peripherally with interrupted 0 PDS sutures through good bites of fascia. Once the PDS sutures were secured, the hernia defect was repaired. We then closed the fascia and the fibrous portions of the hernia sac over the mesh to exclude it from the subcutaneous space with 2-0 Vicryl suture.   Bacitracin irrigation was once again used.  Local anesthetic was given. Deep dermal 3-0 Vicryl sutures were placed. The skin was closed with clips. Sterile dressing was placed. The patient tolerated the procedure well. There were no immediate complications.       MD KAREN Matias / JOHANNA  D: 01/30/2018 11:11     T: 01/30/2018 11:38  JOB #: [id number]

## 2018-01-30 NOTE — H&P (VIEW-ONLY)
H&P/Consult Note/Progress Note/Office Note:   Rober Agarwal  MRN: [id number]  LEI:[id number]  Age:82 y.o.    HPI: Rober Agarwal is a 80 y.o. female who we were consulted to see regarding acute, severe, sudden -onset, non-radiating abd pain located over an incarcerated ventral hernia. Nothing seemed to make the pain better or worse. She associated the pain with the sudden development of a palpable mass which appeared on 12/25/17. She had no associated nausea or vomiting. She is uncertain but thinks she has had a prior cholecystectomy and a prior hysterectomy. She was originally admitted 12/22/17 with acute blood loss anemia and melena. She was found in a pool of black tarry stool. WBC was elevated at 15.5, and hgb was noted at 7.5. BUN was also elevated at 50. She was placed on Protonix IV bid and is to be transfused one unit PRBC. She was on daily aspirin at home but denied use of nsaids. She has had no prior EGD and cannot recall last colonoscopy. There were no records listed in the [facility] office for her.       12/22/17 pCXR    IMPRESSION:   No evidence of acute pulmonary disease. .     Her GI bleed was stabilized with transfusion, acid reduction, and hydration. She denied prior abd surgery although the EGD noted changes c/w prior fundoplication and her surgical hx included prior cholecystectomy, hysterectomy and that she is s/p Left Breast NL lumpectomy/axillary dissection on 1/24/16 by Dr Fito Bertrand for left breast carcinoma      12/22/17 s/p EGD (Dr Christian Huynh) showed:  Findings:   Esophagus- Normal.  Stomach- Black liquid and clots noted. No active bleeding seen. I could not suction out enough liquid to see the greater curvature of the body of the stomach. Sutures seen in the proximal stomach and evidence for the a fundoplication which appears intact. The antrum and pylorus were normal.   Duodenum- Normal.    Therapies: None  Specimens: None  Impression:   Upper gi bleed.  Old blood in stomach and not able to clear enough to see the body of the stomach well. ? Prior fundoplication.     Recommendations:  IV PPIs. Repeat EGD if active bleeding.     12/25/17 CT abd/pelvis with oral and IV contrast  Small ventral hernia just above the umbilicus which contains fat only and no bowel loops. The fascial defect measures 15 mm and the hernia sac 47 mm. Minimal stranding densities within the fat. Dense calcifications of the mitral annulus. The lung windows demonstrate a 5 mm pleural-based pulmonary  nodule posterior right lung base. The liver and spleen appear homogeneous. The  gallbladder is absent. The biliary tree is not dilated. The pancreas is  unremarkable. No free air or free fluid. Bowel loops are not dilated. The  kidneys enhance uniformly except for a small cyst in the lateral left kidney. No  radiopaque renal calculi. No hydronephrosis. The adrenal glands are normal size. Aorta is normal caliber and calcified.     Pelvis: No free fluid or acute inflammatory changes. The urinary bladder unremarkable. Uterus is absent. No gross bony lesions.      IMPRESSION:  There is a small ventral hernia just above the umbilicus which contains fat only and no bowel loops. The fascial defect measures 15 mm and the hernia sac 47 mm. Minimal stranding densities within the fat. A 5 mm pulmonary nodule posterior right lung base.  s/p cholecystectomy, s/p hysterectomy    12/26/17 no abd pain over hernia site this am    Past Medical History:   Diagnosis Date    Acute bronchitis     Anxiety state, unspecified     Cancer ([facility] [facility] 12/13    left breast    Chest pain, unspecified     Depression     Depressive disorder, not elsewhere classified 5/13/2015    Disorder of bone and cartilage, unspecified 5/13/2015    Enthesopathy of unspecified site 5/13/2015    Essential hypertension, benign 5/13/2015    GERD (gastroesophageal reflux disease)     no longer has    Hip, thigh, leg, and ankle, blister, without mention of infection     Hypercholesterolemia     Hypertension     controlled with medication    Impaired fasting glucose 5/13/2015    Loss of height     Malignant neoplasm of breast (female), unspecified site     Mixed incontinence urge and stress (male)(female)     Other and unspecified hyperlipidemia 5/13/2015    Other unspecified back disorder     Overweight(278.02)     Psychiatric disorder     depression    Unspecified asthma(493.90)     Unspecified hemorrhoids without mention of complication 3/69/6331    Unspecified menopausal and postmenopausal disorder      Past Surgical History:   Procedure Laterality Date    ABDOMEN SURGERY PROC UNLISTED      esophagus to correct GERD unsure of procedure name    HX BREAST LUMPECTOMY  1/24/2014    LEFT BREAST NEEDLE LOCALIZED MASS EXCISION     performed by Nader Tom MD at [address] HX HYSTERECTOMY  1970s    HX LAP CHOLECYSTECTOMY  2001     Current Outpatient Prescriptions   Medication Sig    sertraline (ZOLOFT) 25 mg tablet Take 1 Tab by mouth daily.  pantoprazole (PROTONIX) 40 mg tablet Take 1 Tab by mouth ACB/HS. Indications: Twice a day for one month and then once daily.  calcium-cholecalciferol, d3, (CALCIUM 600 + D) 600-125 mg-unit tab Take 1,200 Units by mouth.  atorvastatin (LIPITOR) 40 mg tablet Take 1 Tab by mouth daily.  hydroCHLOROthiazide (HYDRODIURIL) 25 mg tablet Take 1 Tab by mouth daily.  traMADol (ULTRAM) 50 mg tablet Take 1 Tab by mouth every six (6) hours as needed for Pain. Max Daily Amount: 200 mg.    anastrozole (ARIMIDEX) 1 mg tablet Take 1 Tab by mouth daily.  PARoxetine (PAXIL) 20 mg tablet TAKE 1 TABLET EVERY DAY    amLODIPine (NORVASC) 5 mg tablet TAKE 1 TABLET EVERY DAY    potassium chloride (K-DUR, KLOR-CON) 20 mEq tablet Take 1 Tab by mouth two (2) times a day. (Patient taking differently: Take 20 mEq by mouth daily.)    loratadine (CLARITIN) 10 mg tablet Take 10 mg by mouth daily.     cholecalciferol (VITAMIN D3) 1,000 unit tablet Take  by mouth daily.  FOLIC ACID/MULTIVIT-MIN/LUTEIN (CENTRUM SILVER PO) Take 1 Tab by mouth daily. No current facility-administered medications for this visit. Review of patient's allergies indicates no known allergies. Social History     Social History    Marital status:      Spouse name: N/A    Number of children: N/A    Years of education: N/A     Social History Main Topics    Smoking status: Never Smoker    Smokeless tobacco: Never Used    Alcohol use Yes      Comment: rarely    Drug use: No    Sexual activity: Not Asked     Other Topics Concern    None     Social History Narrative     History   Smoking Status    Never Smoker   Smokeless Tobacco    Never Used     Family History   Problem Relation Age of Onset    Cancer Maternal Grandmother      breast at old age   [employer] Lung Disease Mother     Cancer Father     Diabetes Brother     Lung Disease Brother     Alcohol abuse Brother      ROS: The patient has no difficulty with chest pain or shortness of breath. No fever or chills. Comprehensive review of systems was otherwise unremarkable except as noted above. Physical Exam:   There were no vitals taken for this visit. Constitutional: Alert, oriented, cooperative patient in no acute distress; appears stated age    Eyes:Sclera are clear. EOMs intact  ENMT: no external lesions gross hearing normal; no obvious neck masses, no ear or lip lesions, nares normal  CV: RRR. Normal perfusion  Resp: No JVD. Breathing is  non-labored; no audible wheezing. GI: obese; incarcerated ventral hernia reduced on 12/25/17; palpable and not reduced on 1/4/18; abd soft/NT/ND     Musculoskeletal: unremarkable with normal function. No embolic signs or cyanosis.    Neuro:  Oriented; moves all 4; no focal deficits  Psychiatric: normal affect and mood, no memory impairment    Recent vitals (if inpt):  Patient Vitals for the past 24 hrs:   BP Temp Pulse Resp SpO2   12/26/17 0650 165/80 98.6 °F (37 °C) 80 18 96 %   12/26/17 0441 160/84 98.6 °F (37 °C) 71 18 96 %   12/26/17 0122 158/72 98.3 °F (36.8 °C) 78 18 96 %   12/25/17 2002 150/65 98.1 °F (36.7 °C) 81 18 98 %   12/25/17 1502 151/72 98.5 °F (36.9 °C) 74 20 98 %   12/25/17 1110 149/74 97.8 °F (36.6 °C) 71 20 96 %       Labs:  No results for input(s): WBC, HGB, PLT, NA, K, CL, CO2, BUN, CREA, GLU, PTP, INR, APTT, TBIL, TBILI, CBIL, SGOT, GPT, ALT, AP, AML, LPSE, LCAD, NH4, TROPT, TROIQ, PCO2, PO2, HCO3, HGBEXT, PLTEXT, HGBEXT, PLTEXT in the last 72 hours. No lab exists for component:  PH, INREXT, INREXT    Lab Results   Component Value Date/Time    WBC 15.5 12/22/2017 04:15 AM    HGB 10.3 12/26/2017 08:59 AM    PLATELET 849 92/77/1745 04:15 AM    Sodium 145 12/25/2017 04:02 AM    Potassium 3.4 12/25/2017 04:02 AM    Chloride 110 12/25/2017 04:02 AM    CO2 25 12/25/2017 04:02 AM    BUN 7 12/25/2017 04:02 AM    Creatinine 0.45 12/25/2017 04:02 AM    Glucose 116 12/25/2017 04:02 AM    INR 1.0 01/17/2014 11:37 AM    aPTT 26.7 01/17/2014 11:37 AM    Bilirubin, total 0.7 12/25/2017 04:02 AM    AST (SGOT) 51 12/25/2017 04:02 AM    ALT (SGPT) 35 12/25/2017 04:02 AM    Alk. phosphatase 73 12/25/2017 04:02 AM    Lipase 201 12/22/2017 04:15 AM       I reviewed recent labs and recent radiologic studies. I independently reviewed radiology images for studies I described above or studies I have ordered. Admission date (for inpatients): (Not on file)   3 Days Post-Op  Procedure(s):  ESOPHAGOGASTRODUODENOSCOPY (EGD)/625    ASSESSMENT/PLAN:  Problem List  Date Reviewed: 1/4/2018          Codes Class Noted    [facility] ICD-10-CM: F33.9  ICD-9-CM: 296.30  1/3/2018        Incarcerated incisional hernia ICD-10-CM: K43.0  ICD-9-CM: 552.21  12/25/2017        Melena ICD-10-CM: K92.1  ICD-9-CM: 578.1  12/22/2017        Upper GI bleed ICD-10-CM: K92.2  ICD-9-CM: 578.9  12/22/2017        Fall ICD-10-CM: B38. Felicitas Sales  ICD-9-CM: W862.0  12/22/2017 Hyperglycemia ICD-10-CM: R73.9  ICD-9-CM: 790.29  4/4/2017        Pulmonary nodules ICD-10-CM: R91.8  ICD-9-CM: 793.19  11/8/2016        Osteoporosis ICD-10-CM: M81.0  ICD-9-CM: 733.00  3/3/2016        Fatigue ICD-10-CM: R53.83  ICD-9-CM: 780.79  2/19/2016        Back pain ICD-10-CM: M54.9  ICD-9-CM: 724.5  2/2/2016        Recurrent major depressive disorder ([facility] ICD-10-CM: F33.9  ICD-9-CM: 296.30  5/13/2015        Unspecified hemorrhoids without mention of complication AEP-49-KS: E16.2  ICD-9-CM: 455.6  5/13/2015        Hyperlipidemia ICD-10-CM: E78.5  ICD-9-CM: 272.4  5/13/2015        Essential hypertension, benign ICD-10-CM: I10  ICD-9-CM: 401.1  5/13/2015        Disorder of bone and cartilage, unspecified ICD-10-CM: M89.9, M94.9  ICD-9-CM: 733.90  5/13/2015        Impaired fasting glucose ICD-10-CM: R73.01  ICD-9-CM: 790.21  5/13/2015        Enthesopathy of unspecified site ICD-10-CM: M77.9  ICD-9-CM: 726.90  5/13/2015        Breast cancer, left ([facility] ICD-10-CM: V02.066  ICD-9-CM: 174.9  1/10/2014            Active Problems:    * No active hospital problems. *     Incarcerated Ventral Incisional hernia  Informed consent was obtained for open ventral hernia repair possibly with mesh. Procedural risks discussed including bleeding, infection, mesh infection requiring removal, risk of anesthesia, etc... Risks also dicussed of not having hernia repaired including incarceration and sepsis. We will schedule this for her soon.       Acute Blood loss anemia/UGI Bleed from previous hospitalization Dec 2017  Transfuse as needed while hospitalized;  Proton pump Inhibitor  GI outpt follow-up    5mm Right lung nodule on CT scan 12/25/17  Recommend follow-up with LMD with f/u imaging      Signed:  Tanya Hernandez MD,  FACS

## 2018-01-30 NOTE — ANESTHESIA POSTPROCEDURE EVALUATION
Post-Anesthesia Evaluation and Assessment    Patient: Joe Keller MRN: [id number]  SSN: [id number]    YOB: 1935  Age: 80 y.o. Sex: female       Cardiovascular Function/Vital Signs  Visit Vitals    /69    Pulse 71    Temp 37.1 °C (98.8 °F)    Resp 16    Ht 5' 1\" (1.549 m)    Wt 85.8 kg (189 lb 1 oz)    SpO2 96%    BMI 35.72 kg/m2       Patient is status post general anesthesia for Procedure(s):  OPEN HERNIA VENTRAL REPAIR. Nausea/Vomiting: None    Postoperative hydration reviewed and adequate. Pain:  Pain Scale 1: Numeric (0 - 10) (01/30/18 1151)  Pain Intensity 1: 3 (01/30/18 1151)   Managed    Neurological Status:   Neuro (WDL): Exceptions to WDL (01/30/18 1116)  Neuro  Neurologic State: Drowsy (01/30/18 1116)  LUE Motor Response: Purposeful (01/30/18 1116)  LLE Motor Response: Purposeful (01/30/18 1116)  RUE Motor Response: Purposeful (01/30/18 1116)  RLE Motor Response: Purposeful (01/30/18 1116)   At baseline    Mental Status and Level of Consciousness: Arousable    Pulmonary Status:   O2 Device: Oxygen mask (01/30/18 1116)   Adequate oxygenation and airway patent    Complications related to anesthesia: None    Post-anesthesia assessment completed.  No concerns    Signed By: Italia Lisa MD     January 30, 2018

## 2018-01-30 NOTE — ANESTHESIA PREPROCEDURE EVALUATION
Anesthetic History   No history of anesthetic complications            Review of Systems / Medical History  Patient summary reviewed and pertinent labs reviewed    Pulmonary  Within defined limits                 Neuro/Psych       CVA: no residual symptoms       Cardiovascular    Hypertension: well controlled              Exercise tolerance: >4 METS     GI/Hepatic/Renal     GERD: well controlled           Endo/Other        Cancer (breast)     Other Findings            Physical Exam    Airway  Mallampati: II  TM Distance: 4 - 6 cm  Neck ROM: normal range of motion   Mouth opening: Normal     Cardiovascular  Regular rate and rhythm,  S1 and S2 normal,  no murmur, click, rub, or gallop             Dental    Dentition: Full lower dentures and Full upper dentures     Pulmonary  Breath sounds clear to auscultation               Abdominal  GI exam deferred       Other Findings            Anesthetic Plan    ASA: 2  Anesthesia type: general          Induction: Intravenous  Anesthetic plan and risks discussed with: Patient and Son / Daughter

## 2018-01-30 NOTE — DISCHARGE INSTRUCTIONS
Leave dressing until follow-up. Try to keep incision as dry as possible to lower risk of infection. No heavy lifting (>5lbs) for 6 weeks to reduce risk of developing a hernia in the incisions. No driving until you are off pain meds for 24hrs and have no pain with movements associated with driving. Pain prescription (Percocet) on chart for patient to use as needed for pain  Follow-up with Dr Consuelo Carbone 2/8/2018 at 2:30 p[address]    HERNIA REPAIR    ACTIVITY  · As tolerated and as directed by your doctor. · You may shower starting tomorrow but do not take a bath until released by your doctor. · Avoid lifting more than 5 pounds (pulling and straining). Avoid excessive use of stairs. · Take deep breathes and support incision with pillow when you cough. DIET  · Clear liquids until no nausea or vomiting; then light diet for the first day. · Advance to regular diet on second day, unless directed by your doctor. · If nausea or vomiting continues, call your doctor. You may notice that your bowel movements are not regular right after your surgery. This is common. Try to avoid constipation and straining with bowel movements. You may want to take a fiber supplement every day (Miralax). If you have not had a bowel movement after a couple of days, ask your doctor about taking a mild laxative. CALL YOUR DOCTOR IF   · Excessive bleeding that does not stop after holding pressure over the area. · Temperature of 101 degrees F or above. · Redness, excessive swelling or bruising, and/ or green or yellow, smelly discharge from incision. AFTER ANESTHESIA  · For the first 24 hours: DO NOT drive, drink alcoholic beverages, or make important decisions. · Be aware of dizziness following anesthesia and while taking pain medication.   · Limit your activities  · Do not  operate hazardous machinery  · If you have not urinated within 8 hours after discharge, please contact your surgeon on call. *  Please give a list of your current medications to your Primary Care Provider. *  Please update this list whenever your medications are discontinued, doses are      changed, or new medications (including over-the-counter products) are added. *  Please carry medication information at all times in case of emergency situations. No smoking/ No tobacco products/ Avoid exposure to second hand smoke  Surgeon General's Warning:  Quitting smoking now greatly reduces serious risk to your health. Obesity, smoking, and sedentary lifestyle greatly increases your risk for illness  A healthy diet, regular physical exercise & weight monitoring are important for maintaining a healthy lifestyle    Recognize signs and symptoms of STROKE:  F-face looks uneven  A-arms unable to move or move unevenly  S-speech slurred or non-existent  T-time-call 911 as soon as signs and symptoms begin-DO NOT go       Back to bed or wait to see if you get better-TIME IS BRAIN.

## 2018-01-30 NOTE — IP AVS SNAPSHOT
[address] [facility] 
 
 
 [address] [facility] [address] 
[phone] Patient: Marilia Ochoa MRN: [id number] FCV:[id number] About your hospitalization You were admitted on:  January 30, 2018 You last received care in the:  [facility] PACU You were discharged on:  January 30, 2018 Why you were hospitalized Your primary diagnosis was:  Not on File Follow-up Information Follow up With Details Comments Contact Info Mathwe Donaldson MD   [address] [facility] [address] 
[phone] Carter Painting MD Follow up on 2/8/2018 at 2:30 p.m. [address] 
[address] [address] 
[phone] Your Scheduled Appointments Thursday February 08, 2018  2:30 PM EST Global Post Op with Carter Painting MD  
[facility] ([facility]) [address] [place] [address]  
[phone] Thursday March 08, 2018  2:00 PM EST Annual Wellness Exam with Chava Olmstead [facility] ([address] [place] [address]) [address] [address]  
[phone] Thursday March 08, 2018  2:30 PM EST Office Visit with Mathew Donaldson MD  
[facility] ([address] [place] [address]) [address] [place] [id number]  
[phone] Discharge Orders None A check josias indicates which time of day the medication should be taken. My Medications CHANGE how you take these medications Instructions Each Dose to Equal  
 Morning Noon Evening Bedtime  
 amLODIPine 5 mg tablet Commonly known as:  Davis Rodney What changed:   
- how much to take 
- how to take this - when to take this 
- additional instructions Your last dose was: Your next dose is: TAKE 1 TABLET EVERY DAY  
     
   
   
   
  
 anastrozole 1 mg tablet Commonly known as:  ARIMIDEX What changed:  additional instructions Your last dose was: Your next dose is: Take 1 Tab by mouth daily. 1 mg  
    
   
   
   
  
 atorvastatin 40 mg tablet Commonly known as:  LIPITOR What changed:  when to take this Your last dose was: Your next dose is: Take 1 Tab by mouth daily. 40 mg  
    
   
   
   
  
 pantoprazole 40 mg tablet Commonly known as:  PROTONIX What changed:  additional instructions Your last dose was: Your next dose is: Take 1 Tab by mouth ACB/HS. Indications: Twice a day for one month and then once daily. 40 mg PARoxetine 20 mg tablet Commonly known as:  PAXIL What changed:   
- how much to take 
- how to take this - when to take this 
- additional instructions Your last dose was: Your next dose is: TAKE 1 TABLET EVERY DAY  
     
   
   
   
  
 potassium chloride 20 mEq tablet Commonly known as:  K-RICARDO MCCRARYOR-CON What changed:  when to take this Your last dose was: Your next dose is: Take 1 Tab by mouth two (2) times a day. 20 mEq  
    
   
   
   
  
 traMADol 50 mg tablet Commonly known as:  ULTRAM  
What changed:  additional instructions Your last dose was: Your next dose is: Take 1 Tab by mouth every six (6) hours as needed for Pain. Max Daily Amount: 200 mg.  
 50 mg CONTINUE taking these medications Instructions Each Dose to Equal  
 Morning Noon Evening Bedtime  
 calcium carbonate 600 mg calcium (1,500 mg) tablet Commonly known as:  Deng Fent Your last dose was: Your next dose is: Take 1,200 mg by mouth daily. Stop seven days prior to surgery per anesthesia protocol. 1200 mg CENTRUM SILVER PO Your last dose was: Your next dose is: Take 1 Tab by mouth daily. Stop seven days prior to surgery per anesthesia protocol. 1 Tab  
    
   
   
   
  
 hydroCHLOROthiazide 25 mg tablet Commonly known as:  HYDRODIURIL Your last dose was: Your next dose is: Take 1 Tab by mouth daily. 25 mg  
    
   
   
   
  
 loratadine 10 mg tablet Commonly known as:  Emily Bones Your last dose was: Your next dose is: Take 10 mg by mouth daily. Indications: morning 10 mg  
    
   
   
   
  
 VITAMIN D3 1,000 unit tablet Generic drug:  cholecalciferol Your last dose was: Your next dose is: Take 1,000 Units by mouth daily. Stop seven days prior to surgery per anesthesia protocol. 1000 Units Discharge Instructions Leave dressing until follow-up. Try to keep incision as dry as possible to lower risk of infection. No heavy lifting (>5lbs) for 6 weeks to reduce risk of developing a hernia in the incisions. No driving until you are off pain meds for 24hrs and have no pain with movements associated with driving. Pain prescription (Percocet) on chart for patient to use as needed for pain Follow-up with Dr Lutz Person 2/8/2018 at 2:30 p.m. [address] [facility] [address] [facility], [address] HERNIA REPAIR 
 
ACTIVITY · As tolerated and as directed by your doctor. · You may shower starting tomorrow but do not take a bath until released by your doctor. · Avoid lifting more than 5 pounds (pulling and straining). Avoid excessive use of stairs. · Take deep breathes and support incision with pillow when you cough. DIET · Clear liquids until no nausea or vomiting; then light diet for the first day. · Advance to regular diet on second day, unless directed by your doctor. · If nausea or vomiting continues, call your doctor.   
You may notice that your bowel movements are not regular right after your surgery. This is common. Try to avoid constipation and straining with bowel movements. You may want to take a fiber supplement every day (Miralax). If you have not had a bowel movement after a couple of days, ask your doctor about taking a mild laxative. CALL YOUR DOCTOR IF  
· Excessive bleeding that does not stop after holding pressure over the area. · Temperature of 101 degrees F or above. · Redness, excessive swelling or bruising, and/ or green or yellow, smelly discharge from incision. AFTER ANESTHESIA · For the first 24 hours: DO NOT drive, drink alcoholic beverages, or make important decisions. · Be aware of dizziness following anesthesia and while taking pain medication. · Limit your activities · Do not  operate hazardous machinery · If you have not urinated within 8 hours after discharge, please contact your surgeon on call. *  Please give a list of your current medications to your Primary Care Provider. *  Please update this list whenever your medications are discontinued, doses are 
    changed, or new medications (including over-the-counter products) are added. *  Please carry medication information at all times in case of emergency situations. No smoking/ No tobacco products/ Avoid exposure to second hand smoke Surgeon General's Warning:  Quitting smoking now greatly reduces serious risk to your health. Obesity, smoking, and sedentary lifestyle greatly increases your risk for illness A healthy diet, regular physical exercise & weight monitoring are important for maintaining a healthy lifestyle Recognize signs and symptoms of STROKE: 
F-face looks uneven A-arms unable to move or move unevenly S-speech slurred or non-existent T-time-call 911 as soon as signs and symptoms begin-DO NOT go Back to bed or wait to see if you get better-TIME IS BRAIN. Introducing [facility] [place] [facility]!    
 [facility] introduces [employer] patient portal. Now you can access parts of your medical record, email your doctor's office, and request medication refills online. 1. In your internet browser, go to [email or web address] 2. Click on the First Time User? Click Here link in the Sign In box. You will see the New Member Sign Up page. 3. Enter your [employer] Access Code exactly as it appears below. You will not need to use this code after youve completed the sign-up process. If you do not sign up before the expiration date, you must request a new code. · [employer] Access Code: [email or web address] Expires: 3/22/2018  4:09 AM 
 
4. Enter the last four digits of your Social Security Number (xxxx) and Date of Birth (mm/dd/yyyy) as indicated and click Submit. You will be taken to the next sign-up page. 5. Create a [employer] ID. This will be your [employer] login ID and cannot be changed, so think of one that is secure and easy to remember. 6. Create a [employer] password. You can change your password at any time. 7. Enter your Password Reset Question and Answer. This can be used at a later time if you forget your password. 8. Enter your e-mail address. You will receive e-mail notification when new information is available in [address] 9. Click Sign Up. You can now view and download portions of your medical record. 10. Click the Download Summary menu link to download a portable copy of your medical information. If you have questions, please visit the Frequently Asked Questions section of the [employer] website. Remember, [employer] is NOT to be used for urgent needs. For medical emergencies, dial 911. Now available from your iPhone and Android! Providers Seen During Your Hospitalization Provider Specialty Primary office phone Sj Long MD General Surgery [phone] Your Primary Care Physician (PCP) Primary Care Physician Office Phone Office Fax [address], [facility] [phone] You are allergic to the following Allergen Reactions Aleve (Naproxen Sodium) Nausea Only Aspirin Other (comments) Pt reports she can not take due to internal bleeding Fosamax (Alendronate) Itching Recent Documentation Height Weight BMI OB Status Smoking Status 1.549 m 85.8 kg 35.72 kg/m2 Hysterectomy Never Smoker Emergency Contacts Name Discharge Info Relation Home Work [facility] DISCHARGE CAREGIVER [3] Daughter [21] [phone] Yan Garrison  Son [22] [phone] Patient Belongings The following personal items are in your possession at time of discharge: 
  Dental Appliances: Uppers, Lowers         Home Medications: None   Jewelry: None  Clothing: Shirt, Pants, Undergarments, Footwear, Jacket/Coat, Socks    Other Valuables: None Please provide this summary of care documentation to your next provider. Signatures-by signing, you are acknowledging that this After Visit Summary has been reviewed with you and you have received a copy. Patient Signature:  ____________________________________________________________ Date:  ____________________________________________________________  
  
Christi Dunaway Provider Signature:  ____________________________________________________________ Date:  ____________________________________________________________

## 2018-01-30 NOTE — BRIEF OP NOTE
BRIEF OPERATIVE NOTE    Date of Procedure:  1/30/2018    Preoperative Diagnosis: Incarcerated ventral hernia [K43.0]  Postoperative Diagnosis: same    Procedure:  Procedure(s):  OPEN HERNIA VENTRAL REPAIR   with small circular ventralex mesh 4.3cm     Surgeon(s) and Role:     * Tanya Hernandez MD - Primary  Anesthesia: General  Estimated Blood Loss: <30cc  Specimens: * No specimens in log *  Findings: see op note   Complications: none  Implants:   Implant Name Type Inv.  Item Serial No.  Lot No. LRB No. Used Action   MESH DENIA CIR SM 4.3CM PTFE --  - [id number]   MESH DENIA CIR SM 4.3CM PTFE --    BARD ALLIE [id number] N/A 1 Implanted

## 2018-01-30 NOTE — INTERVAL H&P NOTE
H&P Update:  Sindi Bermudez was seen and examined. History and physical has been reviewed. The patient has been examined.  There have been no significant clinical changes since the completion of the originally dated History and Physical.    Signed By: Peter White MD     January 30, 2018 9:35 AM

## 2018-03-08 PROBLEM — K43.0 INCARCERATED INCISIONAL HERNIA: Status: RESOLVED | Noted: 2017-12-25 | Resolved: 2018-03-08

## 2018-03-08 PROBLEM — K92.1 MELENA: Status: RESOLVED | Noted: 2017-12-22 | Resolved: 2018-03-08
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
## 2018-03-08 PROBLEM — K92.2 UPPER GI BLEED: Status: RESOLVED | Noted: 2017-12-22 | Resolved: 2018-03-08

## 2018-05-24 PROBLEM — E66.01 SEVERE OBESITY (BMI 35.0-39.9) WITH COMORBIDITY (HCC): Status: ACTIVE | Noted: 2018-05-24

## 2018-09-17 NOTE — PROCEDURES
Endoscopic Gastroduodenoscopy Procedure Note    Indications:  Melena, anemia    Anesthesia/Sedation: MAC IV     Pre-Procedure Physical:    Current Facility-Administered Medications   Medication Dose Route Frequency    anastrozole (ARIMIDEX) tablet 1 mg  1 mg Oral DAILY    atorvastatin (LIPITOR) tablet 40 mg  40 mg Oral DAILY    PARoxetine (PAXIL) tablet 20 mg  20 mg Oral DAILY    amLODIPine (NORVASC) tablet 2.5 mg  2.5 mg Oral DAILY    sodium chloride (NS) flush 5-10 mL  5-10 mL IntraVENous Q8H    sodium chloride (NS) flush 5-10 mL  5-10 mL IntraVENous PRN    ondansetron (ZOFRAN) injection 4 mg  4 mg IntraVENous Q4H PRN    pantoprazole (PROTONIX) 40 mg in sodium chloride 0.9 % 10 mL injection  40 mg IntraVENous BID    octreotide (SANDOSTATIN) 500 mcg in 0.9% sodium chloride 500 mL infusion  50 mcg/hr IntraVENous ONCE    phenol throat spray (CHLORASEPTIC) 1 Spray  1 Spray Oral PRN    acetaminophen (TYLENOL) tablet 650 mg  650 mg Oral Q6H PRN    tuberculin injection 5 Units  5 Units IntraDERMal ONCE    lactated Ringers infusion  100 mL/hr IntraVENous CONTINUOUS    famotidine (PF) (PEPCID) 20 mg in sodium chloride 0.9 % 10 mL injection  20 mg IntraVENous ONCE PRN    famotidine (PEPCID) tablet 20 mg  20 mg Oral PRN      Review of patient's allergies indicates no known allergies.     Patient Vitals for the past 8 hrs:   BP Temp Pulse Resp SpO2   12/22/17 1353 135/60 - 93 16 95 %   12/22/17 1129 - - - - 97 %   12/22/17 1127 121/55 97.8 °F (36.6 °C) 95 17 97 %   12/22/17 1034 121/59 97.7 °F (36.5 °C) 97 16 98 %   12/22/17 1004 118/56 98 °F (36.7 °C) 100 16 94 %   12/22/17 0859 105/50 98.5 °F (36.9 °C) 98 16 95 %       Exam      Airway: clear   Heart: normal S1and S2    Lungs: clear bilateral  Abdomen: soft, nontender, bowel sounds present and normal in all quads   Mental Status: awake, alert and oriented to person, place and time          Procedure Details     Informed consent was obtained for the procedure, including conscious sedation. Risks of pancreatitis, infection, perforation, hemorrhage, adverse drug reaction and aspiration were discussed. The patient was placed in the left lateral decubitus position. Based on the pre-procedure assessment, including review of the patient's medical history, medications, allergies, and review of systems, she had been deemed to be an appropriate candidate for conscious sedation; she was therefore sedated with the medications listed below. She was monitored continuously with ECG tracing, pulse oximetry, blood pressure monitoring, and direct observation. The EGD gastroscope was inserted into the mouth and advanced under direct vision to the second portion of the duodenum. A careful inspection was made as the gastroscope was withdrawn, including a retroflexed view of the proximal stomach; findings and interventions are described below. Appropriate photodocumentation was obtained. Findings:   Esophagus- Normal.  Stomach- Black liquid and clots noted. No active bleeding seen. I could not suction out enough liquid to see the greater curvature of the body of the stomach. Sutures seen in the proximal stomach and evidence for the a fundoplication which appears intact. The antrum and pylorus were normal.   Duodenum- Normal.    Therapies: None    Specimens: None    Estimated Blood Loss: 0 cc           Complications:   None; patient tolerated the procedure well. Attending Attestation:  I performed the procedure. Impression:   Upper gi bleed. Old blood in stomach and not able to clear enough to see the body of the stomach well. ? Prior fundoplication. Recommendations:  Clears and advance if stable. IV PPIs. Repeat EGD if active bleeding. DC instructions

## 2019-01-20 PROBLEM — C50.812 MALIGNANT NEOPLASM OF OVERLAPPING SITES OF LEFT BREAST IN FEMALE, ESTROGEN RECEPTOR NEGATIVE (HCC): Status: ACTIVE | Noted: 2019-01-20

## 2019-01-20 PROBLEM — Z17.1 MALIGNANT NEOPLASM OF OVERLAPPING SITES OF LEFT BREAST IN FEMALE, ESTROGEN RECEPTOR NEGATIVE (HCC): Status: ACTIVE | Noted: 2019-01-20

## 2020-09-19 ENCOUNTER — HOSPITAL ENCOUNTER (OUTPATIENT)
Dept: MRI IMAGING | Age: 85
Discharge: HOME | End: 2020-09-19
Attending: INTERNAL MEDICINE
Payer: MEDICARE

## 2020-09-19 DIAGNOSIS — S32.010A COMPRESSION FRACTURE OF L1 VERTEBRA, INITIAL ENCOUNTER (HCC): ICD-10-CM

## 2020-09-19 PROCEDURE — 72148 MRI LUMBAR SPINE W/O DYE: CPT

## 2020-10-02 RX ORDER — CEFAZOLIN SODIUM/WATER 2 G/20 ML
2 SYRINGE (ML) INTRAVENOUS ONCE
Status: CANCELLED | OUTPATIENT
Start: 2020-10-02 | End: 2020-10-02

## 2020-10-06 NOTE — H&P
Medications:amLODIPine Besylate (5 MG); Atorvastatin Calcium (40 MG);hydroCHLOROthiazide (25 MG); HYDROcodone-Acetaminophen (5-325 MG); Norco (5-325 MG, Take 1 tablet(s) by mouth 4 times a day as needed [PRN]);PARoxetine HCl (20 MG); Potassium Chloride Elizabeth ER (20 MEQ)      REFERRING M.D.:  Paulo Kline    CC:  Back pain    HPI:  Ms. Noe Lackey is a new patient. She is a retired 57-year-old white female who complains of back pain since 09/01/2020. She went to bed. There was no injury. She awoke in the morning and had acute pain. It had gotten slowly worse, but it seems like it has plateaued at this point. She denies having any leg pain, numbness, tingling, weakness, bowel and bladder incontinence. The pain is constant. It is especially bad getting in and out of her bed, but she is sleeping in a bed; getting up and down and any type of movement makes the pain worse. Reclining or laying still seems to help. Of note is the fact she is not diabetic and not on blood thinners. No major medical problems. TREATMENT:  Activity restriction, Lidoderm patches and Norco 5 mg. SH:  She does not smoke, drink or use drugs. She is . PMH/ROS/FH/MEDS/ALLERGIES:  This form has been filled out, reviewed and included in the chart. Pertinent positive findings on ROS related to musculoskeletal problems were discussed with the patient. The non-musculoskeletal-related complaints were deferred to primary care physician. PE:  The patient is 5'1\" and weighs 230 pounds. She is normal in appearance, alert and oriented x3. She looks very uncomfortable. Her back is tender to palpation in the thoracolumbar area. Straight leg raising test is negative. Motor sensory test is normal in the lower extremities. Hip/knee range of motion is reasonable without pain. Her pupils are equal, round and reactive to light. Her neck is without adenopathy or bruit. Her lungs were clear to auscultation bilaterally.   Heart is regular rate and rhythm without murmur. Her abdomen is obese, soft and nontender. IMAGING:  She has an MRI scan from 09/10/2020. It shows an acute compression fracture at T11 with signal change in the vertebral body. There is an old fracture at L1, but the marrow signal is normal.         ASSESSMENT AND PLAN:  I talked to the patient and her family, including a family member on the phone. I discussed the problem and went through the natural history; usually 3 months to heal, 6 weeks before people start feeling better and then progressively improve thereafter versus a kyphoplasty which is an outpatient surgery. We fill the bone with bone cement, artificially heal it and it usually makes people feel better right away. We went through the risks, including death, paralysis, infection, bleeding, heart attack, stroke, clot in the leg, clot in the lung, migration of cement onto vital structures, perioperative adjacent fractures and the fact I cannot guarantee pain relief. She understand and she wants to do the surgery as soon as possible. We will schedule her for a T11 kyphoplasty. FAX: Cristina Soriano M.D.            Electronically Signed By Mary Ann Nassar MD

## 2020-10-08 ENCOUNTER — ANESTHESIA EVENT (OUTPATIENT)
Dept: SURGERY | Age: 85
End: 2020-10-08
Payer: MEDICARE

## 2020-10-09 ENCOUNTER — APPOINTMENT (OUTPATIENT)
Dept: GENERAL RADIOLOGY | Age: 85
End: 2020-10-09
Attending: ORTHOPAEDIC SURGERY
Payer: MEDICARE

## 2020-10-09 ENCOUNTER — HOSPITAL ENCOUNTER (OUTPATIENT)
Age: 85
Setting detail: OUTPATIENT SURGERY
Discharge: HOME | End: 2020-10-09
Attending: ORTHOPAEDIC SURGERY | Admitting: ORTHOPAEDIC SURGERY
Payer: MEDICARE

## 2020-10-09 ENCOUNTER — ANESTHESIA (OUTPATIENT)
Dept: SURGERY | Age: 85
End: 2020-10-09
Payer: MEDICARE

## 2020-10-09 VITALS
RESPIRATION RATE: 16 BRPM | SYSTOLIC BLOOD PRESSURE: 169 MMHG | WEIGHT: 185 LBS | DIASTOLIC BLOOD PRESSURE: 75 MMHG | HEIGHT: 61 IN | TEMPERATURE: 98.2 F | OXYGEN SATURATION: 100 % | HEART RATE: 75 BPM | BODY MASS INDEX: 34.93 KG/M2

## 2020-10-09 DIAGNOSIS — M80.08XA FRACTURE OF VERTEBRA DUE TO OSTEOPOROSIS, INITIAL ENCOUNTER (HCC): Primary | ICD-10-CM

## 2020-10-09 LAB — POTASSIUM BLD-SCNC: 3.6 MMOL/L (ref 3.5–5.1)

## 2020-10-09 PROCEDURE — 74011250636 HC RX REV CODE- 250/636: Performed by: NURSE ANESTHETIST, CERTIFIED REGISTERED

## 2020-10-09 PROCEDURE — 88305 TISSUE EXAM BY PATHOLOGIST: CPT

## 2020-10-09 PROCEDURE — 74011250636 HC RX REV CODE- 250/636: Performed by: ANESTHESIOLOGY

## 2020-10-09 PROCEDURE — 77030040361 HC SLV COMPR DVT MDII -B: Performed by: ORTHOPAEDIC SURGERY

## 2020-10-09 PROCEDURE — 74011000250 HC RX REV CODE- 250: Performed by: NURSE ANESTHETIST, CERTIFIED REGISTERED

## 2020-10-09 PROCEDURE — 76210000020 HC REC RM PH II FIRST 0.5 HR: Performed by: ORTHOPAEDIC SURGERY

## 2020-10-09 PROCEDURE — 74011000250 HC RX REV CODE- 250: Performed by: ORTHOPAEDIC SURGERY

## 2020-10-09 PROCEDURE — 74011250636 HC RX REV CODE- 250/636

## 2020-10-09 PROCEDURE — 76010000138 HC OR TIME 0.5 TO 1 HR: Performed by: ORTHOPAEDIC SURGERY

## 2020-10-09 PROCEDURE — 77030037088 HC TUBE ENDOTRACH ORAL NSL COVD-A: Performed by: ANESTHESIOLOGY

## 2020-10-09 PROCEDURE — 77030039425 HC BLD LARYNG TRULITE DISP TELE -A: Performed by: ANESTHESIOLOGY

## 2020-10-09 PROCEDURE — 77030018673: Performed by: ORTHOPAEDIC SURGERY

## 2020-10-09 PROCEDURE — 76210000006 HC OR PH I REC 0.5 TO 1 HR: Performed by: ORTHOPAEDIC SURGERY

## 2020-10-09 PROCEDURE — C1713 ANCHOR/SCREW BN/BN,TIS/BN: HCPCS | Performed by: ORTHOPAEDIC SURGERY

## 2020-10-09 PROCEDURE — 74011000636 HC RX REV CODE- 636: Performed by: ORTHOPAEDIC SURGERY

## 2020-10-09 PROCEDURE — 77030012894: Performed by: ORTHOPAEDIC SURGERY

## 2020-10-09 PROCEDURE — 72070 X-RAY EXAM THORAC SPINE 2VWS: CPT

## 2020-10-09 PROCEDURE — 88342 IMHCHEM/IMCYTCHM 1ST ANTB: CPT

## 2020-10-09 PROCEDURE — 2709999900 HC NON-CHARGEABLE SUPPLY: Performed by: ORTHOPAEDIC SURGERY

## 2020-10-09 PROCEDURE — 77030026359 HC KT XPNDR II KYPH -I2: Performed by: ORTHOPAEDIC SURGERY

## 2020-10-09 PROCEDURE — 88341 IMHCHEM/IMCYTCHM EA ADD ANTB: CPT

## 2020-10-09 PROCEDURE — 76060000032 HC ANESTHESIA 0.5 TO 1 HR: Performed by: ORTHOPAEDIC SURGERY

## 2020-10-09 PROCEDURE — 84132 ASSAY OF SERUM POTASSIUM: CPT

## 2020-10-09 PROCEDURE — 88311 DECALCIFY TISSUE: CPT

## 2020-10-09 PROCEDURE — 74011250637 HC RX REV CODE- 250/637: Performed by: ANESTHESIOLOGY

## 2020-10-09 PROCEDURE — 77030002916 HC SUT ETHLN J&J -A: Performed by: ORTHOPAEDIC SURGERY

## 2020-10-09 PROCEDURE — 77030011218 HC DEV BIOP BN KYPH -C: Performed by: ORTHOPAEDIC SURGERY

## 2020-10-09 PROCEDURE — 74011250637 HC RX REV CODE- 250/637

## 2020-10-09 DEVICE — BONE CEMENT CX01B KYPHON XPEDE W MXR US
Type: IMPLANTABLE DEVICE | Site: SPINE THORACIC | Status: FUNCTIONAL
Brand: KYPHON® XPEDE™ BONE CEMENT AND KYPHON® MIXER PACK

## 2020-10-09 RX ORDER — ROCURONIUM BROMIDE 10 MG/ML
INJECTION, SOLUTION INTRAVENOUS AS NEEDED
Status: DISCONTINUED | OUTPATIENT
Start: 2020-10-09 | End: 2020-10-09 | Stop reason: HOSPADM

## 2020-10-09 RX ORDER — OXYCODONE HYDROCHLORIDE 5 MG/1
5 TABLET ORAL
Status: COMPLETED | OUTPATIENT
Start: 2020-10-09 | End: 2020-10-09

## 2020-10-09 RX ORDER — HYDROMORPHONE HYDROCHLORIDE 2 MG/ML
0.5 INJECTION, SOLUTION INTRAMUSCULAR; INTRAVENOUS; SUBCUTANEOUS
Status: DISCONTINUED | OUTPATIENT
Start: 2020-10-09 | End: 2020-10-09 | Stop reason: HOSPADM

## 2020-10-09 RX ORDER — ACETAMINOPHEN 325 MG/1
650 TABLET ORAL ONCE
Status: COMPLETED | OUTPATIENT
Start: 2020-10-09 | End: 2020-10-09

## 2020-10-09 RX ORDER — CEFAZOLIN SODIUM/WATER 2 G/20 ML
2 SYRINGE (ML) INTRAVENOUS ONCE
Status: COMPLETED | OUTPATIENT
Start: 2020-10-09 | End: 2020-10-09

## 2020-10-09 RX ORDER — ONDANSETRON 2 MG/ML
INJECTION INTRAMUSCULAR; INTRAVENOUS AS NEEDED
Status: DISCONTINUED | OUTPATIENT
Start: 2020-10-09 | End: 2020-10-09 | Stop reason: HOSPADM

## 2020-10-09 RX ORDER — BUPIVACAINE HYDROCHLORIDE 5 MG/ML
INJECTION, SOLUTION EPIDURAL; INTRACAUDAL AS NEEDED
Status: DISCONTINUED | OUTPATIENT
Start: 2020-10-09 | End: 2020-10-09 | Stop reason: HOSPADM

## 2020-10-09 RX ORDER — GLYCOPYRROLATE 0.2 MG/ML
INJECTION INTRAMUSCULAR; INTRAVENOUS AS NEEDED
Status: DISCONTINUED | OUTPATIENT
Start: 2020-10-09 | End: 2020-10-09 | Stop reason: HOSPADM

## 2020-10-09 RX ORDER — SODIUM CHLORIDE 0.9 % (FLUSH) 0.9 %
5-40 SYRINGE (ML) INJECTION EVERY 8 HOURS
Status: DISCONTINUED | OUTPATIENT
Start: 2020-10-09 | End: 2020-10-09 | Stop reason: HOSPADM

## 2020-10-09 RX ORDER — DEXAMETHASONE SODIUM PHOSPHATE 4 MG/ML
INJECTION, SOLUTION INTRA-ARTICULAR; INTRALESIONAL; INTRAMUSCULAR; INTRAVENOUS; SOFT TISSUE AS NEEDED
Status: DISCONTINUED | OUTPATIENT
Start: 2020-10-09 | End: 2020-10-09 | Stop reason: HOSPADM

## 2020-10-09 RX ORDER — PROPOFOL 10 MG/ML
INJECTION, EMULSION INTRAVENOUS AS NEEDED
Status: DISCONTINUED | OUTPATIENT
Start: 2020-10-09 | End: 2020-10-09 | Stop reason: HOSPADM

## 2020-10-09 RX ORDER — SODIUM CHLORIDE 0.9 % (FLUSH) 0.9 %
5-40 SYRINGE (ML) INJECTION AS NEEDED
Status: DISCONTINUED | OUTPATIENT
Start: 2020-10-09 | End: 2020-10-09 | Stop reason: HOSPADM

## 2020-10-09 RX ORDER — OXYCODONE AND ACETAMINOPHEN 10; 325 MG/1; MG/1
1 TABLET ORAL AS NEEDED
Status: DISCONTINUED | OUTPATIENT
Start: 2020-10-09 | End: 2020-10-09 | Stop reason: HOSPADM

## 2020-10-09 RX ORDER — LIDOCAINE HYDROCHLORIDE 20 MG/ML
INJECTION, SOLUTION EPIDURAL; INFILTRATION; INTRACAUDAL; PERINEURAL AS NEEDED
Status: DISCONTINUED | OUTPATIENT
Start: 2020-10-09 | End: 2020-10-09 | Stop reason: HOSPADM

## 2020-10-09 RX ORDER — HYDROCODONE BITARTRATE AND ACETAMINOPHEN 5; 325 MG/1; MG/1
1 TABLET ORAL
Qty: 25 TAB | Refills: 0 | Status: SHIPPED | OUTPATIENT
Start: 2020-10-09 | End: 2020-10-16

## 2020-10-09 RX ORDER — SODIUM CHLORIDE, SODIUM LACTATE, POTASSIUM CHLORIDE, CALCIUM CHLORIDE 600; 310; 30; 20 MG/100ML; MG/100ML; MG/100ML; MG/100ML
75 INJECTION, SOLUTION INTRAVENOUS CONTINUOUS
Status: DISCONTINUED | OUTPATIENT
Start: 2020-10-09 | End: 2020-10-09 | Stop reason: HOSPADM

## 2020-10-09 RX ORDER — FENTANYL CITRATE 50 UG/ML
INJECTION, SOLUTION INTRAMUSCULAR; INTRAVENOUS AS NEEDED
Status: DISCONTINUED | OUTPATIENT
Start: 2020-10-09 | End: 2020-10-09 | Stop reason: HOSPADM

## 2020-10-09 RX ORDER — NEOSTIGMINE METHYLSULFATE 1 MG/ML
INJECTION, SOLUTION INTRAVENOUS AS NEEDED
Status: DISCONTINUED | OUTPATIENT
Start: 2020-10-09 | End: 2020-10-09 | Stop reason: HOSPADM

## 2020-10-09 RX ADMIN — ROCURONIUM BROMIDE 30 MG: 10 INJECTION, SOLUTION INTRAVENOUS at 07:52

## 2020-10-09 RX ADMIN — OXYCODONE 5 MG: 5 TABLET ORAL at 09:12

## 2020-10-09 RX ADMIN — FENTANYL CITRATE 25 MCG: 50 INJECTION INTRAMUSCULAR; INTRAVENOUS at 08:08

## 2020-10-09 RX ADMIN — FENTANYL CITRATE 25 MCG: 50 INJECTION INTRAMUSCULAR; INTRAVENOUS at 08:15

## 2020-10-09 RX ADMIN — LIDOCAINE HYDROCHLORIDE 100 MG: 20 INJECTION, SOLUTION EPIDURAL; INFILTRATION; INTRACAUDAL; PERINEURAL at 07:52

## 2020-10-09 RX ADMIN — Medication 2 G: at 08:01

## 2020-10-09 RX ADMIN — Medication 3 MG: at 08:30

## 2020-10-09 RX ADMIN — PROPOFOL 100 MG: 10 INJECTION, EMULSION INTRAVENOUS at 07:52

## 2020-10-09 RX ADMIN — SODIUM CHLORIDE, SODIUM LACTATE, POTASSIUM CHLORIDE, AND CALCIUM CHLORIDE 75 ML/HR: 600; 310; 30; 20 INJECTION, SOLUTION INTRAVENOUS at 06:40

## 2020-10-09 RX ADMIN — ONDANSETRON 4 MG: 2 INJECTION INTRAMUSCULAR; INTRAVENOUS at 08:06

## 2020-10-09 RX ADMIN — DEXAMETHASONE SODIUM PHOSPHATE 4 MG: 4 INJECTION, SOLUTION INTRAMUSCULAR; INTRAVENOUS at 08:06

## 2020-10-09 RX ADMIN — GLYCOPYRROLATE 0.4 MG: 0.2 INJECTION, SOLUTION INTRAMUSCULAR; INTRAVENOUS at 08:30

## 2020-10-09 RX ADMIN — Medication 3 AMPULE: at 06:40

## 2020-10-09 RX ADMIN — ACETAMINOPHEN 650 MG: 325 TABLET, FILM COATED ORAL at 06:40

## 2020-10-09 RX ADMIN — FENTANYL CITRATE 50 MCG: 50 INJECTION INTRAMUSCULAR; INTRAVENOUS at 07:55

## 2020-10-09 NOTE — ANESTHESIA PREPROCEDURE EVALUATION
Relevant Problems   No relevant active problems       Anesthetic History   No history of anesthetic complications            Review of Systems / Medical History  Patient summary reviewed and pertinent labs reviewed    Pulmonary  Within defined limits                 Neuro/Psych       CVA: no residual symptoms  Psychiatric history    Comments: Hx of depression Cardiovascular    Hypertension              Exercise tolerance: <4 METS     GI/Hepatic/Renal     GERD           Endo/Other    Diabetes: type 2    Morbid obesity and arthritis    Comments: HgbA1C 7.5--no meds Other Findings            Physical Exam    Airway  Mallampati: II  TM Distance: > 6 cm  Neck ROM: normal range of motion   Mouth opening: Normal     Cardiovascular    Rhythm: regular           Dental    Dentition: Full lower dentures and Full upper dentures     Pulmonary                 Abdominal  GI exam deferred       Other Findings            Anesthetic Plan    ASA: 3  Anesthesia type: general          Induction: Intravenous  Anesthetic plan and risks discussed with: Patient

## 2020-10-09 NOTE — ANESTHESIA POSTPROCEDURE EVALUATION
Procedure(s):  KYPHOPLASTY T11.    general    Anesthesia Post Evaluation      Multimodal analgesia: multimodal analgesia used between 6 hours prior to anesthesia start to PACU discharge  Patient location during evaluation: PACU  Patient participation: complete - patient participated  Level of consciousness: awake  Pain management: adequate  Airway patency: patent  Anesthetic complications: no  Cardiovascular status: acceptable  Respiratory status: acceptable  Hydration status: acceptable  Post anesthesia nausea and vomiting:  none  Final Post Anesthesia Temperature Assessment:  Normothermia (36.0-37.5 degrees C)      INITIAL Post-op Vital signs:   Vitals Value Taken Time   /77 10/9/2020  9:16 AM   Temp 36.6 °C (97.9 °F) 10/9/2020  8:44 AM   Pulse 77 10/9/2020  9:16 AM   Resp 14 10/9/2020  9:16 AM   SpO2 93 % 10/9/2020  9:16 AM

## 2020-10-09 NOTE — DISCHARGE INSTRUCTIONS
KYPHOPLASTY DISCHARGE INSTRUCTIONS    General Information: This procedure is done to help with the back pain that is associated with compression fractures in the spine. The kyphoplasty involves placing a balloon into the space of the vertebrae that is fractured, blowing up the balloon, therefore realigning the broken pieces of bone, and then injecting cement into the space to strengthen the vertebrae. The pain experienced from compression fractures is caused by the vertebrae not being stabilized. The cement stabilizes the bone, therefore reducing the pain. Home Care Instructions: You can resume your regular diet and medication regimen. Do not drink alcohol, drive, or make any important legal decisions in the next 24 hours. Do not lift anything heavier than a gallon of milk, or do anything strenuous for the next 24 hours. You will notice a dressing on your lower back after your procedure. This dressing can be removed in 24 hours. Showering is acceptable in 24 hours, but you should refrain from tub baths or swimming for 5 days. Call If:     You should call your Physician if you have any bleeding other than a small spot on your bandage. Call if you have any signs of infection, fever, or increased pain at the site. Call if you should have new or worsening pain in your back, or if you lose control of your bladder or bowel. Any tingling or loss of feeling or movement in your legs should also be reported. Follow-Up Instructions: Please see your ordering doctor as he has requested. To Reach Us:  [address] [facility] [phone]      ACTIVITY  · As tolerated and as directed by your doctor. · Bathe or shower as directed by your doctor. DIET  · Clear liquids until no nausea or vomiting; then light diet for the first day. · Advance to regular diet on second day, unless your doctor orders otherwise. · If nausea and vomiting continues, call your doctor.      PAIN  · Take pain medication as directed by your doctor. · Call your doctor if pain is NOT relieved by medication. · DO NOT take aspirin of blood thinners unless directed by your doctor. CALL YOUR DOCTOR IF   · Excessive bleeding that does not stop after holding pressure over the area  · Temperature of 101 degrees F or above  · Excessive redness, swelling or bruising, and/ or green or yellow, smelly discharge from incision    AFTER ANESTHESIA   · For the first 24 hours: DO NOT Drive, Drink alcoholic beverages, or Make important decisions. · Be aware of dizziness following anesthesia and while taking pain medication. After general anesthesia or intravenous sedation, for 24 hours or while taking prescription Narcotics:  · Limit your activities  · A responsible adult needs to be with you for the next 24 hours  · Do not drive and operate hazardous machinery  · Do not make important personal or business decisions  · Do not drink alcoholic beverages  · If you have not urinated within 8 hours after discharge, and are having issues related to urinary retention, please go to the nearest ER. · If you have sleep apnea and have a CPAP machine, please use it for all naps and sleeping. · Please use caution when taking narcotics and any of your home medications that may cause drowsiness. *  Please give a list of your current medications to your Primary Care Provider. *  Please update this list whenever your medications are discontinued, doses are      changed, or new medications (including over-the-counter products) are added. *  Please carry medication information at all times in case of emergency situations. These are general instructions for a healthy lifestyle:  No smoking/ No tobacco products/ Avoid exposure to second hand smoke  Surgeon General's Warning:  Quitting smoking now greatly reduces serious risk to your health.   Obesity, smoking, and sedentary lifestyle greatly increases your risk for illness  A healthy diet, regular physical exercise & weight monitoring are important for maintaining a healthy lifestyle    You may be retaining fluid if you have a history of heart failure or if you experience any of the following symptoms:  Weight gain of 3 pounds or more overnight or 5 pounds in a week, increased swelling in our hands or feet or shortness of breath while lying flat in bed. Please call your doctor as soon as you notice any of these symptoms; do not wait until your next office visit.

## 2020-10-09 NOTE — BRIEF OP NOTE
Brief Postoperative Note    Patient: Abe Ayala  YOB: 1935  MRN: [id number]    Date of Procedure: 10/9/2020     Pre-Op Diagnosis: Age-related osteoporosis with current pathological fracture of vertebra, initial encounter ([address] [M80.08XA]    Post-Op Diagnosis: T11 fracture    Procedure(s):  KYPHOPLASTY T11    Surgeon(s):  Liborio Fischer MD    Surgical Assistant: None    Anesthesia: General     Estimated Blood Loss (mL): minimal    Complications: none    Specimens:   ID Type Source Tests Collected by Time Destination   1 : T11 Bone biopsy Preservative Bone  Liborio Fischer MD 10/9/2020 0820 Pathology        Implants: * No implants in log *    Drains: * No LDAs found *    Findings: fracture    Electronically Signed by Griffin Don MD on 10/9/2020 at 8:28 AM

## 2020-10-09 NOTE — OP NOTES
[facility]  OPERATIVE REPORT    Name:  Thania Gutierrez  MR#:  [id number]  :  1935  ACCOUNT #:  [de-identified]  DATE OF SERVICE:  10/09/2020    PREOPERATIVE DIAGNOSIS:  T11 compression fracture. POSTOPERATIVE DIAGNOSIS:  T11 compression fracture. PROCEDURE PERFORMED:  Bilateral T11 kyphoplasty with bone biopsy and procedure was carried out using biplanar C-arm fluoroscopy imaging. SURGEON:  Raulito Wallace MD    ASSISTANT:  None. ANESTHESIA:  GETA. COMPLICATIONS:  None. SPECIMENS REMOVED:  T11 bone biopsy sent to Pathology. IMPLANTS:  None. ESTIMATED BLOOD LOSS:  Minimal.    FLUIDS:  200 mL crystalloid    DRAINS:  None. INDICATIONS:  The patient is an 59-year-old female who had sustained a T11 fracture and has had intractable pain and is requiring narcotic medication and the pain has not improved over time, so she is here for surgical treatment. PROCEDURE:  The patient was brought to the operating room. After administration of anesthesia, IV antibiotic, and placement of monitoring lines, she was positioned prone on the Walter Jock frame. Her back was prepped with Betadine, sterilely draped, and at this point, surgeon called a time-out and confirmed the procedure to be performed, her allergy history, and the fact she had received preoperative IV antibiotics. AP and lateral C-arms were brought in. We lined up the T11 vertebral body on both views and marked the lateral aspect of the pedicle on the skin bilaterally. We made a stab wound about a fingerbreadth lateral to the josias bilaterally and inserted our starting trocars down to the lateral edge of the pedicle. Then checking on both AP and lateral views, we inserted the trocars down to the pedicle being careful not to breech the medial cortex and anchored them into the posterior aspect of the vertebral body. We obtained a bone biopsy from the left side and drilled towards the anterior cortex bilaterally.   We inserted our inflatable tamps and inflated, and during this time, the methyl methacrylate was reconstituted and placed into the insertion devices. We then deflated the tamps, removed them, and inserted the insertion devices. We compressed the methyl methacrylate out into the void created by the bone tamps. We got good filling top to bottom, front to back, and side to side without any extravasation. The compression was mainly at the inferior endplate and this only changed slightly. Once the cement had hardened, we removed the bone fillers and checked for tails and none were seen, so the trocar sleeves were removed. A final AP and lateral C-arm x-ray was obtained. We anesthetized the skin and subcutaneous tissue with 20 mL of 0.5% Marcaine with epinephrine. The skin incision was then closed with a single stitch of 3-0 nylon. Dry sterile dressings were applied and the patient was rolled supine onto the recovery room bed.       Mary Gibson MD      [id number]  D:  10/09/2020 8:45  T:  10/09/2020 11:51  JOB #:  [id number]  CC:  Cecilia Solis MD

## 2021-05-05 ENCOUNTER — HOME HEALTH ADMISSION (OUTPATIENT)
Dept: HOME HEALTH SERVICES | Facility: HOME HEALTH | Age: 86
End: 2021-05-05
Payer: MEDICARE

## 2021-05-07 ENCOUNTER — HOME CARE VISIT (OUTPATIENT)
Dept: SCHEDULING | Facility: HOME HEALTH | Age: 86
End: 2021-05-07
Payer: MEDICARE

## 2021-05-07 VITALS
TEMPERATURE: 97.8 F | DIASTOLIC BLOOD PRESSURE: 70 MMHG | HEART RATE: 68 BPM | RESPIRATION RATE: 18 BRPM | SYSTOLIC BLOOD PRESSURE: 155 MMHG

## 2021-05-07 PROCEDURE — 3331090001 HH PPS REVENUE CREDIT

## 2021-05-07 PROCEDURE — 3331090002 HH PPS REVENUE DEBIT

## 2021-05-07 PROCEDURE — G0151 HHCP-SERV OF PT,EA 15 MIN: HCPCS

## 2021-05-07 PROCEDURE — 400018 HH-NO PAY CLAIM PROCEDURE

## 2021-05-07 PROCEDURE — 400013 HH SOC

## 2021-05-08 PROCEDURE — 3331090001 HH PPS REVENUE CREDIT

## 2021-05-08 PROCEDURE — 3331090002 HH PPS REVENUE DEBIT

## 2021-05-09 PROCEDURE — 3331090001 HH PPS REVENUE CREDIT

## 2021-05-09 PROCEDURE — 3331090002 HH PPS REVENUE DEBIT

## 2021-05-10 ENCOUNTER — HOME CARE VISIT (OUTPATIENT)
Dept: SCHEDULING | Facility: HOME HEALTH | Age: 86
End: 2021-05-10
Payer: MEDICARE

## 2021-05-10 PROCEDURE — 3331090001 HH PPS REVENUE CREDIT

## 2021-05-10 PROCEDURE — 3331090002 HH PPS REVENUE DEBIT

## 2021-05-10 PROCEDURE — G0151 HHCP-SERV OF PT,EA 15 MIN: HCPCS

## 2021-05-11 ENCOUNTER — HOME CARE VISIT (OUTPATIENT)
Dept: SCHEDULING | Facility: HOME HEALTH | Age: 86
End: 2021-05-11
Payer: MEDICARE

## 2021-05-11 VITALS
RESPIRATION RATE: 18 BRPM | TEMPERATURE: 97.3 F | SYSTOLIC BLOOD PRESSURE: 142 MMHG | HEART RATE: 66 BPM | DIASTOLIC BLOOD PRESSURE: 80 MMHG | OXYGEN SATURATION: 96 %

## 2021-05-11 PROCEDURE — 3331090002 HH PPS REVENUE DEBIT

## 2021-05-11 PROCEDURE — G0299 HHS/HOSPICE OF RN EA 15 MIN: HCPCS

## 2021-05-11 PROCEDURE — 3331090001 HH PPS REVENUE CREDIT

## 2021-05-12 VITALS
TEMPERATURE: 97.3 F | RESPIRATION RATE: 17 BRPM | SYSTOLIC BLOOD PRESSURE: 124 MMHG | HEART RATE: 78 BPM | DIASTOLIC BLOOD PRESSURE: 76 MMHG

## 2021-05-12 PROCEDURE — 3331090001 HH PPS REVENUE CREDIT

## 2021-05-12 PROCEDURE — 3331090002 HH PPS REVENUE DEBIT

## 2021-05-13 ENCOUNTER — HOME CARE VISIT (OUTPATIENT)
Dept: SCHEDULING | Facility: HOME HEALTH | Age: 86
End: 2021-05-13
Payer: MEDICARE

## 2021-05-13 VITALS
TEMPERATURE: 97.4 F | RESPIRATION RATE: 18 BRPM | DIASTOLIC BLOOD PRESSURE: 76 MMHG | OXYGEN SATURATION: 97 % | HEART RATE: 72 BPM | SYSTOLIC BLOOD PRESSURE: 132 MMHG

## 2021-05-13 PROCEDURE — G0299 HHS/HOSPICE OF RN EA 15 MIN: HCPCS

## 2021-05-13 PROCEDURE — 3331090001 HH PPS REVENUE CREDIT

## 2021-05-13 PROCEDURE — 3331090002 HH PPS REVENUE DEBIT

## 2021-05-14 ENCOUNTER — HOME CARE VISIT (OUTPATIENT)
Dept: SCHEDULING | Facility: HOME HEALTH | Age: 86
End: 2021-05-14
Payer: MEDICARE

## 2021-05-14 PROCEDURE — 3331090002 HH PPS REVENUE DEBIT

## 2021-05-14 PROCEDURE — 3331090001 HH PPS REVENUE CREDIT

## 2021-05-14 PROCEDURE — G0157 HHC PT ASSISTANT EA 15: HCPCS

## 2021-05-15 PROCEDURE — 3331090002 HH PPS REVENUE DEBIT

## 2021-05-15 PROCEDURE — 3331090001 HH PPS REVENUE CREDIT

## 2021-05-16 VITALS
DIASTOLIC BLOOD PRESSURE: 62 MMHG | RESPIRATION RATE: 17 BRPM | HEART RATE: 88 BPM | TEMPERATURE: 97.7 F | SYSTOLIC BLOOD PRESSURE: 118 MMHG

## 2021-05-16 PROCEDURE — 3331090002 HH PPS REVENUE DEBIT

## 2021-05-16 PROCEDURE — 3331090001 HH PPS REVENUE CREDIT

## 2021-05-17 ENCOUNTER — HOME CARE VISIT (OUTPATIENT)
Dept: SCHEDULING | Facility: HOME HEALTH | Age: 86
End: 2021-05-17
Payer: MEDICARE

## 2021-05-17 PROCEDURE — 3331090001 HH PPS REVENUE CREDIT

## 2021-05-17 PROCEDURE — G0157 HHC PT ASSISTANT EA 15: HCPCS

## 2021-05-17 PROCEDURE — 3331090002 HH PPS REVENUE DEBIT

## 2021-05-18 ENCOUNTER — HOME CARE VISIT (OUTPATIENT)
Dept: SCHEDULING | Facility: HOME HEALTH | Age: 86
End: 2021-05-18
Payer: MEDICARE

## 2021-05-18 VITALS
TEMPERATURE: 97.7 F | DIASTOLIC BLOOD PRESSURE: 68 MMHG | RESPIRATION RATE: 17 BRPM | SYSTOLIC BLOOD PRESSURE: 120 MMHG | HEART RATE: 80 BPM

## 2021-05-18 VITALS
RESPIRATION RATE: 18 BRPM | HEART RATE: 68 BPM | DIASTOLIC BLOOD PRESSURE: 82 MMHG | TEMPERATURE: 97.1 F | SYSTOLIC BLOOD PRESSURE: 138 MMHG | OXYGEN SATURATION: 95 %

## 2021-05-18 PROCEDURE — 3331090002 HH PPS REVENUE DEBIT

## 2021-05-18 PROCEDURE — 3331090001 HH PPS REVENUE CREDIT

## 2021-05-18 PROCEDURE — G0299 HHS/HOSPICE OF RN EA 15 MIN: HCPCS

## 2021-05-19 PROCEDURE — 3331090001 HH PPS REVENUE CREDIT

## 2021-05-19 PROCEDURE — 3331090002 HH PPS REVENUE DEBIT

## 2021-05-20 ENCOUNTER — HOME CARE VISIT (OUTPATIENT)
Dept: SCHEDULING | Facility: HOME HEALTH | Age: 86
End: 2021-05-20
Payer: MEDICARE

## 2021-05-20 VITALS
SYSTOLIC BLOOD PRESSURE: 122 MMHG | HEART RATE: 88 BPM | TEMPERATURE: 98 F | DIASTOLIC BLOOD PRESSURE: 62 MMHG | RESPIRATION RATE: 16 BRPM

## 2021-05-20 PROCEDURE — 3331090001 HH PPS REVENUE CREDIT

## 2021-05-20 PROCEDURE — 3331090002 HH PPS REVENUE DEBIT

## 2021-05-20 PROCEDURE — G0157 HHC PT ASSISTANT EA 15: HCPCS

## 2021-05-21 PROCEDURE — 3331090002 HH PPS REVENUE DEBIT

## 2021-05-21 PROCEDURE — 3331090001 HH PPS REVENUE CREDIT

## 2021-05-22 PROCEDURE — 3331090002 HH PPS REVENUE DEBIT

## 2021-05-22 PROCEDURE — 3331090001 HH PPS REVENUE CREDIT

## 2021-05-23 PROCEDURE — 3331090002 HH PPS REVENUE DEBIT

## 2021-05-23 PROCEDURE — 3331090001 HH PPS REVENUE CREDIT

## 2021-05-24 PROCEDURE — 3331090001 HH PPS REVENUE CREDIT

## 2021-05-24 PROCEDURE — 3331090002 HH PPS REVENUE DEBIT

## 2021-05-25 ENCOUNTER — HOME CARE VISIT (OUTPATIENT)
Dept: HOME HEALTH SERVICES | Facility: HOME HEALTH | Age: 86
End: 2021-05-25
Payer: MEDICARE

## 2021-05-25 ENCOUNTER — HOME CARE VISIT (OUTPATIENT)
Dept: SCHEDULING | Facility: HOME HEALTH | Age: 86
End: 2021-05-25
Payer: MEDICARE

## 2021-05-25 VITALS
DIASTOLIC BLOOD PRESSURE: 64 MMHG | TEMPERATURE: 97.6 F | HEART RATE: 88 BPM | RESPIRATION RATE: 17 BRPM | SYSTOLIC BLOOD PRESSURE: 142 MMHG

## 2021-05-25 PROCEDURE — G0157 HHC PT ASSISTANT EA 15: HCPCS

## 2021-05-25 PROCEDURE — 3331090001 HH PPS REVENUE CREDIT

## 2021-05-25 PROCEDURE — 3331090002 HH PPS REVENUE DEBIT

## 2021-05-26 PROCEDURE — 3331090002 HH PPS REVENUE DEBIT

## 2021-05-26 PROCEDURE — 3331090001 HH PPS REVENUE CREDIT

## 2021-05-27 ENCOUNTER — HOME CARE VISIT (OUTPATIENT)
Dept: SCHEDULING | Facility: HOME HEALTH | Age: 86
End: 2021-05-27
Payer: MEDICARE

## 2021-05-27 VITALS
SYSTOLIC BLOOD PRESSURE: 130 MMHG | DIASTOLIC BLOOD PRESSURE: 78 MMHG | TEMPERATURE: 97.7 F | RESPIRATION RATE: 18 BRPM | HEART RATE: 78 BPM

## 2021-05-27 VITALS
DIASTOLIC BLOOD PRESSURE: 72 MMHG | RESPIRATION RATE: 18 BRPM | SYSTOLIC BLOOD PRESSURE: 128 MMHG | TEMPERATURE: 97.1 F | HEART RATE: 72 BPM | OXYGEN SATURATION: 96 %

## 2021-05-27 PROCEDURE — 3331090002 HH PPS REVENUE DEBIT

## 2021-05-27 PROCEDURE — G0299 HHS/HOSPICE OF RN EA 15 MIN: HCPCS

## 2021-05-27 PROCEDURE — G0151 HHCP-SERV OF PT,EA 15 MIN: HCPCS

## 2021-05-27 PROCEDURE — 3331090001 HH PPS REVENUE CREDIT

## 2021-05-28 PROCEDURE — 3331090001 HH PPS REVENUE CREDIT

## 2021-05-28 PROCEDURE — 3331090002 HH PPS REVENUE DEBIT

## 2021-05-29 PROCEDURE — 3331090002 HH PPS REVENUE DEBIT

## 2021-05-29 PROCEDURE — 3331090001 HH PPS REVENUE CREDIT

## 2021-05-30 PROCEDURE — 3331090002 HH PPS REVENUE DEBIT

## 2021-05-30 PROCEDURE — 3331090001 HH PPS REVENUE CREDIT

## 2021-05-31 PROCEDURE — 3331090002 HH PPS REVENUE DEBIT

## 2021-05-31 PROCEDURE — 3331090001 HH PPS REVENUE CREDIT

## 2022-03-18 PROBLEM — Z17.1 MALIGNANT NEOPLASM OF OVERLAPPING SITES OF LEFT BREAST IN FEMALE, ESTROGEN RECEPTOR NEGATIVE (HCC): Status: ACTIVE | Noted: 2019-01-20

## 2022-03-18 PROBLEM — F33.9 RECURRENT DEPRESSION (HCC): Status: ACTIVE | Noted: 2018-01-03

## 2022-03-18 PROBLEM — C50.812 MALIGNANT NEOPLASM OF OVERLAPPING SITES OF LEFT BREAST IN FEMALE, ESTROGEN RECEPTOR NEGATIVE (HCC): Status: ACTIVE | Noted: 2019-01-20

## 2022-03-19 PROBLEM — M80.08XA VERTEBRAL FRACTURE, OSTEOPOROTIC (HCC): Status: ACTIVE | Noted: 2020-10-09

## 2022-03-19 PROBLEM — E66.01 SEVERE OBESITY (BMI 35.0-39.9) WITH COMORBIDITY (HCC): Status: ACTIVE | Noted: 2018-05-24

## 2022-03-19 PROBLEM — R73.9 HYPERGLYCEMIA: Status: ACTIVE | Noted: 2017-04-04

## 2022-05-03 PROBLEM — E11.9 TYPE 2 DIABETES MELLITUS (HCC): Status: ACTIVE | Noted: 2022-05-03

## 2022-06-06 ENCOUNTER — NURSE ONLY (OUTPATIENT)
Dept: INTERNAL MEDICINE CLINIC | Facility: CLINIC | Age: 87
End: 2022-06-06
Payer: MEDICARE

## 2022-06-06 DIAGNOSIS — M81.0 OSTEOPOROSIS, UNSPECIFIED OSTEOPOROSIS TYPE, UNSPECIFIED PATHOLOGICAL FRACTURE PRESENCE: Primary | ICD-10-CM

## 2022-06-06 PROCEDURE — 96372 THER/PROPH/DIAG INJ SC/IM: CPT | Performed by: INTERNAL MEDICINE

## 2022-06-06 NOTE — PROGRESS NOTES
Prolia 60 mg/ml was injected SC  into right arm today. The patient tolerated the injection well. The patient was advised to call with any problems post injection.

## 2022-07-19 RX ORDER — HYDROCHLOROTHIAZIDE 25 MG/1
TABLET ORAL
Qty: 90 TABLET | Refills: 3 | Status: SHIPPED | OUTPATIENT
Start: 2022-07-19

## 2022-07-19 NOTE — TELEPHONE ENCOUNTER
Medication Refill Request      Name of Medication : hydrochlorothiazide      Strength of Medication: 25 mg      Directions: 1 daily      30 day or 90 day supply: 90      Which Pharmacy:[facility]        Medication Refill Request      Name of Medication : triamcinolone acetonide      Strength of Medication: 0.1 ointment      Directions: as needed for itching      30 day or 90 day supply: 30      Which [facility]acy:[facility]

## 2022-07-21 NOTE — TELEPHONE ENCOUNTER
Medication Refill Request      Name of Medication : Paroxetine      Strength of Medication: 20mg      Directions:       30 day or 90 day supply: 90      Which Pharmacy: [facility]

## 2022-07-22 RX ORDER — PAROXETINE HYDROCHLORIDE 20 MG/1
TABLET, FILM COATED ORAL
Qty: 90 TABLET | Refills: 3 | Status: SHIPPED | OUTPATIENT
Start: 2022-07-22

## 2022-07-25 RX ORDER — SITAGLIPTIN 100 MG/1
TABLET, FILM COATED ORAL
Qty: 90 TABLET | Refills: 3 | Status: SHIPPED | OUTPATIENT
Start: 2022-07-25 | End: 2022-10-20

## 2022-07-29 ENCOUNTER — OFFICE VISIT (OUTPATIENT)
Dept: INTERNAL MEDICINE CLINIC | Facility: CLINIC | Age: 87
End: 2022-07-29
Payer: MEDICARE

## 2022-07-29 VITALS
SYSTOLIC BLOOD PRESSURE: 145 MMHG | WEIGHT: 150.4 LBS | BODY MASS INDEX: 30.32 KG/M2 | HEART RATE: 61 BPM | HEIGHT: 59 IN | TEMPERATURE: 97.2 F | DIASTOLIC BLOOD PRESSURE: 61 MMHG | RESPIRATION RATE: 16 BRPM

## 2022-07-29 DIAGNOSIS — Z00.00 MEDICARE ANNUAL WELLNESS VISIT, SUBSEQUENT: ICD-10-CM

## 2022-07-29 DIAGNOSIS — F33.9 RECURRENT DEPRESSION (HCC): ICD-10-CM

## 2022-07-29 DIAGNOSIS — R73.9 HYPERGLYCEMIA: ICD-10-CM

## 2022-07-29 DIAGNOSIS — E11.69 TYPE 2 DIABETES MELLITUS WITH OTHER SPECIFIED COMPLICATION, WITHOUT LONG-TERM CURRENT USE OF INSULIN (HCC): Primary | ICD-10-CM

## 2022-07-29 DIAGNOSIS — R53.83 FATIGUE, UNSPECIFIED TYPE: ICD-10-CM

## 2022-07-29 DIAGNOSIS — M54.6 THORACIC BACK PAIN, UNSPECIFIED BACK PAIN LATERALITY, UNSPECIFIED CHRONICITY: ICD-10-CM

## 2022-07-29 DIAGNOSIS — I10 ESSENTIAL HYPERTENSION, BENIGN: ICD-10-CM

## 2022-07-29 DIAGNOSIS — E78.2 MIXED HYPERLIPIDEMIA: ICD-10-CM

## 2022-07-29 PROBLEM — M80.08XA VERTEBRAL FRACTURE, OSTEOPOROTIC (HCC): Status: RESOLVED | Noted: 2020-10-09 | Resolved: 2022-07-29

## 2022-07-29 LAB
ALT SERPL-CCNC: 28 U/L (ref 12–65)
ANION GAP SERPL CALC-SCNC: 5 MMOL/L (ref 7–16)
BUN SERPL-MCNC: 12 MG/DL (ref 8–23)
CALCIUM SERPL-MCNC: 10.1 MG/DL (ref 8.3–10.4)
CHLORIDE SERPL-SCNC: 106 MMOL/L (ref 98–107)
CHOLEST SERPL-MCNC: 134 MG/DL
CO2 SERPL-SCNC: 28 MMOL/L (ref 21–32)
CREAT SERPL-MCNC: 0.6 MG/DL (ref 0.6–1)
GLUCOSE SERPL-MCNC: 113 MG/DL (ref 65–100)
HDLC SERPL-MCNC: 58 MG/DL (ref 40–60)
HDLC SERPL: 2.3 {RATIO}
LDLC SERPL CALC-MCNC: 62 MG/DL
POTASSIUM SERPL-SCNC: 4.4 MMOL/L (ref 3.5–5.1)
SODIUM SERPL-SCNC: 139 MMOL/L (ref 136–145)
TRIGL SERPL-MCNC: 70 MG/DL (ref 35–150)
VLDLC SERPL CALC-MCNC: 14 MG/DL (ref 6–23)

## 2022-07-29 PROCEDURE — 1036F TOBACCO NON-USER: CPT | Performed by: INTERNAL MEDICINE

## 2022-07-29 PROCEDURE — 1090F PRES/ABSN URINE INCON ASSESS: CPT | Performed by: INTERNAL MEDICINE

## 2022-07-29 PROCEDURE — G8417 CALC BMI ABV UP PARAM F/U: HCPCS | Performed by: INTERNAL MEDICINE

## 2022-07-29 PROCEDURE — 99213 OFFICE O/P EST LOW 20 MIN: CPT | Performed by: INTERNAL MEDICINE

## 2022-07-29 PROCEDURE — 3044F HG A1C LEVEL LT 7.0%: CPT | Performed by: INTERNAL MEDICINE

## 2022-07-29 PROCEDURE — 1123F ACP DISCUSS/DSCN MKR DOCD: CPT | Performed by: INTERNAL MEDICINE

## 2022-07-29 PROCEDURE — G8427 DOCREV CUR MEDS BY ELIG CLIN: HCPCS | Performed by: INTERNAL MEDICINE

## 2022-07-29 PROCEDURE — G0402 INITIAL PREVENTIVE EXAM: HCPCS | Performed by: INTERNAL MEDICINE

## 2022-07-29 SDOH — ECONOMIC STABILITY: FOOD INSECURITY: WITHIN THE PAST 12 MONTHS, THE FOOD YOU BOUGHT JUST DIDN'T LAST AND YOU DIDN'T HAVE MONEY TO GET MORE.: NEVER TRUE

## 2022-07-29 SDOH — ECONOMIC STABILITY: FOOD INSECURITY: WITHIN THE PAST 12 MONTHS, YOU WORRIED THAT YOUR FOOD WOULD RUN OUT BEFORE YOU GOT MONEY TO BUY MORE.: NEVER TRUE

## 2022-07-29 ASSESSMENT — ENCOUNTER SYMPTOMS
BACK PAIN: 0
SHORTNESS OF BREATH: 0
ABDOMINAL PAIN: 0

## 2022-07-29 ASSESSMENT — SOCIAL DETERMINANTS OF HEALTH (SDOH): HOW HARD IS IT FOR YOU TO PAY FOR THE VERY BASICS LIKE FOOD, HOUSING, MEDICAL CARE, AND HEATING?: NOT HARD AT ALL

## 2022-07-29 ASSESSMENT — LIFESTYLE VARIABLES
HOW OFTEN DO YOU HAVE A DRINK CONTAINING ALCOHOL: NEVER
HOW MANY STANDARD DRINKS CONTAINING ALCOHOL DO YOU HAVE ON A TYPICAL DAY: PATIENT DOES NOT DRINK

## 2022-07-29 NOTE — PROGRESS NOTES
7/29/2022 2:21 PM  Location:[facility] [address] [facility]  [place]  Patient #:  [id number]  YOB: 1935          YOUR LAST HEMOGLOBIN A1CS:   No results found for: HBA1C, [id number]    YOUR LAST LIPID PROFILE:   Lab Results   Component Value Date/Time    CHOL 143 05/05/2021 03:06 PM    HDL 51 05/05/2021 03:06 PM    VLDL 29 05/05/2021 03:06 PM         Lab Results   Component Value Date/Time    GFRAA 96 01/28/2022 02:12 PM    BUN 10 01/28/2022 02:12 PM     01/28/2022 02:12 PM    K 4.8 01/28/2022 02:12 PM    CL 99 01/28/2022 02:12 PM    CO2 26 01/28/2022 02:12 PM           History of Present Illness     Chief Complaint   Patient presents with    Medicare AWV     subsequent    6 Month Follow-Up     Pt presents to the office today for a 6 month follow-up      Diabetes    Skin Problem     Have some spots on her back she would like the doctor to check    Back Pain     When bending over she starts having pain in the mid section of her back; only hurts when bending over to do things like get dishes out of      This patient continues to have problems with back pain especially when leaning forward. She has a known history of thoracic compression fractures and is on Prolia. She otherwise remains very active. She is noted skin lesions on her mid back that she is concerned about that is slightly pruritic. She states that her blood pressures controlled she is tolerating her other medicines well    Ms. Cole Jimenes is a 80 y.o. female  who presents for office visit      Allergies   Allergen Reactions    Alendronate Itching    Aspirin Other (See Comments)     Pt reports she can not take due to internal bleeding     Naproxen Sodium Nausea Only     Past Medical History:   Diagnosis Date    Acute bronchitis     Anxiety state, unspecified     Arthritis     Cancer ([facility] [address] 12/13    left breast    Chest pain, unspecified     Chronic pain     right shoulder, back     Depression Enthesopathy of unspecified site 5/13/2015    Essential hypertension, benign 5/13/2015    GERD (gastroesophageal reflux disease)     no longer has    GI bleed      - during hospital stay had 3 units blood     Hypercholesterolemia     Hypertension     controlled with medication    Ill-defined condition     pt reports her \"knees buckle\" a lot     Impaired fasting glucose 5/13/2015    Incarcerated incisional hernia 12/25/2017 1/30/18 s/p open ventral hernia repair with small circular ventralex mesh; Dr Jackson Medic    Loss of height     Malignant neoplasm of breast (female), unspecified site     Mixed incontinence urge and stress (male)(female)     Other and unspecified hyperlipidemia 5/13/2015    Other unspecified back disorder     Overweight(278.02)     Stroke ([address]     TIA - no effects 2016    Unspecified asthma(493.90)     Unspecified hemorrhoids without mention of complication 1/27/5889    Unspecified menopausal and postmenopausal disorder     Upper GI bleed 12/22/2017     Social History     Socioeconomic History    Marital status:       Spouse name: None    Number of children: None    Years of education: None    Highest education level: None   Tobacco Use    Smoking status: Never    Smokeless tobacco: Never   Substance and Sexual Activity    Alcohol use: No    Drug use: No     Social Determinants of Health     Financial Resource Strain: Low Risk     Difficulty of Paying Living Expenses: Not hard at all   Food Insecurity: No Food Insecurity    Worried About Running Out of Food in the Last Year: Never true    Ran Out of Food in the Last Year: Never true   Physical Activity: Inactive    Days of Exercise per Week: 0 days    Minutes of Exercise per Session: 0 min     Past Surgical History:   Procedure Laterality Date    BREAST LUMPECTOMY  1/24/2014    LEFT BREAST NEEDLE LOCALIZED MASS EXCISION     performed by Adilson Grajeda MD at [address] [place] [address] (CERVIX STATUS UNKNOWN)  1970s    [place] ABDOMEN SURGERY PROC UNLISTED      esophagus to correct GERD unsure of procedure name     Current Outpatient Medications   Medication Sig Dispense Refill    JANUVIA 100 MG tablet TAKE 1 TABLET EVERY DAY 90 tablet 3    PARoxetine (PAXIL) 20 MG tablet TAKE 1 TABLET EVERY DAY 90 tablet 3    hydroCHLOROthiazide (HYDRODIURIL) 25 MG tablet TAKE 1 TABLET EVERY DAY 90 tablet 3    amLODIPine (NORVASC) 5 MG tablet TAKE 1 TABLET EVERY DAY      atorvastatin (LIPITOR) 40 MG tablet Take 40 mg by mouth in the morning. calcium carbonate 1500 (600 Ca) MG TABS tablet Take 1,200 mg by mouth daily      vitamin D (CHOLECALCIFEROL) 25 MCG (1000 UT) TABS tablet Take 1,000 Units by mouth daily      denosumab (PROLIA) 60 MG/ML SOSY SC injection Inject 60 mg into the skin every 6 months      loratadine (CLARITIN) 10 MG tablet Take 10 mg by mouth daily      potassium chloride (KLOR-CON M) 20 MEQ extended release tablet Take 20 mEq by mouth daily       No current facility-administered medications for this visit. Health Maintenance   Topic Date Due    DTaP/Tdap/Td vaccine (1 - Tdap) Never done    COVID-19 Vaccine (4 - Booster for Moderna series) 04/07/2022    Lipids  05/05/2022    Flu vaccine (1) 09/01/2022    Annual Wellness Visit (AWV)  01/29/2023    Depression Monitoring  05/03/2023    Shingles vaccine  Completed    Pneumococcal 65+ years Vaccine  Completed    Hepatitis A vaccine  Aged Out    Hib vaccine  Aged Out    Meningococcal (ACWY) vaccine  Aged Out     Family History   Problem Relation Age of Onset    Cancer Maternal Grandmother         breast at old age    Lung Disease Mother     Alzheimer's Disease Mother     Cancer Father     Diabetes Brother     Lung Disease Brother     Alcohol Abuse Brother              Review of Systems  Review of Systems   Constitutional:  Positive for fatigue. Negative for chills and fever. Respiratory:  Negative for shortness of breath.     Cardiovascular: Negative for chest pain, palpitations and leg swelling. Gastrointestinal:  Negative for abdominal pain. Genitourinary:  Negative for difficulty urinating. Musculoskeletal:  Negative for arthralgias and back pain. Skin:  Negative for rash. Neurological:  Negative for dizziness, syncope, weakness and headaches. BP (!) 145/61 (Site: Left Upper Arm, Position: Sitting, Cuff Size: Large Adult)   Pulse 61   Temp 97.2 °F (36.2 °C) (Temporal)   Resp 16   Ht 4' 11.25\" (1.505 m)   Wt 150 lb 6.4 oz (68.2 kg)   BMI 30.12 kg/m²       Physical Exam    Physical Exam  Constitutional:       Appearance: Normal appearance. HENT:      Head: Normocephalic and atraumatic. Eyes:      Extraocular Movements: Extraocular movements intact. Pupils: Pupils are equal, round, and reactive to light. Cardiovascular:      Rate and Rhythm: Normal rate and regular rhythm. Heart sounds: Normal heart sounds. No murmur heard. Pulmonary:      Effort: Pulmonary effort is normal.      Breath sounds: Normal breath sounds. Skin:     General: Skin is warm and dry. Comments: Small flat light brown keratotic lesions on the low back   Neurological:      General: No focal deficit present. Mental Status: She is alert and oriented to person, place, and time. Psychiatric:         Mood and Affect: Mood normal.         Behavior: Behavior normal.         Thought Content: Thought content normal.         Judgment: Judgment normal.       Assessment & Plan    Encounter Diagnoses   Name Primary?     Type 2 diabetes mellitus with other specified complication, without long-term current use of insulin (HCC) Yes       Current Outpatient Medications   Medication Sig Dispense Refill    JANUVIA 100 MG tablet TAKE 1 TABLET EVERY DAY 90 tablet 3    PARoxetine (PAXIL) 20 MG tablet TAKE 1 TABLET EVERY DAY 90 tablet 3    hydroCHLOROthiazide (HYDRODIURIL) 25 MG tablet TAKE 1 TABLET EVERY DAY 90 tablet 3    amLODIPine (NORVASC) 5 MG tablet TAKE 1 TABLET EVERY DAY      atorvastatin (LIPITOR) 40 MG tablet Take 40 mg by mouth in the morning. calcium carbonate 1500 (600 Ca) MG TABS tablet Take 1,200 mg by mouth daily      vitamin D (CHOLECALCIFEROL) 25 MCG (1000 UT) TABS tablet Take 1,000 Units by mouth daily      denosumab (PROLIA) 60 MG/ML SOSY SC injection Inject 60 mg into the skin every 6 months      loratadine (CLARITIN) 10 MG tablet Take 10 mg by mouth daily      potassium chloride (KLOR-CON M) 20 MEQ extended release tablet Take 20 mEq by mouth daily       No current facility-administered medications for this visit. Orders Placed This Encounter   Procedures    Basic Metabolic Panel     Standing Status:   Future     Standing Expiration Date:   7/29/2023    Hemoglobin A1C     Standing Status:   Future     Standing Expiration Date:   7/29/2023    Lipid Panel     Standing Status:   Future     Standing Expiration Date:   7/29/2023    ALT     Standing Status:   Future     Standing Expiration Date:   1/29/2023       There are no discontinued medications. 1. Type 2 diabetes mellitus with other specified complication, without long-term current use of insulin ([facility] [address]  Hopefully well controlled again  - Basic Metabolic Panel; Future  - Hemoglobin A1C; Future  - Lipid Panel; Future  - ALT; Future    2. Skin lesions   Lower back, if worsen will refer    3. Hypertension  Controlled    4. Osteoporosis  With a history of thoracic spine fractures. She is on Prolia and continues to have pain with certain movements    No follow-up provider specified.         Philip Garcia MD    Medicare Annual Wellness Visit    Amelia Mcbride is here for Medicare AWV (subsequent), 6 Month Follow-Up (Pt presents to the office today for a 6 month follow-up/), Diabetes, Skin Problem (Have some spots on her back she would like the doctor to check), and Back Pain (When bending over she starts having pain in the mid section of her back; only hurts when bending over to do things like get dishes out of )    Assessment & Plan   Type 2 diabetes mellitus with other specified complication, without long-term current use of insulin (HCC)  -     Basic Metabolic Panel; Future  -     Hemoglobin A1C; Future  -     Lipid Panel; Future  -     ALT; Future  Medicare annual wellness visit, subsequent    Recommendations for Preventive Services Due: see orders and patient instructions/AVS.  Recommended screening schedule for the next 5-10 years is provided to the patient in written form: see Patient Instructions/AVS.     Return in 1 year (on 7/29/2023) for Medicare Annual Wellness Visit in 1 year. Subjective   The following acute and/or chronic problems were also addressed today:   Diagnosis Orders   1. Type 2 diabetes mellitus with other specified complication, without long-term current use of insulin (HCC)  Basic Metabolic Panel    Hemoglobin A1C    Lipid Panel    ALT    ALT    Lipid Panel    Hemoglobin M1S    Basic Metabolic Panel      2. Medicare annual wellness visit, subsequent        3. Essential hypertension, benign        4. Recurrent depression ([facility] [address]        5. Thoracic back pain, unspecified back pain laterality, unspecified chronicity        6. Hyperglycemia        7. Mixed hyperlipidemia        8. Fatigue, unspecified type              Patient's complete Health Risk Assessment and screening values have been reviewed and are found in Flowsheets. The following problems were reviewed today and where indicated follow up appointments were made and/or referrals ordered.     Positive Risk Factor Screenings with Interventions:             General Health and ACP:  General  In general, how would you say your health is?: Good  In the past 7 days, have you experienced any of the following: New or Increased Pain, New or Increased Fatigue, Loneliness, Social Isolation, Stress or Anger?: No  Do you get the social and emotional support that you need?: Yes  Do you have a Living Will?: Yes    Advance Directives       Power of [address] Will ACP-Advance Directive ACP-Power of     Not on File Not on File Not on File Not on File          General Health Risk Interventions:  stable    Health Habits/Nutrition:  Physical Activity: Inactive    Days of Exercise per Week: 0 days    Minutes of Exercise per Session: 0 min     Have you lost any weight without trying in the past 3 months?: No  Body mass index: (!) 30.12  Have you seen the dentist within the past year?: (!) No  Health Habits/Nutrition Interventions: Active for her age             Objective   Vitals:    07/29/22 1401   BP: (!) 145/61   Site: Left Upper Arm   Position: Sitting   Cuff Size: Large Adult   Pulse: 61   Resp: 16   Temp: 97.2 °F (36.2 °C)   TempSrc: Temporal   Weight: 150 lb 6.4 oz (68.2 kg)   Height: 4' 11.25\" (1.505 m)      Body mass index is 30.12 kg/m². See note       Allergies   Allergen Reactions    Alendronate Itching    Aspirin Other (See Comments)     Pt reports she can not take due to internal bleeding     Naproxen Sodium Nausea Only     Prior to Visit Medications    Medication Sig Taking? Authorizing Provider   JANUVIA 100 MG tablet TAKE 1 TABLET EVERY DAY Yes Azael Tolbert MD   PARoxetine (PAXIL) 20 MG tablet TAKE 1 TABLET EVERY DAY Yes Azael Tolbert MD   hydroCHLOROthiazide (HYDRODIURIL) 25 MG tablet TAKE 1 TABLET EVERY DAY Yes Azael Tolbert MD   amLODIPine (NORVASC) 5 MG tablet TAKE 1 TABLET EVERY DAY Yes Ar Automatic Reconciliation   atorvastatin (LIPITOR) 40 MG tablet Take 40 mg by mouth in the morning.  Yes Ar Automatic Reconciliation   calcium carbonate 1500 (600 Ca) MG TABS tablet Take 1,200 mg by mouth daily Yes Ar Automatic Reconciliation   vitamin D (CHOLECALCIFEROL) 25 MCG (1000 UT) TABS tablet Take 1,000 Units by mouth daily Yes Ar Automatic Reconciliation   denosumab (PROLIA) 60 MG/ML SOSY SC injection Inject 60 mg into the skin every 6 months Yes Ar Automatic Reconciliation   loratadine (CLARITIN) 10 MG tablet Take 10 mg by mouth daily Yes Ar Automatic Reconciliation   potassium chloride (KLOR-CON M) 20 MEQ extended release tablet Take 20 mEq by mouth daily Yes Ar Automatic Reconciliation       CareTeam (Including outside providers/suppliers regularly involved in providing care):   Patient Care Team:  Darlene Medrano MD as PCP - Boby Luaghlin MD as PCP - [facility][1] Empaneled Provider     Reviewed and updated this visit:  Tobacco  Allergies  Meds  Med Hx  Surg Hx  Soc Hx  Fam Hx

## 2022-07-29 NOTE — PATIENT INSTRUCTIONS
Personalized Preventive Plan for Adilson Paul - 7/29/2022  Medicare offers a range of preventive health benefits. Some of the tests and screenings are paid in full while other may be subject to a deductible, co-insurance, and/or copay. Some of these benefits include a comprehensive review of your medical history including lifestyle, illnesses that may run in your family, and various assessments and screenings as appropriate. After reviewing your medical record and screening and assessments performed today your provider may have ordered immunizations, labs, imaging, and/or referrals for you. A list of these orders (if applicable) as well as your Preventive Care list are included within your After Visit Summary for your review. Other Preventive Recommendations:    A preventive eye exam performed by an eye specialist is recommended every 1-2 years to screen for glaucoma; cataracts, macular degeneration, and other eye disorders. A preventive dental visit is recommended every 6 months. Try to get at least 150 minutes of exercise per week or 10,000 steps per day on a pedometer . Order or download the FREE \"Exercise & Physical Activity: Your Everyday Guide\" from [employer]. Call 7-401.130.5191 or search [employer] online. You need 3936-1857 mg of calcium and 2551-4631 IU of vitamin D per day. It is possible to meet your calcium requirement with diet alone, but a vitamin D supplement is usually necessary to meet this goal.  When exposed to the sun, use a sunscreen that protects against both UVA and UVB radiation with an SPF of 30 or greater. Reapply every 2 to 3 hours or after sweating, drying off with a towel, or swimming. Always wear a seat belt when traveling in a car. Always wear a helmet when riding a bicycle or motorcycle.

## 2022-07-30 LAB
EST. AVERAGE GLUCOSE BLD GHB EST-MCNC: 131 MG/DL
HBA1C MFR BLD: 6.2 % (ref 4.8–5.6)

## 2022-10-19 ENCOUNTER — TELEPHONE (OUTPATIENT)
Dept: INTERNAL MEDICINE CLINIC | Facility: CLINIC | Age: 87
End: 2022-10-19

## 2022-10-19 NOTE — TELEPHONE ENCOUNTER
Questions:    1  patient states she has had 4 Moderna vaccines she would like to know if there is another one she needs? 2  How long does she need to wait after the Booster can she get the flu vaccine? 3  The medication she was prescribed for Diabetes Januvia is $400 every 3 months is there something less expense she use to control her diabetes?

## 2022-10-20 DIAGNOSIS — E11.9 TYPE 2 DIABETES MELLITUS WITHOUT COMPLICATION, WITHOUT LONG-TERM CURRENT USE OF INSULIN (HCC): Primary | ICD-10-CM

## 2022-10-20 RX ORDER — BLOOD-GLUCOSE METER
EACH MISCELLANEOUS
Qty: 1 KIT | Refills: 11 | Status: SHIPPED | OUTPATIENT
Start: 2022-10-20

## 2022-10-20 RX ORDER — LANCETS 33 GAUGE
EACH MISCELLANEOUS
Qty: 100 EACH | Refills: 3 | Status: SHIPPED | OUTPATIENT
Start: 2022-10-20

## 2022-10-20 RX ORDER — BLOOD SUGAR DIAGNOSTIC
STRIP MISCELLANEOUS
Qty: 100 EACH | Refills: 3 | Status: SHIPPED | OUTPATIENT
Start: 2022-10-20

## 2022-10-20 NOTE — TELEPHONE ENCOUNTER
Okay metformin 500 mg twice daily #60 refill x3.   I thought we had tried this before and had side effects for her but if not then order for her cms

## 2022-10-20 NOTE — TELEPHONE ENCOUNTER
This has been fully explained to the patient, who indicates understanding. Per pt she don't remember being on the medication, would like a rx sent to [place] [id number] and she also would like a monitor to check blood sugars.       Orders pending below

## 2022-10-20 NOTE — TELEPHONE ENCOUNTER
Pt would like to see if CMS would consider switching her to metformin. Its a cheaper alternative per her insurance company.   She cannot afford the Januvia

## 2022-10-20 NOTE — TELEPHONE ENCOUNTER
Please call patient about a medication change    she would like to change from [place] [facility] [place]  to metformin

## 2022-10-25 DIAGNOSIS — E11.9 TYPE 2 DIABETES MELLITUS WITHOUT COMPLICATION, WITHOUT LONG-TERM CURRENT USE OF INSULIN (HCC): Primary | ICD-10-CM

## 2022-10-25 RX ORDER — BLOOD-GLUCOSE METER
EACH MISCELLANEOUS
Qty: 1 EACH | Refills: 11 | Status: SHIPPED | OUTPATIENT
Start: 2022-10-25

## 2022-10-25 RX ORDER — CALCIUM CITRATE/VITAMIN D3 200MG-6.25
TABLET ORAL
Qty: 100 EACH | Refills: 3 | Status: SHIPPED | OUTPATIENT
Start: 2022-10-25

## 2022-12-14 RX ORDER — ATORVASTATIN CALCIUM 40 MG/1
TABLET, FILM COATED ORAL
Qty: 90 TABLET | Refills: 3 | Status: SHIPPED | OUTPATIENT
Start: 2022-12-14

## 2022-12-29 ENCOUNTER — TELEPHONE (OUTPATIENT)
Dept: INTERNAL MEDICINE CLINIC | Facility: CLINIC | Age: 87
End: 2022-12-29

## 2023-01-17 ENCOUNTER — OFFICE VISIT (OUTPATIENT)
Dept: INTERNAL MEDICINE CLINIC | Facility: CLINIC | Age: 88
End: 2023-01-17

## 2023-01-17 VITALS
SYSTOLIC BLOOD PRESSURE: 136 MMHG | HEIGHT: 59 IN | HEART RATE: 66 BPM | BODY MASS INDEX: 30.28 KG/M2 | DIASTOLIC BLOOD PRESSURE: 60 MMHG | RESPIRATION RATE: 16 BRPM | TEMPERATURE: 97.2 F | OXYGEN SATURATION: 97 % | WEIGHT: 150.2 LBS

## 2023-01-17 DIAGNOSIS — F33.9 RECURRENT DEPRESSION (HCC): ICD-10-CM

## 2023-01-17 DIAGNOSIS — C50.812 MALIGNANT NEOPLASM OF OVERLAPPING SITES OF LEFT FEMALE BREAST, UNSPECIFIED ESTROGEN RECEPTOR STATUS (HCC): ICD-10-CM

## 2023-01-17 DIAGNOSIS — E11.69 TYPE 2 DIABETES MELLITUS WITH OTHER SPECIFIED COMPLICATION, WITHOUT LONG-TERM CURRENT USE OF INSULIN (HCC): Primary | ICD-10-CM

## 2023-01-17 DIAGNOSIS — I10 ESSENTIAL HYPERTENSION, BENIGN: ICD-10-CM

## 2023-01-17 DIAGNOSIS — M81.0 OSTEOPOROSIS, UNSPECIFIED OSTEOPOROSIS TYPE, UNSPECIFIED PATHOLOGICAL FRACTURE PRESENCE: ICD-10-CM

## 2023-01-17 NOTE — PROGRESS NOTES
1/17/2023 2:41 PM  Location:[facility] [address] [place]ICINE  [place]  Patient #:  [id number]  YOB: 1935          YOUR LAST HEMOGLOBIN A1CS:   No results found for: HBA1C, [id number]    YOUR LAST LIPID PROFILE:   Lab Results   Component Value Date/Time    CHOL 134 07/29/2022 02:31 PM    HDL 58 07/29/2022 02:31 PM    VLDL 29 05/05/2021 03:06 PM         Lab Results   Component Value Date/Time    GFRAA >60 07/29/2022 02:31 PM    BUN 12 07/29/2022 02:31 PM     07/29/2022 02:31 PM    K 4.4 07/29/2022 02:31 PM     07/29/2022 02:31 PM    CO2 28 07/29/2022 02:31 PM           History of Present Illness     Chief Complaint   Patient presents with    6 Month Follow-Up     Pt presents to the office today for a 6 month follow-up      Arthritis    Diabetes     Some pain in both legs , not severe  Having loose stools on metformin     This patient was doing well on Januvia for blood glucose control due to the cost was switched to metformin. Since that time several weeks ago she has developed loose stool. She is having some pain in her lower extremities no severe back pain is noted. She has a known history of LS spine compression fractures and is on Prolia.       Ms. Rashi Monte is a 80 y.o. female  who presents for office visit      Allergies   Allergen Reactions    Alendronate Itching    Aspirin Other (See Comments)     Pt reports she can not take due to internal bleeding     Naproxen Sodium Nausea Only     Past Medical History:   Diagnosis Date    Acute bronchitis     Anxiety state, unspecified     Arthritis     Cancer ([facility] [address] 12/13    left breast    Chest pain, unspecified     Chronic pain     right shoulder, back     Depression     Enthesopathy of unspecified site 5/13/2015    Essential hypertension, benign 5/13/2015    GERD (gastroesophageal reflux disease)     no longer has    GI bleed      - during hospital stay had 3 units blood     Hypercholesterolemia     Hypertension controlled with medication    Ill-defined condition     pt reports her \"knees buckle\" a lot     Impaired fasting glucose 5/13/2015    Incarcerated incisional hernia 12/25/2017 1/30/18 s/p open ventral hernia repair with small circular ventralex mesh; Dr Aleena Mcmullen    Loss of height     Malignant neoplasm of breast (female), unspecified site     Mixed incontinence urge and stress (male)(female)     Other and unspecified hyperlipidemia 5/13/2015    Other unspecified back disorder     Overweight(278.02)     Pulmonary nodules 11/8/2016    Stroke ([facility] [address]     TIA - no effects 2016    Unspecified asthma(493.90)     Unspecified hemorrhoids without mention of complication 8/79/2504    Unspecified menopausal and postmenopausal disorder     Upper GI bleed 12/22/2017    Vertebral fracture, osteoporotic ([facility] [address] 10/9/2020     Social History     Socioeconomic History    Marital status:       Spouse name: None    Number of children: None    Years of education: None    Highest education level: None   Tobacco Use    Smoking status: Never    Smokeless tobacco: Never   Substance and Sexual Activity    Alcohol use: No    Drug use: No     Social Determinants of Health     Financial Resource Strain: Low Risk     Difficulty of Paying Living Expenses: Not hard at all   Food Insecurity: No Food Insecurity    Worried About Running Out of Food in the Last Year: Never true    Ran Out of Food in the Last Year: Never true   Physical Activity: Inactive    Days of Exercise per Week: 0 days    Minutes of Exercise per Session: 0 min     Past Surgical History:   Procedure Laterality Date    BREAST LUMPECTOMY  1/24/2014    LEFT BREAST NEEDLE LOCALIZED MASS EXCISION     performed by Douglas Santoro MD at [address] [place] [address] (CERVIX STATUS UNKNOWN)  1970s    [place] UNLISTED PROCEDURE ABDOMEN PERITONEUM & OMENTUM      esophagus to correct GERD unsure of procedure name     Current Outpatient Medications   Medication Sig Dispense Refill    Multiple Vitamins-Minerals (PRESERVISION AREDS PO) Take by mouth in the morning and at bedtime      SITagliptin (JANUVIA) 100 MG tablet Take 1 tablet by mouth daily 90 tablet 3    atorvastatin (LIPITOR) 40 MG tablet TAKE 1 TABLET EVERY NIGHT 90 tablet 3    blood glucose test strips (TRUE METRIX BLOOD GLUCOSE TEST) strip Use to check blood sugars once daily 100 each 3    Blood Glucose Monitoring Suppl (TRUE METRIX METER) MAURICIO Use to check blood sugars once daily 1 each 11    PARoxetine (PAXIL) 20 MG tablet TAKE 1 TABLET EVERY DAY 90 tablet 3    hydroCHLOROthiazide (HYDRODIURIL) 25 MG tablet TAKE 1 TABLET EVERY DAY 90 tablet 3    amLODIPine (NORVASC) 5 MG tablet TAKE 1 TABLET EVERY DAY      calcium carbonate 1500 (600 Ca) MG TABS tablet Take 1,200 mg by mouth daily      vitamin D (CHOLECALCIFEROL) 25 MCG (1000 UT) TABS tablet Take 1,000 Units by mouth daily      denosumab (PROLIA) 60 MG/ML SOSY SC injection Inject 60 mg into the skin every 6 months      loratadine (CLARITIN) 10 MG tablet Take 10 mg by mouth daily      potassium chloride (KLOR-CON M) 20 MEQ extended release tablet Take 20 mEq by mouth daily       No current facility-administered medications for this visit.      Health Maintenance   Topic Date Due    DTaP/Tdap/Td vaccine (1 - Tdap) Never done    Depression Monitoring  05/03/2023    Lipids  07/29/2023    Annual Wellness Visit (AWV)  07/30/2023    Flu vaccine  Completed    Shingles vaccine  Completed    Pneumococcal 65+ years Vaccine  Completed    COVID-19 Vaccine  Completed    Hepatitis A vaccine  Aged Out    Hib vaccine  Aged Out    Meningococcal (ACWY) vaccine  Aged Out     Family History   Problem Relation Age of Onset    Cancer Maternal Grandmother         breast at old age    [de-identified] Disease Mother     Alzheimer's Disease Mother     Cancer Father     Diabetes Brother     Lung Disease Brother     Alcohol Abuse Brother              Review of Systems  Review of Systems    /60 (Site: Left Upper Arm, Position: Standing)   Pulse 66   Temp 97.2 °F (36.2 °C) (Temporal)   Resp 16   Ht 4' 11.25\" (1.505 m)   Wt 150 lb 3.2 oz (68.1 kg)   SpO2 97% Comment: RA  BMI 30.08 kg/m²       Physical Exam    Physical Exam  Constitutional:       Appearance: Normal appearance. HENT:      Head: Normocephalic and atraumatic. Eyes:      Extraocular Movements: Extraocular movements intact. Pupils: Pupils are equal, round, and reactive to light. Cardiovascular:      Rate and Rhythm: Normal rate and regular rhythm. Heart sounds: Normal heart sounds. No murmur heard. Pulmonary:      Effort: Pulmonary effort is normal.      Breath sounds: Normal breath sounds. Skin:     General: Skin is warm and dry. Neurological:      General: No focal deficit present. Mental Status: She is alert and oriented to person, place, and time. Psychiatric:         Mood and Affect: Mood normal.         Behavior: Behavior normal.         Thought Content: Thought content normal.         Judgment: Judgment normal.       Assessment & Plan    Encounter Diagnoses   Name Primary?     Type 2 diabetes mellitus with other specified complication, without long-term current use of insulin (HCC) Yes    Osteoporosis, unspecified osteoporosis type, unspecified pathological fracture presence     Recurrent depression ([address]     Malignant neoplasm of overlapping sites of left female breast, unspecified estrogen receptor status ([address]        Current Outpatient Medications   Medication Sig Dispense Refill    Multiple Vitamins-Minerals (PRESERVISION AREDS PO) Take by mouth in the morning and at bedtime      SITagliptin (JANUVIA) 100 MG tablet Take 1 tablet by mouth daily 90 tablet 3    atorvastatin (LIPITOR) 40 MG tablet TAKE 1 TABLET EVERY NIGHT 90 tablet 3    blood glucose test strips (TRUE METRIX BLOOD GLUCOSE TEST) strip Use to check blood sugars once daily 100 each 3    Blood Glucose Monitoring Suppl (TRUE METRIX METER) MAURICIO Use to check blood sugars once daily 1 each 11    PARoxetine (PAXIL) 20 MG tablet TAKE 1 TABLET EVERY DAY 90 tablet 3    hydroCHLOROthiazide (HYDRODIURIL) 25 MG tablet TAKE 1 TABLET EVERY DAY 90 tablet 3    amLODIPine (NORVASC) 5 MG tablet TAKE 1 TABLET EVERY DAY      calcium carbonate 1500 (600 Ca) MG TABS tablet Take 1,200 mg by mouth daily      vitamin D (CHOLECALCIFEROL) 25 MCG (1000 UT) TABS tablet Take 1,000 Units by mouth daily      denosumab (PROLIA) 60 MG/ML SOSY SC injection Inject 60 mg into the skin every 6 months      loratadine (CLARITIN) 10 MG tablet Take 10 mg by mouth daily      potassium chloride (KLOR-CON M) 20 MEQ extended release tablet Take 20 mEq by mouth daily       No current facility-administered medications for this visit. Orders Placed This Encounter   Procedures    DEXA BONE DENSITY AXIAL SKELETON     dexter     Standing Status:   Future     Standing Expiration Date:   1/17/2024    Hemoglobin A1C     Standing Status:   Future     Standing Expiration Date:   0/68/9504    Basic Metabolic Panel     Standing Status:   Future     Standing Expiration Date:   1/17/2024       Medications Discontinued During This Encounter   Medication Reason    Blood Glucose Monitoring Suppl (ONE TOUCH ULTRA 2) w/Device KIT Formulary change    blood glucose test strips (ONETOUCH ULTRA) strip Formulary change    OneTouch Delica Lancets 71U MISC Formulary change    metFORMIN (GLUCOPHAGE) 500 MG tablet Ineffective       1. Type 2 diabetes mellitus with other specified complication, without long-term current use of insulin (HCC)  Due to GI intolerance we will switch back to Januvia  - Hemoglobin A1C; Future  - Basic Metabolic Panel; Future    2.  Osteoporosis, unspecified osteoporosis type, unspecified pathological fracture presence  Status post compression fracture on Prolia seems to be doing well  - denosumab (PROLIA) SC injection 60 mg  - DEXA BONE DENSITY AXIAL SKELETON; Future    3. Recurrent depression ([facility] [address]  Controlled    4. Malignant neoplasm of overlapping sites of left female breast, unspecified estrogen receptor status ([facility] [address]  Was followed by [facility] oncology but was told that she needed no further follow-up as she was disease-free over 5 years    5. Hypertension  Controlled on present meds  No follow-up provider specified.         Nicolas Alexis MD

## 2023-01-18 LAB
ANION GAP SERPL CALC-SCNC: 10 MMOL/L (ref 2–11)
BUN SERPL-MCNC: 13 MG/DL (ref 8–23)
CALCIUM SERPL-MCNC: 10.2 MG/DL (ref 8.3–10.4)
CHLORIDE SERPL-SCNC: 104 MMOL/L (ref 101–110)
CO2 SERPL-SCNC: 26 MMOL/L (ref 21–32)
CREAT SERPL-MCNC: 0.6 MG/DL (ref 0.6–1)
EST. AVERAGE GLUCOSE BLD GHB EST-MCNC: 128 MG/DL
GLUCOSE SERPL-MCNC: 117 MG/DL (ref 65–100)
HBA1C MFR BLD: 6.1 % (ref 4.8–5.6)
POTASSIUM SERPL-SCNC: 4 MMOL/L (ref 3.5–5.1)
SODIUM SERPL-SCNC: 140 MMOL/L (ref 133–143)

## 2023-02-27 RX ORDER — POTASSIUM CHLORIDE 20 MEQ/1
TABLET, EXTENDED RELEASE ORAL
Qty: 90 TABLET | Refills: 3 | Status: SHIPPED | OUTPATIENT
Start: 2023-02-27

## 2023-05-11 RX ORDER — HYDROCHLOROTHIAZIDE 25 MG/1
TABLET ORAL
Qty: 90 TABLET | Refills: 3 | Status: SHIPPED | OUTPATIENT
Start: 2023-05-11

## 2023-05-11 RX ORDER — AMLODIPINE BESYLATE 5 MG/1
TABLET ORAL
Qty: 90 TABLET | Refills: 3 | Status: SHIPPED | OUTPATIENT
Start: 2023-05-11

## 2023-05-11 RX ORDER — PAROXETINE HYDROCHLORIDE 20 MG/1
TABLET, FILM COATED ORAL
Qty: 90 TABLET | Refills: 3 | Status: SHIPPED | OUTPATIENT
Start: 2023-05-11

## 2023-07-06 ENCOUNTER — TELEPHONE (OUTPATIENT)
Dept: INTERNAL MEDICINE CLINIC | Facility: CLINIC | Age: 88
End: 2023-07-06

## 2023-07-06 RX ORDER — DENOSUMAB 60 MG/ML
60 INJECTION SUBCUTANEOUS
Qty: 1 EACH | Refills: 1 | Status: SHIPPED | OUTPATIENT
Start: 2023-07-06

## 2023-07-06 NOTE — TELEPHONE ENCOUNTER
Pt will need to contact [address] [place] [address] to check on shipment. They wont send the Prolia until the pt gives consent to ship.

## 2023-07-06 NOTE — TELEPHONE ENCOUNTER
Patient called wants to make sure her prolia is here, I checked it has not came and she has an appointment coming up on 7/20    Does she need to call or do we need to reach out?

## 2023-07-06 NOTE — TELEPHONE ENCOUNTER
Called patient to inform she needs to call [facility] for prolia , left message to return call

## 2023-07-08 DIAGNOSIS — M80.00XA AGE-RELATED OSTEOPOROSIS WITH CURRENT PATHOLOGICAL FRACTURE, UNSPECIFIED SITE, INITIAL ENCOUNTER FOR FRACTURE: ICD-10-CM

## 2023-07-08 DIAGNOSIS — Z78.0 ASYMPTOMATIC MENOPAUSAL STATE: ICD-10-CM

## 2023-07-10 RX ORDER — DENOSUMAB 60 MG/ML
INJECTION SUBCUTANEOUS
Qty: 1 ML | OUTPATIENT
Start: 2023-07-10

## 2023-07-17 RX ORDER — DENOSUMAB 60 MG/ML
60 INJECTION SUBCUTANEOUS
Qty: 1 EACH | Refills: 1 | Status: SHIPPED | OUTPATIENT
Start: 2023-07-17

## 2023-07-17 NOTE — TELEPHONE ENCOUNTER
Detail Level: Detailed The rx for Prolia was sent to local pharmacy and needs to go to the [address] [place] [address]. Please correct this. Thank you. Add [id number] Cpt? (Important Note: In 2017 The Use Of 21844 Is Being Tracked By Cms To Determine Future Global Period Reimbursement For Global Periods): no

## 2023-07-20 ENCOUNTER — OFFICE VISIT (OUTPATIENT)
Dept: INTERNAL MEDICINE CLINIC | Facility: CLINIC | Age: 88
End: 2023-07-20
Payer: MEDICARE

## 2023-07-20 VITALS
HEART RATE: 69 BPM | BODY MASS INDEX: 30.44 KG/M2 | RESPIRATION RATE: 16 BRPM | HEIGHT: 59 IN | TEMPERATURE: 97 F | SYSTOLIC BLOOD PRESSURE: 172 MMHG | DIASTOLIC BLOOD PRESSURE: 80 MMHG | WEIGHT: 151 LBS

## 2023-07-20 DIAGNOSIS — R53.83 FATIGUE, UNSPECIFIED TYPE: ICD-10-CM

## 2023-07-20 DIAGNOSIS — E11.69 TYPE 2 DIABETES MELLITUS WITH OTHER SPECIFIED COMPLICATION, WITHOUT LONG-TERM CURRENT USE OF INSULIN (HCC): Primary | ICD-10-CM

## 2023-07-20 DIAGNOSIS — M81.0 OSTEOPOROSIS, UNSPECIFIED OSTEOPOROSIS TYPE, UNSPECIFIED PATHOLOGICAL FRACTURE PRESENCE: ICD-10-CM

## 2023-07-20 DIAGNOSIS — Z17.1 MALIGNANT NEOPLASM OF OVERLAPPING SITES OF LEFT BREAST IN FEMALE, ESTROGEN RECEPTOR NEGATIVE (HCC): ICD-10-CM

## 2023-07-20 DIAGNOSIS — E55.9 VITAMIN D DEFICIENCY, UNSPECIFIED: ICD-10-CM

## 2023-07-20 DIAGNOSIS — M80.00XA AGE-RELATED OSTEOPOROSIS WITH CURRENT PATHOLOGICAL FRACTURE, UNSPECIFIED SITE, INITIAL ENCOUNTER FOR FRACTURE: ICD-10-CM

## 2023-07-20 DIAGNOSIS — C50.812 MALIGNANT NEOPLASM OF OVERLAPPING SITES OF LEFT BREAST IN FEMALE, ESTROGEN RECEPTOR NEGATIVE (HCC): ICD-10-CM

## 2023-07-20 DIAGNOSIS — I10 ESSENTIAL HYPERTENSION, BENIGN: ICD-10-CM

## 2023-07-20 DIAGNOSIS — R53.1 WEAKNESS: ICD-10-CM

## 2023-07-20 LAB
25(OH)D3 SERPL-MCNC: 44.4 NG/ML (ref 30–100)
ALBUMIN SERPL-MCNC: 3.6 G/DL (ref 3.2–4.6)
ALBUMIN/GLOB SERPL: 1.2 (ref 0.4–1.6)
ALP SERPL-CCNC: 94 U/L (ref 50–136)
ALT SERPL-CCNC: 26 U/L (ref 12–65)
ANION GAP SERPL CALC-SCNC: 7 MMOL/L (ref 2–11)
AST SERPL-CCNC: 28 U/L (ref 15–37)
BASOPHILS # BLD: 0 K/UL (ref 0–0.2)
BASOPHILS NFR BLD: 1 % (ref 0–2)
BILIRUB SERPL-MCNC: 0.4 MG/DL (ref 0.2–1.1)
BUN SERPL-MCNC: 14 MG/DL (ref 8–23)
CALCIUM SERPL-MCNC: 9.7 MG/DL (ref 8.3–10.4)
CHLORIDE SERPL-SCNC: 108 MMOL/L (ref 101–110)
CHOLEST SERPL-MCNC: 137 MG/DL
CO2 SERPL-SCNC: 27 MMOL/L (ref 21–32)
CREAT SERPL-MCNC: 0.6 MG/DL (ref 0.6–1)
DIFFERENTIAL METHOD BLD: NORMAL
EOSINOPHIL # BLD: 0.3 K/UL (ref 0–0.8)
EOSINOPHIL NFR BLD: 4 % (ref 0.5–7.8)
ERYTHROCYTE [DISTWIDTH] IN BLOOD BY AUTOMATED COUNT: 13.2 % (ref 11.9–14.6)
GLOBULIN SER CALC-MCNC: 3 G/DL (ref 2.8–4.5)
GLUCOSE SERPL-MCNC: 108 MG/DL (ref 65–100)
HCT VFR BLD AUTO: 40.6 % (ref 35.8–46.3)
HDLC SERPL-MCNC: 64 MG/DL (ref 40–60)
HDLC SERPL: 2.1
HGB BLD-MCNC: 12.9 G/DL (ref 11.7–15.4)
IMM GRANULOCYTES # BLD AUTO: 0 K/UL (ref 0–0.5)
IMM GRANULOCYTES NFR BLD AUTO: 0 % (ref 0–5)
LDLC SERPL CALC-MCNC: 58.4 MG/DL
LYMPHOCYTES # BLD: 2.4 K/UL (ref 0.5–4.6)
LYMPHOCYTES NFR BLD: 30 % (ref 13–44)
MCH RBC QN AUTO: 31.3 PG (ref 26.1–32.9)
MCHC RBC AUTO-ENTMCNC: 31.8 G/DL (ref 31.4–35)
MCV RBC AUTO: 98.5 FL (ref 82–102)
MONOCYTES # BLD: 0.6 K/UL (ref 0.1–1.3)
MONOCYTES NFR BLD: 8 % (ref 4–12)
NEUTS SEG # BLD: 4.6 K/UL (ref 1.7–8.2)
NEUTS SEG NFR BLD: 57 % (ref 43–78)
NRBC # BLD: 0 K/UL (ref 0–0.2)
PLATELET # BLD AUTO: 234 K/UL (ref 150–450)
PMV BLD AUTO: 11.5 FL (ref 9.4–12.3)
POTASSIUM SERPL-SCNC: 3.8 MMOL/L (ref 3.5–5.1)
PROT SERPL-MCNC: 6.6 G/DL (ref 6.3–8.2)
RBC # BLD AUTO: 4.12 M/UL (ref 4.05–5.2)
SODIUM SERPL-SCNC: 142 MMOL/L (ref 133–143)
TRIGL SERPL-MCNC: 73 MG/DL (ref 35–150)
TSH, 3RD GENERATION: 1.98 UIU/ML (ref 0.36–3.74)
VLDLC SERPL CALC-MCNC: 14.6 MG/DL (ref 6–23)
WBC # BLD AUTO: 8 K/UL (ref 4.3–11.1)

## 2023-07-20 PROCEDURE — 1090F PRES/ABSN URINE INCON ASSESS: CPT | Performed by: INTERNAL MEDICINE

## 2023-07-20 PROCEDURE — G8417 CALC BMI ABV UP PARAM F/U: HCPCS | Performed by: INTERNAL MEDICINE

## 2023-07-20 PROCEDURE — 1123F ACP DISCUSS/DSCN MKR DOCD: CPT | Performed by: INTERNAL MEDICINE

## 2023-07-20 PROCEDURE — 3044F HG A1C LEVEL LT 7.0%: CPT | Performed by: INTERNAL MEDICINE

## 2023-07-20 PROCEDURE — G8427 DOCREV CUR MEDS BY ELIG CLIN: HCPCS | Performed by: INTERNAL MEDICINE

## 2023-07-20 PROCEDURE — 99214 OFFICE O/P EST MOD 30 MIN: CPT | Performed by: INTERNAL MEDICINE

## 2023-07-20 PROCEDURE — 1036F TOBACCO NON-USER: CPT | Performed by: INTERNAL MEDICINE

## 2023-07-20 SDOH — ECONOMIC STABILITY: FOOD INSECURITY: WITHIN THE PAST 12 MONTHS, YOU WORRIED THAT YOUR FOOD WOULD RUN OUT BEFORE YOU GOT MONEY TO BUY MORE.: NEVER TRUE

## 2023-07-20 SDOH — ECONOMIC STABILITY: HOUSING INSECURITY
IN THE LAST 12 MONTHS, WAS THERE A TIME WHEN YOU DID NOT HAVE A STEADY PLACE TO SLEEP OR SLEPT IN A SHELTER (INCLUDING NOW)?: NO

## 2023-07-20 SDOH — ECONOMIC STABILITY: INCOME INSECURITY: HOW HARD IS IT FOR YOU TO PAY FOR THE VERY BASICS LIKE FOOD, HOUSING, MEDICAL CARE, AND HEATING?: PATIENT DECLINED

## 2023-07-20 SDOH — ECONOMIC STABILITY: FOOD INSECURITY: WITHIN THE PAST 12 MONTHS, THE FOOD YOU BOUGHT JUST DIDN'T LAST AND YOU DIDN'T HAVE MONEY TO GET MORE.: NEVER TRUE

## 2023-07-21 LAB
EST. AVERAGE GLUCOSE BLD GHB EST-MCNC: 143 MG/DL
HBA1C MFR BLD: 6.6 % (ref 4.8–5.6)

## 2023-07-27 ENCOUNTER — TELEPHONE (OUTPATIENT)
Dept: INTERNAL MEDICINE CLINIC | Facility: CLINIC | Age: 88
End: 2023-07-27

## 2023-07-27 NOTE — TELEPHONE ENCOUNTER
----- Message from Lacey Malcolm sent at 7/26/2023  4:01 PM EDT -----  Subject: Results Request    QUESTIONS  Results: labs;  Ordered by:   Date Performed: 2023-07-20  ---------------------------------------------------------------------------  --------------  Tonny GALAN    [phone]; OK to leave message on voicemail  ---------------------------------------------------------------------------  --------------

## 2023-08-15 DIAGNOSIS — M81.0 OSTEOPOROSIS, UNSPECIFIED OSTEOPOROSIS TYPE, UNSPECIFIED PATHOLOGICAL FRACTURE PRESENCE: ICD-10-CM

## 2023-08-16 ENCOUNTER — TELEPHONE (OUTPATIENT)
Dept: INTERNAL MEDICINE CLINIC | Facility: CLINIC | Age: 88
End: 2023-08-16

## 2023-08-16 NOTE — TELEPHONE ENCOUNTER
----- Message from Emani Castillo MD sent at 8/16/2023  8:19 AM EDT -----  Please notify patient that their bone density still shows some bone loss or osteopenia.   She needs to continue Prolia injections and adequate calcium and vitamin D in her diet cms

## 2023-12-05 ENCOUNTER — TELEPHONE (OUTPATIENT)
Dept: INTERNAL MEDICINE CLINIC | Facility: CLINIC | Age: 88
End: 2023-12-05

## 2023-12-05 NOTE — TELEPHONE ENCOUNTER
----- Message from Casper Whalen sent at 12/5/2023 11:40 AM EST -----  Subject: Message to Provider    QUESTIONS  Information for Provider? PT HAS A QUESTION ABOUT AN UPCOMING INJECTION;   PT IS REQUESTING A CALL BACK FROM A NURSE; PLEASE ADVISE  ---------------------------------------------------------------------------  --------------  Angie FRANCO  [phone]; OK to leave message on voicemail  ---------------------------------------------------------------------------  --------------  SCRIPT ANSWERS  Relationship to Patient?  Self

## 2023-12-05 NOTE — TELEPHONE ENCOUNTER
Called and spoke with pt, she had received a letter from [employer] about her insurance not covering the Prolia anymore. Informed pt that she gets the Prolia from [facility] and its filed under her medical plan instead of the prescription plan so she shouldn't have to worry about coverage. [facility] will verify benefits and if its any problems they will contact the pt.

## 2024-01-08 RX ORDER — SITAGLIPTIN 100 MG/1
100 TABLET, FILM COATED ORAL DAILY
Qty: 90 TABLET | Refills: 3 | Status: SHIPPED | OUTPATIENT
Start: 2024-01-08

## 2024-01-21 ASSESSMENT — PATIENT HEALTH QUESTIONNAIRE - PHQ9
SUM OF ALL RESPONSES TO PHQ QUESTIONS 1-9: 2
4. FEELING TIRED OR HAVING LITTLE ENERGY: MORE THAN HALF THE DAYS
3. TROUBLE FALLING OR STAYING ASLEEP: NOT AT ALL
9. THOUGHTS THAT YOU WOULD BE BETTER OFF DEAD, OR OF HURTING YOURSELF: NOT AT ALL
8. MOVING OR SPEAKING SO SLOWLY THAT OTHER PEOPLE COULD HAVE NOTICED. OR THE OPPOSITE - BEING SO FIDGETY OR RESTLESS THAT YOU HAVE BEEN MOVING AROUND A LOT MORE THAN USUAL: NOT AT ALL
1. LITTLE INTEREST OR PLEASURE IN DOING THINGS: NOT AT ALL
3. TROUBLE FALLING OR STAYING ASLEEP: 0
9. THOUGHTS THAT YOU WOULD BE BETTER OFF DEAD, OR OF HURTING YOURSELF: 0
2. FEELING DOWN, DEPRESSED OR HOPELESS: NOT AT ALL
7. TROUBLE CONCENTRATING ON THINGS, SUCH AS READING THE NEWSPAPER OR WATCHING TELEVISION: 0
6. FEELING BAD ABOUT YOURSELF - OR THAT YOU ARE A FAILURE OR HAVE LET YOURSELF OR YOUR FAMILY DOWN: NOT AT ALL
SUM OF ALL RESPONSES TO PHQ9 QUESTIONS 1 & 2: 0
7. TROUBLE CONCENTRATING ON THINGS, SUCH AS READING THE NEWSPAPER OR WATCHING TELEVISION: NOT AT ALL
2. FEELING DOWN, DEPRESSED OR HOPELESS: 0
SUM OF ALL RESPONSES TO PHQ QUESTIONS 1-9: 2
1. LITTLE INTEREST OR PLEASURE IN DOING THINGS: 0
SUM OF ALL RESPONSES TO PHQ QUESTIONS 1-9: 2
4. FEELING TIRED OR HAVING LITTLE ENERGY: 2
8. MOVING OR SPEAKING SO SLOWLY THAT OTHER PEOPLE COULD HAVE NOTICED. OR THE OPPOSITE, BEING SO FIGETY OR RESTLESS THAT YOU HAVE BEEN MOVING AROUND A LOT MORE THAN USUAL: 0
5. POOR APPETITE OR OVEREATING: 0
10. IF YOU CHECKED OFF ANY PROBLEMS, HOW DIFFICULT HAVE THESE PROBLEMS MADE IT FOR YOU TO DO YOUR WORK, TAKE CARE OF THINGS AT HOME, OR GET ALONG WITH OTHER PEOPLE: NOT DIFFICULT AT ALL
6. FEELING BAD ABOUT YOURSELF - OR THAT YOU ARE A FAILURE OR HAVE LET YOURSELF OR YOUR FAMILY DOWN: 0
SUM OF ALL RESPONSES TO PHQ QUESTIONS 1-9: 2
5. POOR APPETITE OR OVEREATING: NOT AT ALL
10. IF YOU CHECKED OFF ANY PROBLEMS, HOW DIFFICULT HAVE THESE PROBLEMS MADE IT FOR YOU TO DO YOUR WORK, TAKE CARE OF THINGS AT HOME, OR GET ALONG WITH OTHER PEOPLE: 0
SUM OF ALL RESPONSES TO PHQ QUESTIONS 1-9: 2

## 2024-01-24 ENCOUNTER — OFFICE VISIT (OUTPATIENT)
Dept: INTERNAL MEDICINE CLINIC | Facility: CLINIC | Age: 89
End: 2024-01-24
Payer: MEDICARE

## 2024-01-24 VITALS
DIASTOLIC BLOOD PRESSURE: 95 MMHG | BODY MASS INDEX: 31 KG/M2 | SYSTOLIC BLOOD PRESSURE: 174 MMHG | HEIGHT: 59 IN | HEART RATE: 67 BPM | TEMPERATURE: 97.2 F | RESPIRATION RATE: 16 BRPM | WEIGHT: 153.8 LBS

## 2024-01-24 DIAGNOSIS — F33.1 MAJOR DEPRESSIVE DISORDER, RECURRENT, MODERATE (HCC): ICD-10-CM

## 2024-01-24 DIAGNOSIS — Z00.00 MEDICARE ANNUAL WELLNESS VISIT, SUBSEQUENT: ICD-10-CM

## 2024-01-24 DIAGNOSIS — E11.69 TYPE 2 DIABETES MELLITUS WITH OTHER SPECIFIED COMPLICATION, WITHOUT LONG-TERM CURRENT USE OF INSULIN (HCC): ICD-10-CM

## 2024-01-24 DIAGNOSIS — C50.812 MALIGNANT NEOPLASM OF OVERLAPPING SITES OF LEFT BREAST IN FEMALE, ESTROGEN RECEPTOR NEGATIVE (HCC): ICD-10-CM

## 2024-01-24 DIAGNOSIS — Z17.1 MALIGNANT NEOPLASM OF OVERLAPPING SITES OF LEFT BREAST IN FEMALE, ESTROGEN RECEPTOR NEGATIVE (HCC): ICD-10-CM

## 2024-01-24 DIAGNOSIS — Z23 NEED FOR PROPHYLACTIC VACCINATION AGAINST STREPTOCOCCUS PNEUMONIAE (PNEUMOCOCCUS): ICD-10-CM

## 2024-01-24 DIAGNOSIS — M81.6 LOCALIZED OSTEOPOROSIS, UNSPECIFIED PATHOLOGICAL FRACTURE PRESENCE: ICD-10-CM

## 2024-01-24 DIAGNOSIS — E11.9 TYPE 2 DIABETES MELLITUS WITHOUT COMPLICATION, WITHOUT LONG-TERM CURRENT USE OF INSULIN (HCC): Primary | ICD-10-CM

## 2024-01-24 DIAGNOSIS — K62.5 RECTAL BLEEDING: ICD-10-CM

## 2024-01-24 DIAGNOSIS — F33.9 RECURRENT DEPRESSION (HCC): ICD-10-CM

## 2024-01-24 LAB
ANION GAP SERPL CALC-SCNC: 4 MMOL/L (ref 2–11)
BASOPHILS # BLD: 0.1 K/UL (ref 0–0.2)
BASOPHILS NFR BLD: 1 % (ref 0–2)
BUN SERPL-MCNC: 11 MG/DL (ref 8–23)
CALCIUM SERPL-MCNC: 9.6 MG/DL (ref 8.3–10.4)
CHLORIDE SERPL-SCNC: 107 MMOL/L (ref 103–113)
CO2 SERPL-SCNC: 29 MMOL/L (ref 21–32)
CREAT SERPL-MCNC: 0.6 MG/DL (ref 0.6–1)
DIFFERENTIAL METHOD BLD: NORMAL
EOSINOPHIL # BLD: 0.2 K/UL (ref 0–0.8)
EOSINOPHIL NFR BLD: 3 % (ref 0.5–7.8)
ERYTHROCYTE [DISTWIDTH] IN BLOOD BY AUTOMATED COUNT: 12.9 % (ref 11.9–14.6)
EST. AVERAGE GLUCOSE BLD GHB EST-MCNC: 134 MG/DL
GLUCOSE SERPL-MCNC: 130 MG/DL (ref 65–100)
HBA1C MFR BLD: 6.3 % (ref 4.8–5.6)
HCT VFR BLD AUTO: 40.8 % (ref 35.8–46.3)
HGB BLD-MCNC: 13.1 G/DL (ref 11.7–15.4)
IMM GRANULOCYTES # BLD AUTO: 0 K/UL (ref 0–0.5)
IMM GRANULOCYTES NFR BLD AUTO: 0 % (ref 0–5)
LYMPHOCYTES # BLD: 2.1 K/UL (ref 0.5–4.6)
LYMPHOCYTES NFR BLD: 29 % (ref 13–44)
MCH RBC QN AUTO: 31.2 PG (ref 26.1–32.9)
MCHC RBC AUTO-ENTMCNC: 32.1 G/DL (ref 31.4–35)
MCV RBC AUTO: 97.1 FL (ref 82–102)
MONOCYTES # BLD: 0.6 K/UL (ref 0.1–1.3)
MONOCYTES NFR BLD: 9 % (ref 4–12)
NEUTS SEG # BLD: 4.2 K/UL (ref 1.7–8.2)
NEUTS SEG NFR BLD: 58 % (ref 43–78)
NRBC # BLD: 0 K/UL (ref 0–0.2)
PLATELET # BLD AUTO: 229 K/UL (ref 150–450)
PMV BLD AUTO: 11.3 FL (ref 9.4–12.3)
POTASSIUM SERPL-SCNC: 3.4 MMOL/L (ref 3.5–5.1)
RBC # BLD AUTO: 4.2 M/UL (ref 4.05–5.2)
SODIUM SERPL-SCNC: 140 MMOL/L (ref 136–146)
WBC # BLD AUTO: 7.1 K/UL (ref 4.3–11.1)

## 2024-01-24 PROCEDURE — G8427 DOCREV CUR MEDS BY ELIG CLIN: HCPCS | Performed by: INTERNAL MEDICINE

## 2024-01-24 PROCEDURE — G8417 CALC BMI ABV UP PARAM F/U: HCPCS | Performed by: INTERNAL MEDICINE

## 2024-01-24 PROCEDURE — G8484 FLU IMMUNIZE NO ADMIN: HCPCS | Performed by: INTERNAL MEDICINE

## 2024-01-24 PROCEDURE — 1123F ACP DISCUSS/DSCN MKR DOCD: CPT | Performed by: INTERNAL MEDICINE

## 2024-01-24 PROCEDURE — 1036F TOBACCO NON-USER: CPT | Performed by: INTERNAL MEDICINE

## 2024-01-24 PROCEDURE — 90677 PCV20 VACCINE IM: CPT | Performed by: INTERNAL MEDICINE

## 2024-01-24 PROCEDURE — G0009 ADMIN PNEUMOCOCCAL VACCINE: HCPCS | Performed by: INTERNAL MEDICINE

## 2024-01-24 PROCEDURE — 99213 OFFICE O/P EST LOW 20 MIN: CPT | Performed by: INTERNAL MEDICINE

## 2024-01-24 PROCEDURE — G0439 PPPS, SUBSEQ VISIT: HCPCS | Performed by: INTERNAL MEDICINE

## 2024-01-24 PROCEDURE — 1090F PRES/ABSN URINE INCON ASSESS: CPT | Performed by: INTERNAL MEDICINE

## 2024-01-24 RX ORDER — POTASSIUM CHLORIDE 20 MEQ/1
20 TABLET, EXTENDED RELEASE ORAL DAILY
Qty: 90 TABLET | Refills: 3 | Status: SHIPPED | OUTPATIENT
Start: 2024-01-24 | End: 2024-01-25 | Stop reason: SDUPTHER

## 2024-01-24 RX ORDER — ATORVASTATIN CALCIUM 40 MG/1
40 TABLET, FILM COATED ORAL NIGHTLY
Qty: 90 TABLET | Refills: 3 | Status: SHIPPED | OUTPATIENT
Start: 2024-01-24

## 2024-01-24 RX ORDER — AMLODIPINE BESYLATE 5 MG/1
5 TABLET ORAL DAILY
Qty: 90 TABLET | Refills: 3 | Status: SHIPPED | OUTPATIENT
Start: 2024-01-24

## 2024-01-24 RX ORDER — PAROXETINE HYDROCHLORIDE 20 MG/1
20 TABLET, FILM COATED ORAL DAILY
Qty: 90 TABLET | Refills: 3 | Status: SHIPPED | OUTPATIENT
Start: 2024-01-24

## 2024-01-24 RX ORDER — HYDROCHLOROTHIAZIDE 25 MG/1
25 TABLET ORAL DAILY
Qty: 90 TABLET | Refills: 3 | Status: SHIPPED | OUTPATIENT
Start: 2024-01-24

## 2024-01-24 ASSESSMENT — PATIENT HEALTH QUESTIONNAIRE - PHQ9
3. TROUBLE FALLING OR STAYING ASLEEP: 0
SUM OF ALL RESPONSES TO PHQ9 QUESTIONS 1 & 2: 0
2. FEELING DOWN, DEPRESSED OR HOPELESS: 0
8. MOVING OR SPEAKING SO SLOWLY THAT OTHER PEOPLE COULD HAVE NOTICED. OR THE OPPOSITE, BEING SO FIGETY OR RESTLESS THAT YOU HAVE BEEN MOVING AROUND A LOT MORE THAN USUAL: 0
7. TROUBLE CONCENTRATING ON THINGS, SUCH AS READING THE NEWSPAPER OR WATCHING TELEVISION: 0
4. FEELING TIRED OR HAVING LITTLE ENERGY: 0
SUM OF ALL RESPONSES TO PHQ QUESTIONS 1-9: 0
6. FEELING BAD ABOUT YOURSELF - OR THAT YOU ARE A FAILURE OR HAVE LET YOURSELF OR YOUR FAMILY DOWN: 0
9. THOUGHTS THAT YOU WOULD BE BETTER OFF DEAD, OR OF HURTING YOURSELF: 0
10. IF YOU CHECKED OFF ANY PROBLEMS, HOW DIFFICULT HAVE THESE PROBLEMS MADE IT FOR YOU TO DO YOUR WORK, TAKE CARE OF THINGS AT HOME, OR GET ALONG WITH OTHER PEOPLE: 0
1. LITTLE INTEREST OR PLEASURE IN DOING THINGS: 0
5. POOR APPETITE OR OVEREATING: 0
SUM OF ALL RESPONSES TO PHQ QUESTIONS 1-9: 0

## 2024-01-24 NOTE — PATIENT INSTRUCTIONS
change.  If you tend to feel dizzy after you take medicine, avoid activity at that time. Try being active before you take your medicine. This will reduce your risk of falls.  If you plan to be active at home, make sure to clear your space before you get started. Remove things like TV cords, coffee tables, and throw rugs. It's safest to have plenty of space to move freely.  The key to getting more active is to take it slow and steady. Try to improve only a little bit at a time. Pick just one area to improve on at first. And if an activity hurts, stop and talk to your doctor.  Where can you learn more?  Go to https://www.[employer].net/patientEd and enter P600 to learn more about \"Learning About Being Active as an Older Adult.\"  Current as of: June 6, 2023               Content Version: 13.9  © 1293-6072 [employer].   Care instructions adapted under license by [employer]. If you have questions about a medical condition or this instruction, always ask your healthcare professional. [employer] disclaims any warranty or liability for your use of this information.           Hearing Loss: Care Instructions  Overview     Hearing loss is a sudden or slow decrease in how well you hear. It can range from slight to profound. Permanent hearing loss can occur with aging. It also can happen when you are exposed long-term to loud noise. Examples include listening to loud music, riding motorcycles, or being around other loud machines.  Hearing loss can affect your work and home life. It can make you feel lonely or depressed. You may feel that you have lost your independence. But hearing aids and other devices can help you hear better and feel connected to others.  Follow-up care is a key part of your treatment and safety. Be sure to make and go to all appointments, and call your doctor if you are having problems. It's also a good idea to know your test results and keep a list of the medicines you

## 2024-01-24 NOTE — PROGRESS NOTES
1/24/2024 1:10 PM  Location:[facility] PHYSICIAN SERVICES  [facility] [place]  [place]  Patient #:  [id number]  YOB: 1935          YOUR LAST HEMOGLOBIN A1CS:   No results found for: \"HBA1C\", \"[id number]\"    YOUR LAST LIPID PROFILE:   Lab Results   Component Value Date/Time    CHOL 137 07/20/2023 02:45 PM    HDL 64 07/20/2023 02:45 PM    VLDL 29 05/05/2021 03:06 PM         Lab Results   Component Value Date/Time    GFRAA >60 07/29/2022 02:31 PM    BUN 14 07/20/2023 02:45 PM     07/20/2023 02:45 PM    K 3.8 07/20/2023 02:45 PM     07/20/2023 02:45 PM    CO2 27 07/20/2023 02:45 PM           History of Present Illness     Chief Complaint   Patient presents with    Medicare AWV     subsequent    6 Month Follow-Up     Pt presents to the office today for a 6 month follow-up     This patient is brought in by her family today.  She says she is having no new problems except for continued arthritic discomfort in multiple joints.  She continues to receive Prolia every 6 months.  Blood sugar control has been excellent.      Ms. Farr is a 88 y.o. female  who presents for office visit      Allergies   Allergen Reactions    Alendronate Itching    Aspirin Other (See Comments)     Pt reports she can not take due to internal bleeding     Naproxen Sodium Nausea Only     Past Medical History:   Diagnosis Date    Acute bronchitis     Anxiety state, unspecified     Arthritis     Cancer (HCC) 12/13    left breast    Chest pain, unspecified     Chronic pain     right shoulder, back     Depression     Enthesopathy of unspecified site 5/13/2015    Essential hypertension, benign 5/13/2015    GERD (gastroesophageal reflux disease)     no longer has    GI bleed      - during hospital stay had 3 units blood     Hypercholesterolemia     Hypertension     controlled with medication    Ill-defined condition     pt reports her \"knees buckle\" a lot     Impaired fasting glucose 5/13/2015    Incarcerated

## 2024-01-25 ENCOUNTER — TELEPHONE (OUTPATIENT)
Dept: INTERNAL MEDICINE CLINIC | Facility: CLINIC | Age: 89
End: 2024-01-25

## 2024-01-25 RX ORDER — POTASSIUM CHLORIDE 20 MEQ/1
20 TABLET, EXTENDED RELEASE ORAL 2 TIMES DAILY
Qty: 180 TABLET | Refills: 3 | Status: SHIPPED | OUTPATIENT
Start: 2024-01-25

## 2024-01-25 NOTE — TELEPHONE ENCOUNTER
----- Message from Ricky Jeffries MD sent at 1/25/2024  6:51 AM EST -----  Please notify patient that their lab results are ok except the potassium is low, increase KCL to 20 meq bid cms

## 2024-01-25 NOTE — TELEPHONE ENCOUNTER
This has been fully explained to the patient, who indicates understanding.      Rx pending below to be sent to pt's pharmacy.

## 2024-04-05 RX ORDER — POTASSIUM CHLORIDE 20 MEQ/1
TABLET, EXTENDED RELEASE ORAL
Refills: 0 | OUTPATIENT
Start: 2024-04-05

## 2024-07-31 ENCOUNTER — OFFICE VISIT (OUTPATIENT)
Dept: INTERNAL MEDICINE CLINIC | Facility: CLINIC | Age: 89
End: 2024-07-31
Payer: MEDICARE

## 2024-07-31 VITALS
HEART RATE: 78 BPM | HEIGHT: 59 IN | BODY MASS INDEX: 30.56 KG/M2 | TEMPERATURE: 97.2 F | RESPIRATION RATE: 16 BRPM | DIASTOLIC BLOOD PRESSURE: 83 MMHG | OXYGEN SATURATION: 9 % | SYSTOLIC BLOOD PRESSURE: 161 MMHG | WEIGHT: 151.6 LBS

## 2024-07-31 DIAGNOSIS — R73.9 HYPERGLYCEMIA: ICD-10-CM

## 2024-07-31 DIAGNOSIS — R53.83 FATIGUE, UNSPECIFIED TYPE: ICD-10-CM

## 2024-07-31 DIAGNOSIS — G89.29 CHRONIC RIGHT-SIDED LOW BACK PAIN WITHOUT SCIATICA: ICD-10-CM

## 2024-07-31 DIAGNOSIS — C50.812 MALIGNANT NEOPLASM OF OVERLAPPING SITES OF LEFT BREAST IN FEMALE, ESTROGEN RECEPTOR NEGATIVE (HCC): ICD-10-CM

## 2024-07-31 DIAGNOSIS — M81.6 LOCALIZED OSTEOPOROSIS, UNSPECIFIED PATHOLOGICAL FRACTURE PRESENCE: ICD-10-CM

## 2024-07-31 DIAGNOSIS — M54.50 CHRONIC RIGHT-SIDED LOW BACK PAIN WITHOUT SCIATICA: ICD-10-CM

## 2024-07-31 DIAGNOSIS — E83.42 HYPOMAGNESEMIA: ICD-10-CM

## 2024-07-31 DIAGNOSIS — E55.9 VITAMIN D DEFICIENCY: ICD-10-CM

## 2024-07-31 DIAGNOSIS — E11.69 TYPE 2 DIABETES MELLITUS WITH OTHER SPECIFIED COMPLICATION, WITHOUT LONG-TERM CURRENT USE OF INSULIN (HCC): ICD-10-CM

## 2024-07-31 DIAGNOSIS — Z17.1 MALIGNANT NEOPLASM OF OVERLAPPING SITES OF LEFT BREAST IN FEMALE, ESTROGEN RECEPTOR NEGATIVE (HCC): ICD-10-CM

## 2024-07-31 DIAGNOSIS — I10 ESSENTIAL HYPERTENSION, BENIGN: Primary | ICD-10-CM

## 2024-07-31 DIAGNOSIS — I10 ESSENTIAL HYPERTENSION, BENIGN: ICD-10-CM

## 2024-07-31 PROBLEM — E66.01 SEVERE OBESITY (BMI 35.0-39.9) WITH COMORBIDITY (HCC): Status: RESOLVED | Noted: 2018-05-24 | Resolved: 2024-07-31

## 2024-07-31 PROBLEM — F33.9 RECURRENT DEPRESSION (HCC): Status: RESOLVED | Noted: 2018-01-03 | Resolved: 2024-07-31

## 2024-07-31 LAB
ALBUMIN SERPL-MCNC: 3.7 G/DL (ref 3.2–4.6)
ALBUMIN/GLOB SERPL: 1.2 (ref 1–1.9)
ALP SERPL-CCNC: 92 U/L (ref 35–104)
ALT SERPL-CCNC: 21 U/L (ref 12–65)
ANION GAP SERPL CALC-SCNC: 10 MMOL/L (ref 9–18)
APPEARANCE UR: NORMAL
AST SERPL-CCNC: 31 U/L (ref 15–37)
BACTERIA URNS QL MICRO: 0 /HPF
BASOPHILS # BLD: 0.1 K/UL (ref 0–0.2)
BASOPHILS NFR BLD: 1 % (ref 0–2)
BILIRUB SERPL-MCNC: 0.3 MG/DL (ref 0–1.2)
BILIRUB UR QL: NEGATIVE
BUN SERPL-MCNC: 14 MG/DL (ref 8–23)
CALCIUM SERPL-MCNC: 10.6 MG/DL (ref 8.8–10.2)
CHLORIDE SERPL-SCNC: 102 MMOL/L (ref 98–107)
CHOLEST SERPL-MCNC: 138 MG/DL (ref 0–200)
CO2 SERPL-SCNC: 27 MMOL/L (ref 20–28)
COLOR UR: NORMAL
CREAT SERPL-MCNC: 0.59 MG/DL (ref 0.6–1.1)
DIFFERENTIAL METHOD BLD: NORMAL
EOSINOPHIL # BLD: 0.3 K/UL (ref 0–0.8)
EOSINOPHIL NFR BLD: 4 % (ref 0.5–7.8)
ERYTHROCYTE [DISTWIDTH] IN BLOOD BY AUTOMATED COUNT: 12.9 % (ref 11.9–14.6)
EST. AVERAGE GLUCOSE BLD GHB EST-MCNC: 149 MG/DL
GLOBULIN SER CALC-MCNC: 3 G/DL (ref 2.3–3.5)
GLUCOSE SERPL-MCNC: 121 MG/DL (ref 70–99)
GLUCOSE UR STRIP.AUTO-MCNC: NEGATIVE MG/DL
HBA1C MFR BLD: 6.8 % (ref 0–5.6)
HCT VFR BLD AUTO: 42.2 % (ref 35.8–46.3)
HDLC SERPL-MCNC: 65 MG/DL (ref 40–60)
HDLC SERPL: 2.1 (ref 0–5)
HGB UR QL STRIP: NEGATIVE
IMM GRANULOCYTES # BLD AUTO: 0 K/UL (ref 0–0.5)
IMM GRANULOCYTES NFR BLD AUTO: 0 % (ref 0–5)
KETONES UR QL STRIP.AUTO: NEGATIVE MG/DL
LDLC SERPL CALC-MCNC: 61 MG/DL (ref 0–100)
LEUKOCYTE ESTERASE UR QL STRIP.AUTO: NEGATIVE
LYMPHOCYTES # BLD: 2.6 K/UL (ref 0.5–4.6)
LYMPHOCYTES NFR BLD: 37 % (ref 13–44)
MAGNESIUM SERPL-MCNC: 2.1 MG/DL (ref 1.8–2.4)
MCH RBC QN AUTO: 31.5 PG (ref 26.1–32.9)
MCHC RBC AUTO-ENTMCNC: 31.8 G/DL (ref 31.4–35)
MCV RBC AUTO: 99.3 FL (ref 82–102)
MONOCYTES # BLD: 0.6 K/UL (ref 0.1–1.3)
MONOCYTES NFR BLD: 9 % (ref 4–12)
NEUTS SEG # BLD: 3.5 K/UL (ref 1.7–8.2)
NEUTS SEG NFR BLD: 49 % (ref 43–78)
NITRITE UR QL STRIP.AUTO: NEGATIVE
NRBC # BLD: 0 K/UL (ref 0–0.2)
PH UR STRIP: 6.5 (ref 5–9)
PLATELET # BLD AUTO: 261 K/UL (ref 150–450)
PMV BLD AUTO: 11.2 FL (ref 9.4–12.3)
POTASSIUM SERPL-SCNC: 4.2 MMOL/L (ref 3.5–5.1)
PROT SERPL-MCNC: 6.7 G/DL (ref 6.3–8.2)
PROT UR STRIP-MCNC: NEGATIVE MG/DL
RBC # BLD AUTO: 4.25 M/UL (ref 4.05–5.2)
SODIUM SERPL-SCNC: 139 MMOL/L (ref 136–145)
SP GR UR REFRACTOMETRY: 1.01 (ref 1–1.02)
TRIGL SERPL-MCNC: 60 MG/DL (ref 0–150)
UROBILINOGEN UR QL STRIP.AUTO: 0.2 EU/DL (ref 0.2–1)
VLDLC SERPL CALC-MCNC: 12 MG/DL (ref 6–23)
WBC # BLD AUTO: 7 K/UL (ref 4.3–11.1)

## 2024-07-31 PROCEDURE — G8417 CALC BMI ABV UP PARAM F/U: HCPCS | Performed by: INTERNAL MEDICINE

## 2024-07-31 PROCEDURE — 1036F TOBACCO NON-USER: CPT | Performed by: INTERNAL MEDICINE

## 2024-07-31 PROCEDURE — 1123F ACP DISCUSS/DSCN MKR DOCD: CPT | Performed by: INTERNAL MEDICINE

## 2024-07-31 PROCEDURE — 3044F HG A1C LEVEL LT 7.0%: CPT | Performed by: INTERNAL MEDICINE

## 2024-07-31 PROCEDURE — G8427 DOCREV CUR MEDS BY ELIG CLIN: HCPCS | Performed by: INTERNAL MEDICINE

## 2024-07-31 PROCEDURE — 1090F PRES/ABSN URINE INCON ASSESS: CPT | Performed by: INTERNAL MEDICINE

## 2024-07-31 PROCEDURE — 99214 OFFICE O/P EST MOD 30 MIN: CPT | Performed by: INTERNAL MEDICINE

## 2024-07-31 SDOH — ECONOMIC STABILITY: FOOD INSECURITY: WITHIN THE PAST 12 MONTHS, THE FOOD YOU BOUGHT JUST DIDN'T LAST AND YOU DIDN'T HAVE MONEY TO GET MORE.: NEVER TRUE

## 2024-07-31 SDOH — ECONOMIC STABILITY: FOOD INSECURITY: WITHIN THE PAST 12 MONTHS, YOU WORRIED THAT YOUR FOOD WOULD RUN OUT BEFORE YOU GOT MONEY TO BUY MORE.: NEVER TRUE

## 2024-07-31 SDOH — ECONOMIC STABILITY: INCOME INSECURITY: HOW HARD IS IT FOR YOU TO PAY FOR THE VERY BASICS LIKE FOOD, HOUSING, MEDICAL CARE, AND HEATING?: NOT VERY HARD

## 2024-07-31 NOTE — PROGRESS NOTES
7/31/2024 11:46 AM  Location:[facility] PHYSICIAN SERVICES  [facility] [place]  [place]  Patient #:  [id number]  YOB: 1935          YOUR LAST HEMOGLOBIN A1CS:   No results found for: \"HBA1C\", \"[id number]\"    YOUR LAST LIPID PROFILE:   Lab Results   Component Value Date/Time    CHOL 137 07/20/2023 02:45 PM    HDL 64 07/20/2023 02:45 PM    LDL 58.4 07/20/2023 02:45 PM    VLDL 14.6 07/20/2023 02:45 PM    VLDL 29 05/05/2021 03:06 PM         Lab Results   Component Value Date/Time    GFRAA >60 07/29/2022 02:31 PM    BUN 11 01/24/2024 12:21 PM     01/24/2024 12:21 PM    K 3.4 01/24/2024 12:21 PM     01/24/2024 12:21 PM    CO2 29 01/24/2024 12:21 PM           History of Present Illness     Chief Complaint   Patient presents with   • 6 Month Follow-Up     Pt presents to the office today for a 6 month follow-up     • Discuss Medications     Having a hard time taking the potassium. Its usually the second dose.    • Lower Back Pain     Have low back pain on the right side; its daily. It's usually when she bend over to do things.   • Head Problem     For the last few days in her head she can hear like a clicking or a swooshing sound.     This patient says she is having difficulty swallowing potassium at night.  Blood pressures are not usually checked at home but when they are checked systolics seem to be stable around 150.  She continues to have low back pain that is problematic and at times seems to radiate around the right flank.  She denies any dysuria or hematuria.    Ms. Farr is a 89 y.o. female  who presents for office visit      Allergies   Allergen Reactions   • Alendronate Itching   • Aspirin Other (See Comments)     Pt reports she can not take due to internal bleeding    • Naproxen Sodium Nausea Only     Past Medical History:   Diagnosis Date   • Acute bronchitis    • Anxiety state, unspecified    • Arthritis    • Cancer (HCC) 12/13    left breast   • Chest pain, unspecified    •

## 2024-08-21 ENCOUNTER — TELEPHONE (OUTPATIENT)
Dept: INTERNAL MEDICINE CLINIC | Facility: CLINIC | Age: 89
End: 2024-08-21

## 2024-08-21 DIAGNOSIS — I10 ESSENTIAL HYPERTENSION, BENIGN: Primary | ICD-10-CM

## 2024-08-21 NOTE — TELEPHONE ENCOUNTER
I reviewed the patients blood pressure from home, we need to add Losartan 25 mg #30 rfx3 1 day, submit blood pressure readings after 2 weeks cms she can decrease potassium to once a day, repeat bmp in 4 weeks , ordered cms

## 2024-08-22 RX ORDER — LOSARTAN POTASSIUM 25 MG/1
25 TABLET ORAL DAILY
Qty: 30 TABLET | Refills: 3 | Status: SHIPPED | OUTPATIENT
Start: 2024-08-22

## 2024-08-22 NOTE — TELEPHONE ENCOUNTER
Pt notified   [facility]

## 2024-08-22 NOTE — TELEPHONE ENCOUNTER
I have attempted without success to contact this patient by phone to discuss Bp Readings.  Will try again later.

## 2024-08-22 NOTE — TELEPHONE ENCOUNTER
Pt returned your call and the message regarding Losartan was relayed. Labs were scheduled. Pt wants to know if this medication is going to be long term or not because that will determine which pharmacy she wants it sent to.Call back # is [phone]

## 2024-09-19 DIAGNOSIS — I10 ESSENTIAL HYPERTENSION, BENIGN: ICD-10-CM

## 2024-09-19 LAB
ANION GAP SERPL CALC-SCNC: 11 MMOL/L (ref 9–18)
BUN SERPL-MCNC: 16 MG/DL (ref 8–23)
CALCIUM SERPL-MCNC: 10.7 MG/DL (ref 8.8–10.2)
CHLORIDE SERPL-SCNC: 103 MMOL/L (ref 98–107)
CO2 SERPL-SCNC: 26 MMOL/L (ref 20–28)
CREAT SERPL-MCNC: 0.59 MG/DL (ref 0.6–1.1)
GLUCOSE SERPL-MCNC: 130 MG/DL (ref 70–99)
POTASSIUM SERPL-SCNC: 4.3 MMOL/L (ref 3.5–5.1)
SODIUM SERPL-SCNC: 139 MMOL/L (ref 136–145)

## 2024-09-20 ENCOUNTER — TELEPHONE (OUTPATIENT)
Dept: INTERNAL MEDICINE CLINIC | Facility: CLINIC | Age: 89
End: 2024-09-20

## 2024-10-16 ENCOUNTER — TELEPHONE (OUTPATIENT)
Dept: INTERNAL MEDICINE CLINIC | Facility: CLINIC | Age: 89
End: 2024-10-16

## 2024-10-16 DIAGNOSIS — I10 ESSENTIAL HYPERTENSION, BENIGN: ICD-10-CM

## 2024-10-16 RX ORDER — LOSARTAN POTASSIUM 50 MG/1
50 TABLET ORAL DAILY
Qty: 90 TABLET | Refills: 3 | Status: SHIPPED | OUTPATIENT
Start: 2024-10-16

## 2024-10-16 RX ORDER — LOSARTAN POTASSIUM 25 MG/1
25 TABLET ORAL DAILY
Qty: 30 TABLET | Refills: 3 | Status: CANCELLED | OUTPATIENT
Start: 2024-10-16

## 2024-10-16 NOTE — TELEPHONE ENCOUNTER
The patient called needing a refill on her medication her losartan 25 mg written take 25 mg tablet one time daily the pharmacy told her to take 2 tablets daily now she out of her medication. Patient would like a call back when sent in

## 2024-10-16 NOTE — TELEPHONE ENCOUNTER
Called and spoke with pt she states her bp readings are as follow after cms increased her losartan to 50 mg daily.      9/20/2024-  138/73  9/21/2024  -163/79  9/22/2024-  178/86  9/23/2024-  74/90  9/25/2024-  171/87  9/27/2024- 187/82    Pt will need a new rx for the losartan 50 mg daily if the doctor wants to keep her on this dose. Rx pending below

## 2024-11-14 ENCOUNTER — OFFICE VISIT (OUTPATIENT)
Dept: INTERNAL MEDICINE CLINIC | Facility: CLINIC | Age: 89
End: 2024-11-14

## 2024-11-14 VITALS
RESPIRATION RATE: 18 BRPM | TEMPERATURE: 97 F | BODY MASS INDEX: 30.48 KG/M2 | HEIGHT: 59 IN | SYSTOLIC BLOOD PRESSURE: 142 MMHG | WEIGHT: 151.2 LBS | OXYGEN SATURATION: 98 % | DIASTOLIC BLOOD PRESSURE: 65 MMHG | HEART RATE: 66 BPM

## 2024-11-14 DIAGNOSIS — I10 ESSENTIAL HYPERTENSION, BENIGN: Primary | ICD-10-CM

## 2024-11-14 DIAGNOSIS — R01.1 HEART MURMUR: ICD-10-CM

## 2024-11-14 RX ORDER — LOSARTAN POTASSIUM 50 MG/1
50 TABLET ORAL DAILY
Qty: 90 TABLET | Refills: 3 | Status: SHIPPED | OUTPATIENT
Start: 2024-11-14

## 2024-11-14 ASSESSMENT — PATIENT HEALTH QUESTIONNAIRE - PHQ9
4. FEELING TIRED OR HAVING LITTLE ENERGY: SEVERAL DAYS
9. THOUGHTS THAT YOU WOULD BE BETTER OFF DEAD, OR OF HURTING YOURSELF: NOT AT ALL
8. MOVING OR SPEAKING SO SLOWLY THAT OTHER PEOPLE COULD HAVE NOTICED. OR THE OPPOSITE, BEING SO FIGETY OR RESTLESS THAT YOU HAVE BEEN MOVING AROUND A LOT MORE THAN USUAL: SEVERAL DAYS
SUM OF ALL RESPONSES TO PHQ QUESTIONS 1-9: 3
SUM OF ALL RESPONSES TO PHQ QUESTIONS 1-9: 3
3. TROUBLE FALLING OR STAYING ASLEEP: SEVERAL DAYS
SUM OF ALL RESPONSES TO PHQ QUESTIONS 1-9: 3
SUM OF ALL RESPONSES TO PHQ9 QUESTIONS 1 & 2: 0
10. IF YOU CHECKED OFF ANY PROBLEMS, HOW DIFFICULT HAVE THESE PROBLEMS MADE IT FOR YOU TO DO YOUR WORK, TAKE CARE OF THINGS AT HOME, OR GET ALONG WITH OTHER PEOPLE: NOT DIFFICULT AT ALL
1. LITTLE INTEREST OR PLEASURE IN DOING THINGS: NOT AT ALL
7. TROUBLE CONCENTRATING ON THINGS, SUCH AS READING THE NEWSPAPER OR WATCHING TELEVISION: NOT AT ALL
5. POOR APPETITE OR OVEREATING: NOT AT ALL
SUM OF ALL RESPONSES TO PHQ QUESTIONS 1-9: 3
2. FEELING DOWN, DEPRESSED OR HOPELESS: NOT AT ALL

## 2024-11-14 ASSESSMENT — ENCOUNTER SYMPTOMS
SHORTNESS OF BREATH: 0
COUGH: 0

## 2024-11-14 NOTE — PROGRESS NOTES
loratadine (CLARITIN) 10 MG tablet Take 1 tablet by mouth daily       No current facility-administered medications for this visit.     Health Maintenance   Topic Date Due    DTaP/Tdap/Td vaccine (1 - Tdap) Never done    Respiratory Syncytial Virus (RSV) Pregnant or age 60 yrs+ (1 - 1-dose 60+ series) Never done    COVID-19 Vaccine (7 - 2023-24 season) 01/06/2025    Depression Monitoring  01/24/2025    Lipids  07/31/2025    Annual Wellness Visit (Medicare Advantage)  Completed    Flu vaccine  Completed    Shingles vaccine  Completed    Pneumococcal 65+ years Vaccine  Completed    Hepatitis A vaccine  Aged Out    Hepatitis B vaccine  Aged Out    Hib vaccine  Aged Out    Polio vaccine  Aged Out    Meningococcal (ACWY) vaccine  Aged Out     Family History   Problem Relation Age of Onset    Cancer Maternal Grandmother         breast at old age    Lung Disease Mother     Alzheimer's Disease Mother     Cancer Father     Diabetes Brother     Lung Disease Brother     Alcohol Abuse Brother              Review of Systems  Review of Systems   Respiratory:  Negative for cough and shortness of breath.    Cardiovascular:  Positive for leg swelling (occasional). Negative for chest pain.   Neurological:  Negative for dizziness and headaches.   All other systems reviewed and are negative.    Vitals:    11/14/24 1348   BP: (!) 142/65   Pulse:    Resp:    Temp:    SpO2:    First blood pressure 148/67, pulse 66, weight 151, BMI 30.28, height 411, temp 97, respirations 18, SpO2 98%      Physical Exam    Physical Exam  Vitals and nursing note reviewed.   Constitutional:       General: She is not in acute distress.     Appearance: Normal appearance. She is not ill-appearing.   HENT:      Head: Normocephalic and atraumatic.   Eyes:      Extraocular Movements: Extraocular movements intact.      Pupils: Pupils are equal, round, and reactive to light.   Neck:      Vascular: No carotid bruit.   Cardiovascular:      Rate and Rhythm: Normal

## 2024-12-11 RX ORDER — ATORVASTATIN CALCIUM 40 MG/1
40 TABLET, FILM COATED ORAL NIGHTLY
Qty: 90 TABLET | Refills: 3 | OUTPATIENT
Start: 2024-12-11

## 2024-12-11 RX ORDER — PAROXETINE 20 MG/1
20 TABLET, FILM COATED ORAL DAILY
Qty: 90 TABLET | Refills: 3 | OUTPATIENT
Start: 2024-12-11

## 2024-12-11 RX ORDER — HYDROCHLOROTHIAZIDE 25 MG/1
25 TABLET ORAL DAILY
Qty: 90 TABLET | Refills: 3 | OUTPATIENT
Start: 2024-12-11

## 2024-12-11 RX ORDER — AMLODIPINE BESYLATE 5 MG/1
5 TABLET ORAL DAILY
Qty: 90 TABLET | Refills: 3 | OUTPATIENT
Start: 2024-12-11

## 2024-12-12 ENCOUNTER — TELEPHONE (OUTPATIENT)
Dept: INTERNAL MEDICINE CLINIC | Facility: CLINIC | Age: 88
End: 2024-12-12

## 2024-12-12 DIAGNOSIS — M81.0 OSTEOPOROSIS, UNSPECIFIED OSTEOPOROSIS TYPE, UNSPECIFIED PATHOLOGICAL FRACTURE PRESENCE: Primary | ICD-10-CM

## 2024-12-12 RX ORDER — ATORVASTATIN CALCIUM 40 MG/1
40 TABLET, FILM COATED ORAL NIGHTLY
Qty: 90 TABLET | Refills: 3 | Status: CANCELLED | OUTPATIENT
Start: 2024-12-12

## 2024-12-12 RX ORDER — AMLODIPINE BESYLATE 5 MG/1
5 TABLET ORAL DAILY
Qty: 90 TABLET | Refills: 3 | Status: CANCELLED | OUTPATIENT
Start: 2024-12-12

## 2024-12-12 RX ORDER — ATORVASTATIN CALCIUM 40 MG/1
40 TABLET, FILM COATED ORAL NIGHTLY
Qty: 90 TABLET | Refills: 3 | Status: SHIPPED | OUTPATIENT
Start: 2024-12-12

## 2024-12-12 RX ORDER — HYDROCHLOROTHIAZIDE 25 MG/1
12.5 TABLET ORAL DAILY
Qty: 90 TABLET | Refills: 3 | Status: CANCELLED | OUTPATIENT
Start: 2024-12-12

## 2024-12-12 RX ORDER — HYDROCHLOROTHIAZIDE 25 MG/1
12.5 TABLET ORAL DAILY
Qty: 90 TABLET | Refills: 3 | Status: SHIPPED | OUTPATIENT
Start: 2024-12-12

## 2024-12-12 RX ORDER — AMLODIPINE BESYLATE 5 MG/1
5 TABLET ORAL DAILY
Qty: 90 TABLET | Refills: 3 | Status: SHIPPED | OUTPATIENT
Start: 2024-12-12

## 2024-12-12 RX ORDER — DENOSUMAB 60 MG/ML
60 INJECTION SUBCUTANEOUS
Qty: 1 EACH | Refills: 1 | Status: SHIPPED | OUTPATIENT
Start: 2024-12-12

## 2024-12-12 RX ORDER — PAROXETINE 20 MG/1
20 TABLET, FILM COATED ORAL DAILY
Qty: 90 TABLET | Refills: 3 | Status: SHIPPED | OUTPATIENT
Start: 2024-12-12

## 2024-12-12 RX ORDER — POTASSIUM CHLORIDE 1500 MG/1
20 TABLET, EXTENDED RELEASE ORAL DAILY
Qty: 90 TABLET | Refills: 3 | Status: SHIPPED | OUTPATIENT
Start: 2024-12-12

## 2024-12-12 RX ORDER — PAROXETINE 20 MG/1
20 TABLET, FILM COATED ORAL DAILY
Qty: 90 TABLET | Refills: 3 | Status: CANCELLED | OUTPATIENT
Start: 2024-12-12

## 2024-12-12 RX ORDER — DENOSUMAB 60 MG/ML
60 INJECTION SUBCUTANEOUS
Qty: 1 EACH | Refills: 1 | Status: SHIPPED | OUTPATIENT
Start: 2024-12-12 | End: 2024-12-12 | Stop reason: SDUPTHER

## 2025-01-31 ENCOUNTER — OFFICE VISIT (OUTPATIENT)
Dept: INTERNAL MEDICINE CLINIC | Facility: CLINIC | Age: 89
End: 2025-01-31

## 2025-01-31 VITALS
SYSTOLIC BLOOD PRESSURE: 130 MMHG | TEMPERATURE: 97.6 F | WEIGHT: 146 LBS | DIASTOLIC BLOOD PRESSURE: 80 MMHG | HEIGHT: 59 IN | BODY MASS INDEX: 29.43 KG/M2 | HEART RATE: 65 BPM | OXYGEN SATURATION: 99 %

## 2025-01-31 DIAGNOSIS — Z00.00 MEDICARE ANNUAL WELLNESS VISIT, SUBSEQUENT: Primary | ICD-10-CM

## 2025-01-31 DIAGNOSIS — Z17.1 MALIGNANT NEOPLASM OF OVERLAPPING SITES OF LEFT BREAST IN FEMALE, ESTROGEN RECEPTOR NEGATIVE (HCC): ICD-10-CM

## 2025-01-31 DIAGNOSIS — E55.9 VITAMIN D DEFICIENCY: ICD-10-CM

## 2025-01-31 DIAGNOSIS — E11.69 TYPE 2 DIABETES MELLITUS WITH OTHER SPECIFIED COMPLICATION, WITHOUT LONG-TERM CURRENT USE OF INSULIN (HCC): ICD-10-CM

## 2025-01-31 DIAGNOSIS — F33.1 MODERATE EPISODE OF RECURRENT MAJOR DEPRESSIVE DISORDER (HCC): ICD-10-CM

## 2025-01-31 DIAGNOSIS — I10 ESSENTIAL HYPERTENSION, BENIGN: ICD-10-CM

## 2025-01-31 DIAGNOSIS — C50.812 MALIGNANT NEOPLASM OF OVERLAPPING SITES OF LEFT BREAST IN FEMALE, ESTROGEN RECEPTOR NEGATIVE (HCC): ICD-10-CM

## 2025-01-31 DIAGNOSIS — F33.1 MAJOR DEPRESSIVE DISORDER, RECURRENT, MODERATE (HCC): ICD-10-CM

## 2025-01-31 DIAGNOSIS — E78.2 MIXED HYPERLIPIDEMIA: ICD-10-CM

## 2025-01-31 DIAGNOSIS — M81.6 LOCALIZED OSTEOPOROSIS, UNSPECIFIED PATHOLOGICAL FRACTURE PRESENCE: ICD-10-CM

## 2025-01-31 DIAGNOSIS — C50.912 MALIGNANT NEOPLASM OF LEFT FEMALE BREAST, UNSPECIFIED ESTROGEN RECEPTOR STATUS, UNSPECIFIED SITE OF BREAST (HCC): ICD-10-CM

## 2025-01-31 DIAGNOSIS — R73.9 HYPERGLYCEMIA: ICD-10-CM

## 2025-01-31 DIAGNOSIS — M54.6 THORACIC BACK PAIN, UNSPECIFIED BACK PAIN LATERALITY, UNSPECIFIED CHRONICITY: ICD-10-CM

## 2025-01-31 LAB
25(OH)D3 SERPL-MCNC: 37.3 NG/ML (ref 30–100)
ANION GAP SERPL CALC-SCNC: 12 MMOL/L (ref 7–16)
BUN SERPL-MCNC: 12 MG/DL (ref 8–23)
CALCIUM SERPL-MCNC: 9.9 MG/DL (ref 8.8–10.2)
CHLORIDE SERPL-SCNC: 102 MMOL/L (ref 98–107)
CO2 SERPL-SCNC: 28 MMOL/L (ref 20–29)
CREAT SERPL-MCNC: 0.54 MG/DL (ref 0.6–1.1)
EST. AVERAGE GLUCOSE BLD GHB EST-MCNC: 147 MG/DL
GLUCOSE SERPL-MCNC: 130 MG/DL (ref 70–99)
HBA1C MFR BLD: 6.7 % (ref 0–5.6)
POTASSIUM SERPL-SCNC: 4.1 MMOL/L (ref 3.5–5.1)
SODIUM SERPL-SCNC: 141 MMOL/L (ref 136–145)

## 2025-01-31 SDOH — ECONOMIC STABILITY: FOOD INSECURITY: WITHIN THE PAST 12 MONTHS, THE FOOD YOU BOUGHT JUST DIDN'T LAST AND YOU DIDN'T HAVE MONEY TO GET MORE.: NEVER TRUE

## 2025-01-31 SDOH — ECONOMIC STABILITY: FOOD INSECURITY: WITHIN THE PAST 12 MONTHS, YOU WORRIED THAT YOUR FOOD WOULD RUN OUT BEFORE YOU GOT MONEY TO BUY MORE.: NEVER TRUE

## 2025-01-31 ASSESSMENT — LIFESTYLE VARIABLES
HOW MANY STANDARD DRINKS CONTAINING ALCOHOL DO YOU HAVE ON A TYPICAL DAY: PATIENT DOES NOT DRINK
HOW OFTEN DO YOU HAVE A DRINK CONTAINING ALCOHOL: NEVER

## 2025-01-31 ASSESSMENT — PATIENT HEALTH QUESTIONNAIRE - PHQ9
7. TROUBLE CONCENTRATING ON THINGS, SUCH AS READING THE NEWSPAPER OR WATCHING TELEVISION: NOT AT ALL
4. FEELING TIRED OR HAVING LITTLE ENERGY: SEVERAL DAYS
SUM OF ALL RESPONSES TO PHQ QUESTIONS 1-9: 1
SUM OF ALL RESPONSES TO PHQ QUESTIONS 1-9: 1
5. POOR APPETITE OR OVEREATING: NOT AT ALL
6. FEELING BAD ABOUT YOURSELF - OR THAT YOU ARE A FAILURE OR HAVE LET YOURSELF OR YOUR FAMILY DOWN: NOT AT ALL
1. LITTLE INTEREST OR PLEASURE IN DOING THINGS: NOT AT ALL
SUM OF ALL RESPONSES TO PHQ9 QUESTIONS 1 & 2: 0
8. MOVING OR SPEAKING SO SLOWLY THAT OTHER PEOPLE COULD HAVE NOTICED. OR THE OPPOSITE, BEING SO FIGETY OR RESTLESS THAT YOU HAVE BEEN MOVING AROUND A LOT MORE THAN USUAL: NOT AT ALL
2. FEELING DOWN, DEPRESSED OR HOPELESS: NOT AT ALL
3. TROUBLE FALLING OR STAYING ASLEEP: NOT AT ALL
9. THOUGHTS THAT YOU WOULD BE BETTER OFF DEAD, OR OF HURTING YOURSELF: NOT AT ALL
SUM OF ALL RESPONSES TO PHQ QUESTIONS 1-9: 1
10. IF YOU CHECKED OFF ANY PROBLEMS, HOW DIFFICULT HAVE THESE PROBLEMS MADE IT FOR YOU TO DO YOUR WORK, TAKE CARE OF THINGS AT HOME, OR GET ALONG WITH OTHER PEOPLE: NOT DIFFICULT AT ALL
SUM OF ALL RESPONSES TO PHQ QUESTIONS 1-9: 1

## 2025-01-31 NOTE — PROGRESS NOTES
1/31/2025 2:22 PM  Location:[facility] PHYSICIAN SERVICES  [facility]  [place]  Patient #:  [id number]  YOB: 1935          YOUR LAST HEMOGLOBIN A1CS:   No results found for: \"HBA1C\", \"RCP0PLNI\"    YOUR LAST LIPID PROFILE:   Lab Results   Component Value Date/Time    CHOL 138 07/31/2024 12:09 PM    HDL 65 07/31/2024 12:09 PM    LDL 61 07/31/2024 12:09 PM    LDL 58.4 07/20/2023 02:45 PM    VLDL 12 07/31/2024 12:09 PM    VLDL 29 05/05/2021 03:06 PM         Lab Results   Component Value Date/Time    GFRAA >60 07/29/2022 02:31 PM    BUN 16 09/19/2024 10:56 AM     09/19/2024 10:56 AM    K 4.3 09/19/2024 10:56 AM     09/19/2024 10:56 AM    CO2 26 09/19/2024 10:56 AM           History of Present Illness     Chief Complaint   Patient presents with    Medicare AWV     She had a faint after a recent URI, EMS    6 Month Follow-Up     This patient is brought in by her caregiver.  She had an episode where she fainted approximately a month ago following a URI episode.  EMS was called and she was fully checked out for approximately 30 minutes and was felt to be stable was not taken to the emergency room.  She has had no recurrence of the symptoms and she feels that she was dehydrated.    Ms. Farr is a 89 y.o. female  who presents for office visit      Allergies   Allergen Reactions    Alendronate Itching    Aspirin Other (See Comments)     Pt reports she can not take due to internal bleeding     Naproxen Sodium Nausea Only     Past Medical History:   Diagnosis Date    Acute bronchitis     Anxiety state, unspecified     Arthritis     Cancer (HCC) 12/13    left breast    Chest pain, unspecified     Chronic pain     right shoulder, back     Depression     Enthesopathy of unspecified site 5/13/2015    Essential hypertension, benign 5/13/2015    GERD (gastroesophageal reflux disease)     no longer has    GI bleed      - during hospital stay had 3 units blood     Hypercholesterolemia

## 2025-02-04 ENCOUNTER — TELEPHONE (OUTPATIENT)
Dept: INTERNAL MEDICINE CLINIC | Facility: CLINIC | Age: 89
End: 2025-02-04

## 2025-02-04 NOTE — TELEPHONE ENCOUNTER
Simran marie from [facility] fax #[phone] at [facility] stated they do not have recent labs last are from July and asked to have the recent BMP faxed over. Faxed.   88 moderate assist (50% patients effort)/minimum assist (75% patients effort)

## 2025-05-15 ENCOUNTER — OFFICE VISIT (OUTPATIENT)
Dept: INTERNAL MEDICINE CLINIC | Facility: CLINIC | Age: 89
End: 2025-05-15
Payer: MEDICARE

## 2025-05-15 VITALS
OXYGEN SATURATION: 93 % | BODY MASS INDEX: 28.1 KG/M2 | DIASTOLIC BLOOD PRESSURE: 67 MMHG | WEIGHT: 139.4 LBS | HEIGHT: 59 IN | SYSTOLIC BLOOD PRESSURE: 147 MMHG | TEMPERATURE: 97.5 F | RESPIRATION RATE: 16 BRPM | HEART RATE: 85 BPM

## 2025-05-15 DIAGNOSIS — J40 BRONCHITIS: Primary | ICD-10-CM

## 2025-05-15 DIAGNOSIS — J40 BRONCHITIS: ICD-10-CM

## 2025-05-15 LAB
ANION GAP SERPL CALC-SCNC: 10 MMOL/L (ref 7–16)
BASOPHILS # BLD: 0.03 K/UL (ref 0–0.2)
BASOPHILS NFR BLD: 0.4 % (ref 0–2)
BUN SERPL-MCNC: 17 MG/DL (ref 8–23)
CALCIUM SERPL-MCNC: 10.9 MG/DL (ref 8.8–10.2)
CHLORIDE SERPL-SCNC: 101 MMOL/L (ref 98–107)
CO2 SERPL-SCNC: 28 MMOL/L (ref 20–29)
CREAT SERPL-MCNC: 0.75 MG/DL (ref 0.6–1.1)
DIFFERENTIAL METHOD BLD: ABNORMAL
EOSINOPHIL # BLD: 0.28 K/UL (ref 0–0.8)
EOSINOPHIL NFR BLD: 3.7 % (ref 0.5–7.8)
ERYTHROCYTE [DISTWIDTH] IN BLOOD BY AUTOMATED COUNT: 13.7 % (ref 11.9–14.6)
EXP DATE SOLUTION: NORMAL
EXP DATE SWAB: NORMAL
EXPIRATION DATE: NORMAL
GLUCOSE SERPL-MCNC: 136 MG/DL (ref 70–99)
HCT VFR BLD AUTO: 37 % (ref 35.8–46.3)
HGB BLD-MCNC: 11.9 G/DL (ref 11.7–15.4)
IMM GRANULOCYTES # BLD AUTO: 0.02 K/UL (ref 0–0.5)
IMM GRANULOCYTES NFR BLD AUTO: 0.3 % (ref 0–5)
INFLUENZA A ANTIGEN, POC: NEGATIVE
INFLUENZA B ANTIGEN, POC: NEGATIVE
LOT NUMBER POC: NORMAL
LOT NUMBER SOLUTION: NORMAL
LOT NUMBER SWAB: NORMAL
LYMPHOCYTES # BLD: 2.54 K/UL (ref 0.5–4.6)
LYMPHOCYTES NFR BLD: 33.4 % (ref 13–44)
MCH RBC QN AUTO: 30.7 PG (ref 26.1–32.9)
MCHC RBC AUTO-ENTMCNC: 32.2 G/DL (ref 31.4–35)
MCV RBC AUTO: 95.6 FL (ref 82–102)
MONOCYTES # BLD: 0.56 K/UL (ref 0.1–1.3)
MONOCYTES NFR BLD: 7.4 % (ref 4–12)
NEUTS SEG # BLD: 4.17 K/UL (ref 1.7–8.2)
NEUTS SEG NFR BLD: 54.8 % (ref 43–78)
NRBC # BLD: 0 K/UL (ref 0–0.2)
PLATELET # BLD AUTO: 301 K/UL (ref 150–450)
PMV BLD AUTO: 10.8 FL (ref 9.4–12.3)
POTASSIUM SERPL-SCNC: 4.8 MMOL/L (ref 3.5–5.1)
RBC # BLD AUTO: 3.87 M/UL (ref 4.05–5.2)
RSV, POC: NEGATIVE
SARS-COV-2 RNA, POC: NEGATIVE
SODIUM SERPL-SCNC: 139 MMOL/L (ref 136–145)
VALID INTERNAL CONTROL, POC: YES
VALID INTERNAL CONTROL: YES
WBC # BLD AUTO: 7.6 K/UL (ref 4.3–11.1)

## 2025-05-15 PROCEDURE — 1123F ACP DISCUSS/DSCN MKR DOCD: CPT | Performed by: NURSE PRACTITIONER

## 2025-05-15 PROCEDURE — 87420 RESP SYNCYTIAL VIRUS AG IA: CPT | Performed by: NURSE PRACTITIONER

## 2025-05-15 PROCEDURE — 1159F MED LIST DOCD IN RCRD: CPT | Performed by: NURSE PRACTITIONER

## 2025-05-15 PROCEDURE — 1090F PRES/ABSN URINE INCON ASSESS: CPT | Performed by: NURSE PRACTITIONER

## 2025-05-15 PROCEDURE — G8417 CALC BMI ABV UP PARAM F/U: HCPCS | Performed by: NURSE PRACTITIONER

## 2025-05-15 PROCEDURE — 87804 INFLUENZA ASSAY W/OPTIC: CPT | Performed by: NURSE PRACTITIONER

## 2025-05-15 PROCEDURE — 1036F TOBACCO NON-USER: CPT | Performed by: NURSE PRACTITIONER

## 2025-05-15 PROCEDURE — 1160F RVW MEDS BY RX/DR IN RCRD: CPT | Performed by: NURSE PRACTITIONER

## 2025-05-15 PROCEDURE — 99214 OFFICE O/P EST MOD 30 MIN: CPT | Performed by: NURSE PRACTITIONER

## 2025-05-15 PROCEDURE — G8427 DOCREV CUR MEDS BY ELIG CLIN: HCPCS | Performed by: NURSE PRACTITIONER

## 2025-05-15 PROCEDURE — 87635 SARS-COV-2 COVID-19 AMP PRB: CPT | Performed by: NURSE PRACTITIONER

## 2025-05-15 RX ORDER — PREDNISONE 10 MG/1
10 TABLET ORAL 2 TIMES DAILY
Qty: 8 TABLET | Refills: 0 | Status: SHIPPED | OUTPATIENT
Start: 2025-05-15 | End: 2025-05-19

## 2025-05-15 RX ORDER — DOXYCYCLINE 100 MG/1
100 TABLET ORAL 2 TIMES DAILY
COMMUNITY
Start: 2025-05-09 | End: 2025-05-16

## 2025-05-15 RX ORDER — BROMPHENIRAMINE MALEATE, PSEUDOEPHEDRINE HYDROCHLORIDE, AND DEXTROMETHORPHAN HYDROBROMIDE 2; 30; 10 MG/5ML; MG/5ML; MG/5ML
5 SYRUP ORAL EVERY 6 HOURS PRN
COMMUNITY
Start: 2025-05-09

## 2025-05-15 RX ORDER — ALBUTEROL SULFATE 90 UG/1
2 INHALANT RESPIRATORY (INHALATION) EVERY 4 HOURS PRN
COMMUNITY
Start: 2025-05-09

## 2025-05-15 ASSESSMENT — ENCOUNTER SYMPTOMS
VOMITING: 0
NAUSEA: 0
WHEEZING: 1
DIARRHEA: 0
SINUS PAIN: 0
SINUS PRESSURE: 1
SHORTNESS OF BREATH: 0
COUGH: 1

## 2025-05-15 NOTE — PROGRESS NOTES
2024-25 season) 05/11/2025    Lipids  07/31/2025    Depression Monitoring  01/31/2026    Annual Wellness Visit (Medicare Advantage)  Completed    Flu vaccine  Completed    Shingles vaccine  Completed    Pneumococcal 50+ years Vaccine  Completed    Hepatitis A vaccine  Aged Out    Hepatitis B vaccine  Aged Out    Hib vaccine  Aged Out    Polio vaccine  Aged Out    Meningococcal (ACWY) vaccine  Aged Out    Meningococcal B vaccine  Aged Out     Family History   Problem Relation Age of Onset    Cancer Maternal Grandmother         breast at old age    Lung Disease Mother     Alzheimer's Disease Mother     Cancer Father     Diabetes Brother     Lung Disease Brother     Alcohol Abuse Brother              Review of Systems  Review of Systems   Constitutional:  Negative for chills and fever.   HENT:  Positive for congestion and sinus pressure. Negative for ear pain and sinus pain.    Respiratory:  Positive for cough (foamy and clear) and wheezing. Negative for shortness of breath (not new).    Cardiovascular:  Positive for leg swelling (at the end of the day only). Negative for chest pain and palpitations.   Gastrointestinal:  Negative for diarrhea, nausea and vomiting.   All other systems reviewed and are negative.    Vitals:    05/15/25 1457   BP: (!) 147/67   Pulse:    Resp:    Temp:    SpO2:    Blood pressure recheck 142/75, weight 139 pounds down 7 pounds since January, pulse 85 temp 97.5, respirations 16, SpO2 93% room air      Physical Exam    Physical Exam  Vitals and nursing note reviewed.   Constitutional:       General: She is not in acute distress.     Appearance: She is not ill-appearing, toxic-appearing or diaphoretic.   HENT:      Mouth/Throat:      Mouth: Mucous membranes are moist.   Cardiovascular:      Rate and Rhythm: Regular rhythm.   Pulmonary:      Effort: No respiratory distress.      Breath sounds: Wheezing (ruq) and rhonchi (RLL) present. No rales.      Comments: Oxygen saturations 90 to 96% on room

## 2025-05-16 ENCOUNTER — RESULTS FOLLOW-UP (OUTPATIENT)
Dept: INTERNAL MEDICINE CLINIC | Facility: CLINIC | Age: 89
End: 2025-05-16

## 2025-05-16 DIAGNOSIS — J40 BRONCHITIS: ICD-10-CM

## 2025-05-22 ENCOUNTER — OFFICE VISIT (OUTPATIENT)
Dept: INTERNAL MEDICINE CLINIC | Facility: CLINIC | Age: 89
End: 2025-05-22
Payer: MEDICARE

## 2025-05-22 VITALS
HEIGHT: 59 IN | WEIGHT: 140.2 LBS | HEART RATE: 80 BPM | RESPIRATION RATE: 16 BRPM | BODY MASS INDEX: 28.26 KG/M2 | SYSTOLIC BLOOD PRESSURE: 150 MMHG | TEMPERATURE: 97.8 F | DIASTOLIC BLOOD PRESSURE: 75 MMHG | OXYGEN SATURATION: 97 %

## 2025-05-22 DIAGNOSIS — E11.69 TYPE 2 DIABETES MELLITUS WITH OTHER SPECIFIED COMPLICATION, WITHOUT LONG-TERM CURRENT USE OF INSULIN (HCC): ICD-10-CM

## 2025-05-22 DIAGNOSIS — E83.52 HYPERCALCEMIA: Primary | ICD-10-CM

## 2025-05-22 DIAGNOSIS — J40 BRONCHITIS: ICD-10-CM

## 2025-05-22 DIAGNOSIS — E83.52 HYPERCALCEMIA: ICD-10-CM

## 2025-05-22 DIAGNOSIS — I10 ESSENTIAL HYPERTENSION, BENIGN: ICD-10-CM

## 2025-05-22 LAB
25(OH)D3 SERPL-MCNC: 43.7 NG/ML (ref 30–100)
ANION GAP SERPL CALC-SCNC: 11 MMOL/L (ref 7–16)
BUN SERPL-MCNC: 15 MG/DL (ref 8–23)
CALCIUM SERPL-MCNC: 10.9 MG/DL (ref 8.8–10.2)
CALCIUM SERPL-MCNC: 10.9 MG/DL (ref 8.8–10.2)
CHLORIDE SERPL-SCNC: 99 MMOL/L (ref 98–107)
CO2 SERPL-SCNC: 27 MMOL/L (ref 20–29)
CREAT SERPL-MCNC: 0.8 MG/DL (ref 0.6–1.1)
GLUCOSE SERPL-MCNC: 109 MG/DL (ref 70–99)
POTASSIUM SERPL-SCNC: 4.7 MMOL/L (ref 3.5–5.1)
PTH-INTACT SERPL-MCNC: 50.2 PG/ML (ref 15–65)
SODIUM SERPL-SCNC: 137 MMOL/L (ref 136–145)

## 2025-05-22 PROCEDURE — 1123F ACP DISCUSS/DSCN MKR DOCD: CPT | Performed by: NURSE PRACTITIONER

## 2025-05-22 PROCEDURE — 1090F PRES/ABSN URINE INCON ASSESS: CPT | Performed by: NURSE PRACTITIONER

## 2025-05-22 PROCEDURE — 1036F TOBACCO NON-USER: CPT | Performed by: NURSE PRACTITIONER

## 2025-05-22 PROCEDURE — 1160F RVW MEDS BY RX/DR IN RCRD: CPT | Performed by: NURSE PRACTITIONER

## 2025-05-22 PROCEDURE — 1159F MED LIST DOCD IN RCRD: CPT | Performed by: NURSE PRACTITIONER

## 2025-05-22 PROCEDURE — G8427 DOCREV CUR MEDS BY ELIG CLIN: HCPCS | Performed by: NURSE PRACTITIONER

## 2025-05-22 PROCEDURE — G8417 CALC BMI ABV UP PARAM F/U: HCPCS | Performed by: NURSE PRACTITIONER

## 2025-05-22 PROCEDURE — 3044F HG A1C LEVEL LT 7.0%: CPT | Performed by: NURSE PRACTITIONER

## 2025-05-22 PROCEDURE — 99213 OFFICE O/P EST LOW 20 MIN: CPT | Performed by: NURSE PRACTITIONER

## 2025-05-22 ASSESSMENT — ENCOUNTER SYMPTOMS
NAUSEA: 0
SHORTNESS OF BREATH: 0
COUGH: 1
CHEST TIGHTNESS: 0
VOMITING: 0
WHEEZING: 1

## 2025-05-22 NOTE — PROGRESS NOTES
Completed    Hepatitis A vaccine  Aged Out    Hepatitis B vaccine  Aged Out    Hib vaccine  Aged Out    Polio vaccine  Aged Out    Meningococcal (ACWY) vaccine  Aged Out    Meningococcal B vaccine  Aged Out     Family History   Problem Relation Age of Onset    Cancer Maternal Grandmother         breast at old age    Lung Disease Mother     Alzheimer's Disease Mother     Cancer Father     Diabetes Brother     Lung Disease Brother     Alcohol Abuse Brother              Review of Systems  Review of Systems   Constitutional:  Negative for appetite change, chills and fever.   Respiratory:  Positive for cough (with thick and clear sputum) and wheezing. Negative for chest tightness and shortness of breath.    Cardiovascular:  Negative for chest pain, palpitations and leg swelling.   Gastrointestinal:  Negative for nausea and vomiting.   All other systems reviewed and are negative.      BP (!) 150/75   Pulse 80   Temp 97.8 °F (36.6 °C) (Temporal)   Resp 16   Ht 1.505 m (4' 11.25\")   Wt 63.6 kg (140 lb 3.2 oz)   SpO2 97%   BMI 28.08 kg/m²       Physical Exam    Physical Exam  Vitals and nursing note reviewed.   Constitutional:       General: She is not in acute distress.     Appearance: She is not ill-appearing, toxic-appearing or diaphoretic.   HENT:      Mouth/Throat:      Mouth: Mucous membranes are moist.   Cardiovascular:      Rate and Rhythm: Regular rhythm.      Heart sounds: Murmur heard.   Pulmonary:      Effort: No respiratory distress.      Breath sounds: Normal breath sounds. No stridor. No rhonchi or rales. Wheezes: clears with cough, scattered to right upper and mid lobe.  Chest:      Chest wall: No tenderness.   Musculoskeletal:      Right lower leg: No edema.      Left lower leg: No edema.   Neurological:      Mental Status: She is oriented to person, place, and time.           Assessment & Plan  1. Bronchitis.  Her condition appears to be improving, with no indications necessitating a repeat chest

## 2025-05-23 ENCOUNTER — RESULTS FOLLOW-UP (OUTPATIENT)
Dept: INTERNAL MEDICINE CLINIC | Facility: CLINIC | Age: 89
End: 2025-05-23

## 2025-05-23 DIAGNOSIS — E83.52 HYPERCALCEMIA: Primary | ICD-10-CM

## 2025-07-03 DIAGNOSIS — E83.52 HYPERCALCEMIA: ICD-10-CM

## 2025-07-03 LAB — CA-I BLD-MCNC: 5.21 MG/DL (ref 4–5.2)

## 2025-07-04 ENCOUNTER — RESULTS FOLLOW-UP (OUTPATIENT)
Dept: INTERNAL MEDICINE CLINIC | Facility: CLINIC | Age: 89
End: 2025-07-04

## 2025-07-16 ENCOUNTER — OFFICE VISIT (OUTPATIENT)
Dept: INTERNAL MEDICINE CLINIC | Facility: CLINIC | Age: 89
End: 2025-07-16
Payer: MEDICARE

## 2025-07-16 VITALS
RESPIRATION RATE: 16 BRPM | OXYGEN SATURATION: 96 % | SYSTOLIC BLOOD PRESSURE: 182 MMHG | BODY MASS INDEX: 28.47 KG/M2 | WEIGHT: 141.2 LBS | HEART RATE: 68 BPM | TEMPERATURE: 97.9 F | HEIGHT: 59 IN | DIASTOLIC BLOOD PRESSURE: 73 MMHG

## 2025-07-16 DIAGNOSIS — E83.52 HYPERCALCEMIA: Primary | ICD-10-CM

## 2025-07-16 DIAGNOSIS — I10 ESSENTIAL HYPERTENSION, BENIGN: ICD-10-CM

## 2025-07-16 DIAGNOSIS — E83.52 HYPERCALCEMIA: ICD-10-CM

## 2025-07-16 DIAGNOSIS — E11.69 TYPE 2 DIABETES MELLITUS WITH OTHER SPECIFIED COMPLICATION, WITHOUT LONG-TERM CURRENT USE OF INSULIN (HCC): ICD-10-CM

## 2025-07-16 LAB
ALBUMIN SERPL-MCNC: 3.4 G/DL (ref 3.2–4.6)
ALBUMIN/GLOB SERPL: 1.1 (ref 1–1.9)
ALP SERPL-CCNC: 84 U/L (ref 35–104)
ALT SERPL-CCNC: 19 U/L (ref 8–45)
ANION GAP SERPL CALC-SCNC: 12 MMOL/L (ref 7–16)
AST SERPL-CCNC: 25 U/L (ref 15–37)
BILIRUB SERPL-MCNC: 0.4 MG/DL (ref 0–1.2)
BUN SERPL-MCNC: 15 MG/DL (ref 8–23)
CA-I BLD-MCNC: 5.35 MG/DL (ref 4–5.2)
CALCIUM SERPL-MCNC: 10.4 MG/DL (ref 8.8–10.2)
CHLORIDE SERPL-SCNC: 103 MMOL/L (ref 98–107)
CO2 SERPL-SCNC: 27 MMOL/L (ref 20–29)
CREAT SERPL-MCNC: 0.58 MG/DL (ref 0.6–1.1)
EST. AVERAGE GLUCOSE BLD GHB EST-MCNC: 141 MG/DL
GLOBULIN SER CALC-MCNC: 3.2 G/DL (ref 2.3–3.5)
GLUCOSE SERPL-MCNC: 115 MG/DL (ref 70–99)
HBA1C MFR BLD: 6.5 % (ref 0–5.6)
POTASSIUM SERPL-SCNC: 3.9 MMOL/L (ref 3.5–5.1)
PROT SERPL-MCNC: 6.6 G/DL (ref 6.3–8.2)
SODIUM SERPL-SCNC: 141 MMOL/L (ref 136–145)

## 2025-07-16 PROCEDURE — 1159F MED LIST DOCD IN RCRD: CPT | Performed by: NURSE PRACTITIONER

## 2025-07-16 PROCEDURE — G2211 COMPLEX E/M VISIT ADD ON: HCPCS | Performed by: NURSE PRACTITIONER

## 2025-07-16 PROCEDURE — 1090F PRES/ABSN URINE INCON ASSESS: CPT | Performed by: NURSE PRACTITIONER

## 2025-07-16 PROCEDURE — 3044F HG A1C LEVEL LT 7.0%: CPT | Performed by: NURSE PRACTITIONER

## 2025-07-16 PROCEDURE — 1123F ACP DISCUSS/DSCN MKR DOCD: CPT | Performed by: NURSE PRACTITIONER

## 2025-07-16 PROCEDURE — 1160F RVW MEDS BY RX/DR IN RCRD: CPT | Performed by: NURSE PRACTITIONER

## 2025-07-16 PROCEDURE — G8427 DOCREV CUR MEDS BY ELIG CLIN: HCPCS | Performed by: NURSE PRACTITIONER

## 2025-07-16 PROCEDURE — 1036F TOBACCO NON-USER: CPT | Performed by: NURSE PRACTITIONER

## 2025-07-16 PROCEDURE — 99215 OFFICE O/P EST HI 40 MIN: CPT | Performed by: NURSE PRACTITIONER

## 2025-07-16 PROCEDURE — G8417 CALC BMI ABV UP PARAM F/U: HCPCS | Performed by: NURSE PRACTITIONER

## 2025-07-16 RX ORDER — LOSARTAN POTASSIUM 100 MG/1
100 TABLET ORAL DAILY
Qty: 90 TABLET | Refills: 1 | Status: SHIPPED | OUTPATIENT
Start: 2025-07-16

## 2025-07-16 RX ORDER — LOSARTAN POTASSIUM 50 MG/1
100 TABLET ORAL DAILY
Qty: 90 TABLET | Refills: 3 | Status: CANCELLED
Start: 2025-07-16

## 2025-07-16 ASSESSMENT — ENCOUNTER SYMPTOMS
VOMITING: 0
DIARRHEA: 0
NAUSEA: 0
ABDOMINAL PAIN: 0
SHORTNESS OF BREATH: 0
CONSTIPATION: 0

## 2025-07-16 NOTE — PROGRESS NOTES
7/16/2025 1:44 PM  Location:[facility] PHYSICIAN SERVICES  [facility] MEDIC[facility]  [place]  Patient #:  [id number]  YOB: 1935    Reason for Visit  1 Week Follow Up Patient presents in the office today for a 1 week follow up on blood pressure and elevated calcium          History of Present Illness  The patient is a 89-year-old female who presents for a blood pressure recheck after stopping hydrochlorothiazide due to hypercalcemia. Recent lab work showed her calcium levels have been gradually increasing, reaching 10.9 most recently. She was advised to stop any calcium supplementation and discontinue hydrochlorothiazide. She is accompanied by her daughter-in-law.    She has been monitoring her blood pressure at home with the help of her daughter. She took her amlodipine and losartan this morning but skipped her cholesterol medication, which she usually takes at night.  She reports feeling fatigued but has no new symptoms. Her appetite and hydration are good, and she reports normal urination and bowel movements. She has noticed slight swelling in her ankles, which subsides overnight. She has a good blood pressure cuff at home.    She believes that atorvastatin is causing heartburn, which she manages with Pepto-Bismol.  She typically eats dinner between 6:00 and 6:30 PM and goes to bed around midnight.    She is curious about her A1c levels, which were last checked in 01/2025 and recorded as 6.7. She does not monitor her A1c levels regularly but notes them every six months.    She is currently on Prolia, administered every six months, with the last dose given in 01/2025. She is due for her next dose soon.    Sleep: She typically goes to bed around midnight.    Allergies   Allergen Reactions    Alendronate Itching    Aspirin Other (See Comments)     Pt reports she can not take due to internal bleeding     Naproxen Sodium Nausea Only     Past Medical History:   Diagnosis Date    Acute bronchitis

## 2025-07-25 RX ORDER — AMLODIPINE BESYLATE 5 MG/1
10 TABLET ORAL DAILY
Qty: 90 TABLET | Refills: 3
Start: 2025-07-25

## 2025-08-04 ENCOUNTER — TELEPHONE (OUTPATIENT)
Dept: INTERNAL MEDICINE CLINIC | Facility: CLINIC | Age: 89
End: 2025-08-04

## 2025-08-13 ENCOUNTER — OFFICE VISIT (OUTPATIENT)
Dept: INTERNAL MEDICINE CLINIC | Facility: CLINIC | Age: 89
End: 2025-08-13
Payer: MEDICARE

## 2025-08-13 VITALS
SYSTOLIC BLOOD PRESSURE: 183 MMHG | TEMPERATURE: 97.6 F | HEART RATE: 62 BPM | DIASTOLIC BLOOD PRESSURE: 77 MMHG | OXYGEN SATURATION: 94 % | BODY MASS INDEX: 28.35 KG/M2 | WEIGHT: 140.6 LBS | RESPIRATION RATE: 16 BRPM | HEIGHT: 59 IN

## 2025-08-13 DIAGNOSIS — I10 ESSENTIAL HYPERTENSION, BENIGN: ICD-10-CM

## 2025-08-13 DIAGNOSIS — E83.52 HYPERCALCEMIA: Primary | ICD-10-CM

## 2025-08-13 DIAGNOSIS — E83.52 HYPERCALCEMIA: ICD-10-CM

## 2025-08-13 LAB
ANION GAP SERPL CALC-SCNC: 9 MMOL/L (ref 7–16)
BUN SERPL-MCNC: 12 MG/DL (ref 8–23)
CALCIUM SERPL-MCNC: 10.5 MG/DL (ref 8.8–10.2)
CHLORIDE SERPL-SCNC: 104 MMOL/L (ref 98–107)
CO2 SERPL-SCNC: 29 MMOL/L (ref 20–29)
CREAT SERPL-MCNC: 0.55 MG/DL (ref 0.6–1.1)
GLUCOSE SERPL-MCNC: 124 MG/DL (ref 70–99)
POTASSIUM SERPL-SCNC: 4.1 MMOL/L (ref 3.5–5.1)
SODIUM SERPL-SCNC: 142 MMOL/L (ref 136–145)

## 2025-08-13 PROCEDURE — 1090F PRES/ABSN URINE INCON ASSESS: CPT | Performed by: NURSE PRACTITIONER

## 2025-08-13 PROCEDURE — 1123F ACP DISCUSS/DSCN MKR DOCD: CPT | Performed by: NURSE PRACTITIONER

## 2025-08-13 PROCEDURE — 99214 OFFICE O/P EST MOD 30 MIN: CPT | Performed by: NURSE PRACTITIONER

## 2025-08-13 PROCEDURE — 1159F MED LIST DOCD IN RCRD: CPT | Performed by: NURSE PRACTITIONER

## 2025-08-13 PROCEDURE — 1036F TOBACCO NON-USER: CPT | Performed by: NURSE PRACTITIONER

## 2025-08-13 PROCEDURE — G8427 DOCREV CUR MEDS BY ELIG CLIN: HCPCS | Performed by: NURSE PRACTITIONER

## 2025-08-13 PROCEDURE — G8417 CALC BMI ABV UP PARAM F/U: HCPCS | Performed by: NURSE PRACTITIONER

## 2025-08-13 PROCEDURE — G2211 COMPLEX E/M VISIT ADD ON: HCPCS | Performed by: NURSE PRACTITIONER

## 2025-08-13 PROCEDURE — 1160F RVW MEDS BY RX/DR IN RCRD: CPT | Performed by: NURSE PRACTITIONER

## 2025-08-13 RX ORDER — HYDROCHLOROTHIAZIDE 25 MG/1
12.5 TABLET ORAL EVERY MORNING
Qty: 45 TABLET | Refills: 1 | Status: SHIPPED | OUTPATIENT
Start: 2025-08-13 | End: 2025-08-13

## 2025-08-13 RX ORDER — HYDROCHLOROTHIAZIDE 25 MG/1
12.5 TABLET ORAL EVERY MORNING
Qty: 45 TABLET | Refills: 1 | Status: SHIPPED | OUTPATIENT
Start: 2025-08-13

## 2025-08-13 ASSESSMENT — ENCOUNTER SYMPTOMS: SHORTNESS OF BREATH: 0

## 2025-09-02 ENCOUNTER — OFFICE VISIT (OUTPATIENT)
Dept: INTERNAL MEDICINE CLINIC | Facility: CLINIC | Age: 89
End: 2025-09-02
Payer: MEDICARE

## 2025-09-02 VITALS
SYSTOLIC BLOOD PRESSURE: 160 MMHG | DIASTOLIC BLOOD PRESSURE: 78 MMHG | OXYGEN SATURATION: 99 % | HEART RATE: 65 BPM | BODY MASS INDEX: 28.43 KG/M2 | RESPIRATION RATE: 16 BRPM | HEIGHT: 59 IN | WEIGHT: 141 LBS

## 2025-09-02 DIAGNOSIS — C50.812 MALIGNANT NEOPLASM OF OVERLAPPING SITES OF LEFT BREAST IN FEMALE, ESTROGEN RECEPTOR NEGATIVE (HCC): ICD-10-CM

## 2025-09-02 DIAGNOSIS — E78.2 MIXED HYPERLIPIDEMIA: ICD-10-CM

## 2025-09-02 DIAGNOSIS — E11.69 TYPE 2 DIABETES MELLITUS WITH OTHER SPECIFIED COMPLICATION, WITHOUT LONG-TERM CURRENT USE OF INSULIN (HCC): ICD-10-CM

## 2025-09-02 DIAGNOSIS — M81.6 LOCALIZED OSTEOPOROSIS, UNSPECIFIED PATHOLOGICAL FRACTURE PRESENCE: ICD-10-CM

## 2025-09-02 DIAGNOSIS — F33.1 MAJOR DEPRESSIVE DISORDER, RECURRENT, MODERATE (HCC): ICD-10-CM

## 2025-09-02 DIAGNOSIS — Z17.1 MALIGNANT NEOPLASM OF OVERLAPPING SITES OF LEFT BREAST IN FEMALE, ESTROGEN RECEPTOR NEGATIVE (HCC): ICD-10-CM

## 2025-09-02 DIAGNOSIS — F33.1 MODERATE EPISODE OF RECURRENT MAJOR DEPRESSIVE DISORDER (HCC): ICD-10-CM

## 2025-09-02 DIAGNOSIS — I10 ESSENTIAL HYPERTENSION, BENIGN: Primary | ICD-10-CM

## 2025-09-02 PROBLEM — R73.9 HYPERGLYCEMIA: Status: RESOLVED | Noted: 2017-04-04 | Resolved: 2025-09-02

## 2025-09-02 PROCEDURE — G2211 COMPLEX E/M VISIT ADD ON: HCPCS | Performed by: INTERNAL MEDICINE

## 2025-09-02 PROCEDURE — G8417 CALC BMI ABV UP PARAM F/U: HCPCS | Performed by: INTERNAL MEDICINE

## 2025-09-02 PROCEDURE — G8427 DOCREV CUR MEDS BY ELIG CLIN: HCPCS | Performed by: INTERNAL MEDICINE

## 2025-09-02 PROCEDURE — 1036F TOBACCO NON-USER: CPT | Performed by: INTERNAL MEDICINE

## 2025-09-02 PROCEDURE — 99214 OFFICE O/P EST MOD 30 MIN: CPT | Performed by: INTERNAL MEDICINE

## 2025-09-02 PROCEDURE — 1123F ACP DISCUSS/DSCN MKR DOCD: CPT | Performed by: INTERNAL MEDICINE

## 2025-09-02 PROCEDURE — 1160F RVW MEDS BY RX/DR IN RCRD: CPT | Performed by: INTERNAL MEDICINE

## 2025-09-02 PROCEDURE — 1090F PRES/ABSN URINE INCON ASSESS: CPT | Performed by: INTERNAL MEDICINE

## 2025-09-02 PROCEDURE — 3044F HG A1C LEVEL LT 7.0%: CPT | Performed by: INTERNAL MEDICINE

## 2025-09-02 PROCEDURE — 1159F MED LIST DOCD IN RCRD: CPT | Performed by: INTERNAL MEDICINE

## (undated) DEVICE — REM POLYHESIVE ADULT PATIENT RETURN ELECTRODE: Brand: VALLEYLAB

## (undated) DEVICE — BONE TAMP KIT KPX153PB FF X2 15/3 1 STP: Brand: KYPHOPAK™ TRAY

## (undated) DEVICE — (D)PREP SKN CHLRAPRP APPL 26ML -- CONVERT TO ITEM 371833

## (undated) DEVICE — DRAPE XR C ARM 41X74IN LF --

## (undated) DEVICE — MEDI-VAC YANKAUER SUCTION HANDLE W/BULBOUS TIP: Brand: CARDINAL HEALTH

## (undated) DEVICE — GOWN,REINF,POLY,ECL,PP SLV,XL: Brand: MEDLINE

## (undated) DEVICE — CARDINAL HEALTH FLEXIBLE LIGHT HANDLE COVER: Brand: CARDINAL HEALTH

## (undated) DEVICE — SHEET, T, LAPAROTOMY, STERILE: Brand: MEDLINE

## (undated) DEVICE — BONE BIOPSY DEVICE F05A BBD SIZE 3: Brand: MEDTRONIC REUSABLE INSTRUMENTS

## (undated) DEVICE — 3M™ IOBAN™ 2 ANTIMICROBIAL INCISE DRAPE 6650EZ: Brand: IOBAN™ 2

## (undated) DEVICE — TRAY PREP DRY W/ PREM GLV 2 APPL 6 SPNG 2 UNDPD 1 OVERWRAP

## (undated) DEVICE — BUTTON SWITCH PENCIL BLADE ELECTRODE, HOLSTER: Brand: EDGE

## (undated) DEVICE — DRAPE SHT 3 QTR PROXIMA 53X77 --

## (undated) DEVICE — SUTURE ETHLN SZ 2-0 L18IN NONABSORBABLE BLK L26MM PS 3/8 585H

## (undated) DEVICE — COVER,TABLE,44X76,STERILE: Brand: MEDLINE

## (undated) DEVICE — BLOCK BITE AD 60FR W/ VELC STRP ADDRESSES MOST PT AND

## (undated) DEVICE — SKIN MARKER,REGULAR TIP WITH RULER AND LABELS: Brand: DEVON

## (undated) DEVICE — MEDI-VAC NON-CONDUCTIVE SUCTION TUBING: Brand: CARDINAL HEALTH

## (undated) DEVICE — SOLUTION IV 1000ML 0.9% SOD CHL

## (undated) DEVICE — X-RAY SPONGES,12 PLY: Brand: DERMACEA

## (undated) DEVICE — SMALL BOWL SET-LF: Brand: MEDLINE INDUSTRIES, INC.

## (undated) DEVICE — SPONGE LAP 18X18IN STRL -- 5/PK

## (undated) DEVICE — 1840 FOAM BLOCK NEEDLE COUNTER: Brand: DEVON

## (undated) DEVICE — SYR BULB 60ML IRRIGATION -- CONVERT TO ITEM 116413

## (undated) DEVICE — KENDALL SCD EXPRESS SLEEVES, KNEE LENGTH, MEDIUM: Brand: KENDALL SCD

## (undated) DEVICE — SUTURE VCRL SZ 3-0 L27IN ABSRB UD L26MM SH 1/2 CIR J416H

## (undated) DEVICE — NEEDLE HYPO 21GA L1.5IN INTRAMUSCULAR S STL LATCH BVL UP

## (undated) DEVICE — SURGICAL PROCEDURE PACK BASIC ST FRANCIS

## (undated) DEVICE — COVER,MAYO STAND,STERILE: Brand: MEDLINE

## (undated) DEVICE — SUTURE VCRL SZ 3-0 L18IN ABSRB UD W/O NDL POLYGLACTIN 910 J110T

## (undated) DEVICE — SPONGE: SPECIALTY PEANUT XR 100/CS: Brand: MEDICAL ACTION INDUSTRIES

## (undated) DEVICE — SLIM BODY SKIN STAPLER: Brand: APPOSE ULC

## (undated) DEVICE — DRAPE TWL SURG 16X26IN BLU ORB04] ALLCARE INC]

## (undated) DEVICE — AMD ANTIMICROBIAL NON-ADHERENT PAD,0.2% POLYHEXAMETHYLENE BIGUANIDE HCI (PHMB): Brand: TELFA

## (undated) DEVICE — 3M™ TEGADERM™ TRANSPARENT FILM DRESSING FRAME STYLE, 1624W, 2-3/8 IN X 2-3/4 IN (6 CM X 7 CM), 100/CT 4CT/CASE: Brand: 3M™ TEGADERM™

## (undated) DEVICE — CANNULA NSL ORAL AD FOR CAPNOFLEX CO2 O2 AIRLFE

## (undated) DEVICE — SUTURE PDS II SZ 0 L18IN ABSRB VLT L36MM CT-1 1/2 CIR Z740D

## (undated) DEVICE — GARMENT,MEDLINE,DVT,INT,CALF,MED, GEN2: Brand: MEDLINE

## (undated) DEVICE — 3M™ TEGADERM™ TRANSPARENT FILM DRESSING FRAME STYLE, 1628, 6 IN X 8 IN (15 CM X 20 CM), 10/CT 8CT/CASE: Brand: 3M™ TEGADERM™

## (undated) DEVICE — SYR 50ML SLIP TIP NSAF LF STRL --

## (undated) DEVICE — STANDARD HYPODERMIC NEEDLE,POLYPROPYLENE HUB: Brand: MONOJECT

## (undated) DEVICE — SYR LR LCK 1ML GRAD NSAF 30ML --

## (undated) DEVICE — CONNECTOR TBNG OD5-7MM O2 END DISP

## (undated) DEVICE — KENDALL RADIOLUCENT FOAM MONITORING ELECTRODE RECTANGULAR SHAPE: Brand: KENDALL

## (undated) DEVICE — SYR 10ML LUER LOK 1/5ML GRAD --

## (undated) DEVICE — 1010 S-DRAPE TOWEL DRAPE 10/BX: Brand: STERI-DRAPE™

## (undated) DEVICE — INTENDED FOR TISSUE SEPARATION, AND OTHER PROCEDURES THAT REQUIRE A SHARP SURGICAL BLADE TO PUNCTURE OR CUT.: Brand: BARD-PARKER SAFETY BLADES SIZE 15, STERILE